# Patient Record
Sex: FEMALE | Race: WHITE | NOT HISPANIC OR LATINO | Employment: OTHER | URBAN - METROPOLITAN AREA
[De-identification: names, ages, dates, MRNs, and addresses within clinical notes are randomized per-mention and may not be internally consistent; named-entity substitution may affect disease eponyms.]

---

## 2017-04-19 ENCOUNTER — TELEPHONE (OUTPATIENT)
Dept: FAMILY MEDICINE | Facility: CLINIC | Age: 72
End: 2017-04-19

## 2017-04-19 NOTE — TELEPHONE ENCOUNTER
I called and according to MD does not need MMR if born before 1956.   I called patient and let her know this.      Vaccinating adults with MMR vaccine  For all [place] health care providers:   Unless there is a medical contraindication, give at least one dose of MMR to adults who were born in 1956 or later who do not have documentation of one or more doses of MMR. (Adults born before 1956 are generally considered immune to measles.)   Give a second dose of MMR at least 28 days later to:   Adults in an outbreak setting (In this outbreak, specific adults have already been contacted by the health department and directed to go to their provider.)   Students in post-secondary educational institutions   Persons who work in a health care facility   Persons who plan to travel internationally   Anyone who would like the additional protection of a second dose of MMR

## 2017-04-19 NOTE — TELEPHONE ENCOUNTER
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  What are your recommendations?  Should she be re-immunized?  Please call patient.  OK to leave message on voicemail.

## 2017-05-03 DIAGNOSIS — I10 ESSENTIAL HYPERTENSION WITH GOAL BLOOD PRESSURE LESS THAN 140/90: ICD-10-CM

## 2017-05-04 RX ORDER — ATENOLOL 25 MG/1
TABLET ORAL
Qty: 90 TABLET | Refills: 0 | Status: SHIPPED | OUTPATIENT
Start: 2017-05-04 | End: 2017-07-04

## 2017-05-04 NOTE — TELEPHONE ENCOUNTER
Last office visit : 6/6/16.    BP Readings from Last 3 Encounters:   06/06/16 110/78   03/01/16 141/89   02/23/16 104/62        Prescription approved per [facility] Refill Protocol.  Manju Mcfadden RN

## 2017-06-25 DIAGNOSIS — Z13.6 CARDIOVASCULAR SCREENING; LDL GOAL LESS THAN 160: ICD-10-CM

## 2017-06-26 RX ORDER — LOVASTATIN 20 MG
TABLET ORAL
Qty: 30 TABLET | Refills: 0 | Status: SHIPPED | OUTPATIENT
Start: 2017-06-26 | End: 2017-07-04

## 2017-06-26 NOTE — TELEPHONE ENCOUNTER
Lovastatin     Last Written Prescription Date: 9/9/16  Last Fill Quantity: 90, # refills: 2  Last Office Visit with G, P or [facility] prescribing provider: 6/6/16       Lab Results   Component Value Date    CHOL 192 06/06/2016     Lab Results   Component Value Date    HDL 68 06/06/2016     Lab Results   Component Value Date     06/06/2016     Lab Results   Component Value Date    TRIG 57 06/06/2016     Lab Results   Component Value Date    CHOLHDLRATIO 2.6 09/03/2015

## 2017-07-05 ENCOUNTER — OFFICE VISIT (OUTPATIENT)
Dept: FAMILY MEDICINE | Facility: CLINIC | Age: 72
End: 2017-07-05
Payer: COMMERCIAL

## 2017-07-05 VITALS
SYSTOLIC BLOOD PRESSURE: 112 MMHG | BODY MASS INDEX: 26.16 KG/M2 | TEMPERATURE: 96.5 F | HEIGHT: 65 IN | DIASTOLIC BLOOD PRESSURE: 70 MMHG | WEIGHT: 157 LBS | HEART RATE: 71 BPM | OXYGEN SATURATION: 98 %

## 2017-07-05 DIAGNOSIS — A60.1 HERPES SIMPLEX INFECTION OF PERIANAL SKIN: ICD-10-CM

## 2017-07-05 DIAGNOSIS — E78.5 HYPERLIPIDEMIA, UNSPECIFIED HYPERLIPIDEMIA TYPE: ICD-10-CM

## 2017-07-05 DIAGNOSIS — Z23 NEED FOR 23-POLYVALENT PNEUMOCOCCAL POLYSACCHARIDE VACCINE: ICD-10-CM

## 2017-07-05 DIAGNOSIS — Z85.828 HISTORY OF BASAL CELL CARCINOMA: ICD-10-CM

## 2017-07-05 DIAGNOSIS — L29.3 PERINEAL ITCHING, FEMALE: ICD-10-CM

## 2017-07-05 DIAGNOSIS — L98.9 SKIN LESION: ICD-10-CM

## 2017-07-05 DIAGNOSIS — Z12.31 ENCOUNTER FOR SCREENING MAMMOGRAM FOR BREAST CANCER: ICD-10-CM

## 2017-07-05 DIAGNOSIS — Z13.0 SCREENING FOR DEFICIENCY ANEMIA: ICD-10-CM

## 2017-07-05 DIAGNOSIS — Z00.00 ROUTINE GENERAL MEDICAL EXAMINATION AT A HEALTH CARE FACILITY: Primary | ICD-10-CM

## 2017-07-05 DIAGNOSIS — I10 HYPERTENSION GOAL BP (BLOOD PRESSURE) < 140/90: ICD-10-CM

## 2017-07-05 DIAGNOSIS — L84 FOOT CALLUS: ICD-10-CM

## 2017-07-05 LAB
ALBUMIN SERPL-MCNC: 3.7 G/DL (ref 3.4–5)
ALP SERPL-CCNC: 51 U/L (ref 40–150)
ALT SERPL W P-5'-P-CCNC: 24 U/L (ref 0–50)
ANION GAP SERPL CALCULATED.3IONS-SCNC: 10 MMOL/L (ref 3–14)
AST SERPL W P-5'-P-CCNC: 21 U/L (ref 0–45)
BASOPHILS # BLD AUTO: 0 10E9/L (ref 0–0.2)
BASOPHILS NFR BLD AUTO: 0.8 %
BILIRUB SERPL-MCNC: 0.3 MG/DL (ref 0.2–1.3)
BUN SERPL-MCNC: 20 MG/DL (ref 7–30)
CALCIUM SERPL-MCNC: 9.2 MG/DL (ref 8.5–10.1)
CHLORIDE SERPL-SCNC: 111 MMOL/L (ref 94–109)
CHOLEST SERPL-MCNC: 188 MG/DL
CO2 SERPL-SCNC: 23 MMOL/L (ref 20–32)
CREAT SERPL-MCNC: 0.8 MG/DL (ref 0.52–1.04)
CREAT UR-MCNC: 366 MG/DL
DIFFERENTIAL METHOD BLD: ABNORMAL
EOSINOPHIL # BLD AUTO: 0.1 10E9/L (ref 0–0.7)
EOSINOPHIL NFR BLD AUTO: 2.8 %
ERYTHROCYTE [DISTWIDTH] IN BLOOD BY AUTOMATED COUNT: 13.4 % (ref 10–15)
GFR SERPL CREATININE-BSD FRML MDRD: 70 ML/MIN/1.7M2
GLUCOSE SERPL-MCNC: 97 MG/DL (ref 70–99)
HCT VFR BLD AUTO: 41.1 % (ref 35–47)
HDLC SERPL-MCNC: 66 MG/DL
HGB BLD-MCNC: 13.7 G/DL (ref 11.7–15.7)
LDLC SERPL CALC-MCNC: 109 MG/DL
LYMPHOCYTES # BLD AUTO: 1 10E9/L (ref 0.8–5.3)
LYMPHOCYTES NFR BLD AUTO: 26.1 %
MCH RBC QN AUTO: 30.4 PG (ref 26.5–33)
MCHC RBC AUTO-ENTMCNC: 33.3 G/DL (ref 31.5–36.5)
MCV RBC AUTO: 91 FL (ref 78–100)
MICROALBUMIN UR-MCNC: 65 MG/L
MICROALBUMIN/CREAT UR: 17.79 MG/G CR (ref 0–25)
MONOCYTES # BLD AUTO: 0.5 10E9/L (ref 0–1.3)
MONOCYTES NFR BLD AUTO: 11.9 %
NEUTROPHILS # BLD AUTO: 2.3 10E9/L (ref 1.6–8.3)
NEUTROPHILS NFR BLD AUTO: 58.4 %
NONHDLC SERPL-MCNC: 122 MG/DL
PLATELET # BLD AUTO: 236 10E9/L (ref 150–450)
POTASSIUM SERPL-SCNC: 4 MMOL/L (ref 3.4–5.3)
PROT SERPL-MCNC: 7 G/DL (ref 6.8–8.8)
RBC # BLD AUTO: 4.5 10E12/L (ref 3.8–5.2)
SODIUM SERPL-SCNC: 144 MMOL/L (ref 133–144)
TRIGL SERPL-MCNC: 63 MG/DL
TSH SERPL DL<=0.005 MIU/L-ACNC: 2.4 MU/L (ref 0.4–4)
WBC # BLD AUTO: 3.9 10E9/L (ref 4–11)

## 2017-07-05 PROCEDURE — 90732 PPSV23 VACC 2 YRS+ SUBQ/IM: CPT | Performed by: FAMILY MEDICINE

## 2017-07-05 PROCEDURE — 80050 GENERAL HEALTH PANEL: CPT | Performed by: FAMILY MEDICINE

## 2017-07-05 PROCEDURE — 80061 LIPID PANEL: CPT | Performed by: FAMILY MEDICINE

## 2017-07-05 PROCEDURE — G0009 ADMIN PNEUMOCOCCAL VACCINE: HCPCS | Performed by: FAMILY MEDICINE

## 2017-07-05 PROCEDURE — 36415 COLL VENOUS BLD VENIPUNCTURE: CPT | Performed by: FAMILY MEDICINE

## 2017-07-05 PROCEDURE — 99213 OFFICE O/P EST LOW 20 MIN: CPT | Mod: 25 | Performed by: FAMILY MEDICINE

## 2017-07-05 PROCEDURE — 82043 UR ALBUMIN QUANTITATIVE: CPT | Performed by: FAMILY MEDICINE

## 2017-07-05 PROCEDURE — 99397 PER PM REEVAL EST PAT 65+ YR: CPT | Mod: 25 | Performed by: FAMILY MEDICINE

## 2017-07-05 RX ORDER — ATENOLOL 25 MG/1
TABLET ORAL
Qty: 90 TABLET | Refills: 1 | Status: SHIPPED | OUTPATIENT
Start: 2017-07-05 | End: 2018-08-03

## 2017-07-05 RX ORDER — LOVASTATIN 20 MG
20 TABLET ORAL AT BEDTIME
Qty: 90 TABLET | Refills: 3 | Status: SHIPPED | OUTPATIENT
Start: 2017-07-05 | End: 2018-08-03

## 2017-07-05 RX ORDER — ACYCLOVIR 400 MG/1
400 TABLET ORAL 3 TIMES DAILY
Qty: 30 TABLET | Refills: 0 | Status: SHIPPED | OUTPATIENT
Start: 2017-07-05 | End: 2018-06-07

## 2017-07-05 NOTE — PROGRESS NOTES
SUBJECTIVE:   Lidia Jenkins is a 71 year old female who presents for Preventive Visit.      Are you in the first 12 months of your Medicare coverage?  No    Physical   Annual:     Getting at least 3 servings of Calcium per day::  Yes    Bi-annual eye exam::  Yes    Dental care twice a year::  Yes    Sleep apnea or symptoms of sleep apnea::  None    Diet::  Regular (no restrictions)    Frequency of exercise::  4-5 days/week    Duration of exercise::  30-45 minutes    Taking medications regularly::  Yes    Medication side effects::  None    Additional concerns today::  YES    Answers for HPI/ROS submitted by the patient on 7/4/2017   PHQ-2 Score: 0    COGNITIVE SCREEN  1) Repeat 3 items (Banana, Sunrise, Chair)    2) Clock draw: NORMAL  3) 3 item recall: Recalls 3 objects  Results: NORMAL clock, 1-2 items recalled: COGNITIVE IMPAIRMENT LESS LIKELY    Mini-CogTM Copyright S Tj. Licensed by the author for use in [place] [employer]; reprinted with permission (maximiliano@[facility]). All rights reserved.        2yrs had an itchy area around anus , wiping felt good, may have broke skin, seen 6 months later and told to take clotrimaxole, then spread beyond anus, then vaginal area , Went doctor again, didn't know what it was, used desitin, didn't help   Sometimes at night uses bidet and helps     In addition to that now feels has herpes genital. No itching internally , only on inside. Wit pain and bumps right gluteal area near anus started this past week     Red area 2 yrs right knee     Dizzy when weeding in garden and stands up   If sitting n chair and reading and gets up feels blood pounding back of neck. Goes away quickly , never before occurs now after when just resting. Not all the time. Not in several weeks. No associated symptoms syncope, chest pain, blacking out. No palpitations, vision changes, URI symptoms, tingling numbness or falls     Wonders if satin will cause her right leg to suddenly catch and feel might  give way in right groin area. No falls, no bruising, has had xray's and MRI before and seen chiropractor and gets massage but nothing found    Wonders if vit d ok, was 55 in 2015  Would like lesion checked left forearm, wants to know if red bump noted past 4 days is a mosquito bite versus lyme's    HTN stable on atenolol    HLD on statin     Reviewed and updated as needed this visit by clinical staff  Tobacco  Allergies  Meds  Med Hx  Surg Hx  Fam Hx  Soc Hx        Reviewed and updated as needed this visit by Provider        Social History   Substance Use Topics     Smoking status: Never Smoker     Smokeless tobacco: Not on file     Alcohol use 2.4 oz/week       The patient does not drink >3 drinks per day nor >7 drinks per week.        PROBLEMS TO ADD ON... Pt would like a prescription for acyclovir     Today's PHQ-2 Score:   PHQ-2 ( 1999 Pfizer) 7/4/2017   Q1: Little interest or pleasure in doing things Not at all   Q2: Feeling down, depressed or hopeless Not at all   PHQ-2 Score 0       Do you feel safe in your environment - Yes    Do you have a Health Care Directive?: Yes: Patient states has Advance Directive and will bring in a copy to clinic.    Current providers sharing in care for this patient include:   Patient Care Team:  Jessica Cheema PA-C as PCP - General (Physician Assistant)  Ana Pantoja MD as MD (Family Medicine - Sports Medicine)      Hearing impairment: No    Ability to successfully perform activities of daily living: Yes, no assistance needed     Fall risk:  Fallen 2 or more times in the past year?: No  Any fall with injury in the past year?: No    Home safety:  none identified      The following health maintenance items are reviewed in Epic and correct as of today:  Health Maintenance   Topic Date Due     PNEUMOCOCCAL (2 of 2 - PPSV23) 06/27/2017     FALL RISK ASSESSMENT  06/06/2017     INFLUENZA VACCINE (SYSTEM ASSIGNED)  09/01/2017     MAMMO SCREEN Q2 YR (SYSTEM  ASSIGNED)  10/21/2017     LIPID SCREEN Q5 YR FEMALE (SYSTEM ASSIGNED)  06/06/2021     ADVANCE DIRECTIVE PLANNING Q5 YRS  06/27/2021     COLON CANCER SCREEN (SYSTEM ASSIGNED)  06/15/2022     TETANUS IMMUNIZATION (SYSTEM ASSIGNED)  01/01/2023     DEXA SCAN SCREENING (SYSTEM ASSIGNED)  Completed     HEPATITIS C SCREENING  Completed     Labs reviewed in EPIC  BP Readings from Last 3 Encounters:   07/05/17 112/70   06/06/16 110/78   03/01/16 141/89    Wt Readings from Last 3 Encounters:   07/05/17 157 lb (71.2 kg)   06/06/16 167 lb (75.8 kg)   03/01/16 172 lb 8 oz (78.2 kg)                  Patient Active Problem List   Diagnosis     Hypertension goal BP (blood pressure) < 140/90     History of basal cell carcinoma     Advance care planning     Past Surgical History:   Procedure Laterality Date     ABDOMEN SURGERY  1987     COLONOSCOPY  2012       Social History   Substance Use Topics     Smoking status: Never Smoker     Smokeless tobacco: Not on file     Alcohol use 2.4 oz/week     Family History   Problem Relation Age of Onset     Breast Cancer Mother      Hypertension Mother      Hyperlipidemia Mother      Depression Mother      Hypertension Brother      Hyperlipidemia Brother      Hypertension Sister      Hyperlipidemia Sister      Coronary Artery Disease Father      MI     Hypertension Father      Hyperlipidemia Father      Substance Abuse Father      Coronary Artery Disease Brother      Hypertension Brother      Substance Abuse Brother      Hypertension Sister      Hyperlipidemia Sister      Depression Sister      Substance Abuse Sister      Other Cancer No family hx of          Current Outpatient Prescriptions   Medication Sig Dispense Refill     atenolol (TENORMIN) 25 MG tablet TAKE 1 TABLET(25 MG) BY MOUTH DAILY 90 tablet 1     lovastatin (MEVACOR) 20 MG tablet Take 1 tablet (20 mg) by mouth At Bedtime 90 tablet 3     aspirin 81 MG EC tablet Take 1 tablet (81 mg) by mouth daily 90 tablet 3     cholecalciferol  "(VITAMIN D) 1000 UNIT tablet Take 2 tablets (2,000 Units) by mouth daily 100 tablet 3     No Known Allergies  Recent Labs   Lab Test  06/06/16   0951  09/03/15   1001 01/02/15 12/29/14 05/12/14   LDL  113*  97   --   117  93   HDL  68  69   --   60  75   TRIG  57  75   --   118  56   ALT  27  31  28   --    --    CR  0.76  0.80  0.76   --   0.72   GFRESTIMATED  75  71   --    --   87   GFRESTBLACK  >90   GFR Calc    86   --    --   100   POTASSIUM  3.9  4.2  4.3   --   4.4   TSH   --    --    --   1.71  2.710        Pneumonia Vaccine:Adults age 65+ who received Pneumovax (PPSV23) at 65 years or older: Should be given PCV13 > 1 year after their most recent PPSV23    ROS:  Constitutional, HEENT, cardiovascular, pulmonary, GI, , musculoskeletal, neuro, skin, endocrine and psych systems are negative, except as otherwise noted.    OBJECTIVE:   /70 (BP Location: Left arm, Patient Position: Chair, Cuff Size: Adult Regular)  Pulse 71  Temp 96.5  F (35.8  C) (Tympanic)  Ht 5' 5\" (1.651 m)  Wt 157 lb (71.2 kg)  SpO2 98%  BMI 26.13 kg/m2 Estimated body mass index is 26.13 kg/(m^2) as calculated from the following:    Height as of this encounter: 5' 5\" (1.651 m).    Weight as of this encounter: 157 lb (71.2 kg).  EXAM:   GENERAL: healthy, alert and no distress  EYES: Eyes grossly normal to inspection, PERRL and conjunctivae and sclerae normal  HENT: ear canals and TM's normal, nose and mouth without ulcers or lesions  NECK: no adenopathy, no asymmetry, masses, or scars and thyroid normal to palpation  RESP: lungs clear to auscultation - no rales, rhonchi or wheezes  BREAST: normal without masses, tenderness or nipple discharge and no palpable axillary masses or adenopathy  CV: regular rate and rhythm, normal S1 S2, no S3 or S4, no murmur, click or rub, no peripheral edema and peripheral pulses strong  ABDOMEN: soft, non tender, no hepatosplenomegaly, no masses and bowel sounds normal   (female): " normal female external genitalia, normal urethral meatus , vaginal mucosal atrophy and right gluteal collection of sores consistent with genital herpes, rectum normal, some skin tags, skin tear perineal body area,   MS: no gross musculoskeletal defects noted, no edema, full range of motion  hip , gait normal   SKIN: 1/cm rough red non blanching spot top of right knee and 1 cm raised red blanching bump left forearm near elbow consistent with local reaction to bug bite, no suspicious lesions or rashes left 5th toe side 1/4 cm deep hard lump? Wart vs callus   NEURO: Normal strength and tone, mentation intact and speech normal  PSYCH: mentation appears normal, affect normal/bright  LYMPH: no cervical, supraclavicular, axillary, or inguinal adenopathy    ASSESSMENT / PLAN:   1. Routine general medical examination at a health care facility  hx of HTN on atenolol & asa, HLD on lovastatin, hx of BCC, psoriasis, hx of abdominal surgery, prior colonoscopy in 2012 , seen by PCP OTNJA Cheema 6/2016 for preventive physical & HCTZ changed to atenolol, seen by derm, health care directive done 2016 MNPMP negative. Here for physical. Pneuomvax 23 today. Mammogram due in the fall. Labs today to evaluate symptoms. Refilled med's. Statin unlikely to cause isolated right hip issue. Can send to PT or sports med if gets worse. Declined for now. Acyclovir three times a day for 10 days for right gluteal genital herpes. follow up with derm regarding right knee lesion. Redness on left forearm only a bug bit , benadryl cream OTC. No rash seen perineum , suspect dry skin / lack of estrogen, may discuss with derm too. Offered clobetasol or trial of premarin declined. Does bike, could be humidity. Keep dry, use wet wipes , bidet and may try hydrocortisone OTC. Vit d normal in 2015. continue care with Deni. Avoid bending and get up slowly, to prevent dizziness, suspect combo of age and atenolol. No red flag sign seen, if gets worse or more  frequent call us may need to see rehab/ imaging/ neuro at that time.   - atenolol (TENORMIN) 25 MG tablet; TAKE 1 TABLET(25 MG) BY MOUTH DAILY  Dispense: 90 tablet; Refill: 1  - lovastatin (MEVACOR) 20 MG tablet; Take 1 tablet (20 mg) by mouth At Bedtime  Dispense: 90 tablet; Refill: 3  - CBC with platelets differential  - Comprehensive metabolic panel  - Lipid panel reflex to direct LDL  - TSH with free T4 reflex  - Albumin Random Urine Quantitative  - MA Screening Digital Bilateral; Future  - VACCINE ADMINISTRATION, INITIAL  - Pneumococcal vaccine 23 valent PPSV23  (Pneumovax) [[id number]]    2. Need for 23-polyvalent pneumococcal polysaccharide vaccine  - VACCINE ADMINISTRATION, INITIAL  - Pneumococcal vaccine 23 valent PPSV23  (Pneumovax) [[id number]]    3. Encounter for screening mammogram for breast cancer  - MA Screening Digital Bilateral; Future    4. Hypertension goal BP (blood pressure) < 140/90  Stable on med  - atenolol (TENORMIN) 25 MG tablet; TAKE 1 TABLET(25 MG) BY MOUTH DAILY  Dispense: 90 tablet; Refill: 1  - Comprehensive metabolic panel  - TSH with free T4 reflex  - Albumin Random Urine Quantitative    5. Hyperlipidemia, unspecified hyperlipidemia type  - lovastatin (MEVACOR) 20 MG tablet; Take 1 tablet (20 mg) by mouth At Bedtime  Dispense: 90 tablet; Refill: 3  - Lipid panel reflex to direct LDL  - TSH with free T4 reflex    6. History of basal cell carcinoma  Follow up with derm     7. Screening for deficiency anemia  - CBC with platelets differential    8. Herpes simplex infection of perianal skin  Right gluteal near anus  - acyclovir (ZOVIRAX) 400 MG tablet; Take 1 tablet (400 mg) by mouth 3 times daily  Dispense: 30 tablet; Refill: 0    9. Perineal itching, female  No rash seen perineum , suspect dry skin / lack of estrogen, may discuss with derm too. Offered clobetasol or trial of premarin declined. Does bike, could be humidity. Keep dry, use wet wipes , bidet and may try hydrocortisone OTC.  -  "OFFICE/OUTPT VISIT,EST,LEVL III  - DERMATOLOGY REFERRAL    10. Skin lesion  Right knee rough spot , could be precancerous, see derm for excision  - OFFICE/OUTPT VISIT,EST,LEVL III  - DERMATOLOGY REFERRAL    11. Foot callus  Left 5th lateral toe, suspect callus versus wart, feels deep, no cryo refer to podiatry   - OFFICE/OUTPT VISIT,EST,LEVL III  - PODIATRY/FOOT & ANKLE SURGERY REFERRAL        End of Life Planning:  Patient currently has an advanced directive: Yes.  Practitioner is supportive of decision.    COUNSELING:  Reviewed preventive health counseling, as reflected in patient instructions       Regular exercise       Healthy diet/nutrition       Vision screening       Dental care       Immunizations    Vaccinated for: Pneumococcal        Estimated body mass index is 26.13 kg/(m^2) as calculated from the following:    Height as of this encounter: 5' 5\" (1.651 m).    Weight as of this encounter: 157 lb (71.2 kg).  Weight management plan: Discussed healthy diet and exercise guidelines and patient will follow up in 12 months in clinic to re-evaluate.   reports that she has never smoked. She does not have any smokeless tobacco history on file.      Appropriate preventive services were discussed with this patient, including applicable screening as appropriate for cardiovascular disease, diabetes, osteopenia/osteoporosis, and glaucoma.  As appropriate for age/gender, discussed screening for colorectal cancer, prostate cancer, breast cancer, and cervical cancer. Checklist reviewing preventive services available has been given to the patient.    Reviewed patients plan of care and provided an AVS. The Basic Care Plan (routine screening as documented in Health Maintenance) for Lidia meets the Care Plan requirement. This Care Plan has been established and reviewed with the Patient.    Counseling Resources:  ATP IV Guidelines  Pooled Cohorts Equation Calculator  Breast Cancer Risk Calculator  FRAX Risk Assessment  ICSI " Preventive Guidelines  Dietary Guidelines for Americans, 2010  USDA's My Plate  ASA Prophylaxis  Lung CA Screening    Miguelina Harp MD  [facility]

## 2017-07-05 NOTE — NURSING NOTE
Screening Questionnaire for Adult Immunization    Are you sick today?   No   Do you have allergies to medications, food, a vaccine component or latex?   No   Have you ever had a serious reaction after receiving a vaccination?   No   Do you have a long-term health problem with heart disease, lung disease, asthma, kidney disease, metabolic disease (e.g. diabetes), anemia, or other blood disorder?   No   Do you have cancer, leukemia, HIV/AIDS, or any other immune system problem?   No   In the past 3 months, have you taken medications that affect  your immune system, such as prednisone, other steroids, or anticancer drugs; drugs for the treatment of rheumatoid arthritis, Crohn s disease, or psoriasis; or have you had radiation treatments?   No   Have you had a seizure, or a brain or other nervous system problem?   No   During the past year, have you received a transfusion of blood or blood     products, or been given immune (gamma) globulin or antiviral drug?   No   For women: Are you pregnant or is there a chance you could become        pregnant during the next month?   No   Have you received any vaccinations in the past 4 weeks?   No     Immunization questionnaire answers were all negative.      MNVFC doesn't apply on this patient    Per orders of Dr. Harp, injection of PCV 23 given by Toribio Dillon. Patient instructed to remain in clinic for 15 minutes afterwards, and to report any adverse reaction to me immediately.       Screening performed by Toribio Dillon on 7/5/2017 at 8:58 AM.

## 2017-07-05 NOTE — MR AVS SNAPSHOT
After Visit Summary   7/5/2017    Lidia Jenkins    MRN: [id number]           Patient Information     Date Of Birth          1945        Visit Information        Provider Department      7/5/2017 8:00 AM Miguelina Harp MD [facility]        Today's Diagnoses     Routine general medical examination at a health care facility    -  1    Need for 23-polyvalent pneumococcal polysaccharide vaccine        Encounter for screening mammogram for breast cancer        Hypertension goal BP (blood pressure) < 140/90        Hyperlipidemia, unspecified hyperlipidemia type        History of basal cell carcinoma        Screening for deficiency anemia        Herpes simplex infection of perianal skin        Perineal itching, female        Skin lesion        Foot callus          Care Instructions    pneuomvax 23 today   mammogram due in the fall  Labs today to evaluate symptoms  Refilled meds  Statin unlikely to cause isolated right hip issue  Can send to PT or sports med if gets worse  Acyclovir three times a day for 10 days for right gluteal genital herpes  follow up with derm regarding right knee lesion  Redness on left forearm only a bug bit , benadryl cream otc   No rash seen perineaum , suspect dry skin / lack of estrogen, may discuss with derm too   Vit d normal in 2015   continue care with Plosser  Avoid bending and get up slowly, if gets worse or more frequent call us           Follow-ups after your visit        Additional Services     DERMATOLOGY REFERRAL       Your provider has referred you to: [facility]: [facility] [phone]   [email or web address]  [facility]: [facility] [place] [phone]   [email or web address]    Please be aware that coverage of these services is subject to the terms and limitations of your health insurance plan.  Call member services at your health plan with  any benefit or coverage questions.      Please bring the following with you to your appointment:    (1) Any X-Rays, CTs or MRIs which have been performed.  Contact the facility where they were done to arrange for  prior to your scheduled appointment.  Any new CT, MRI or other procedures ordered by your specialist must be performed at a [place] facility or coordinated by your clinic's referral office.  (2) List of current medications  (3) This referral request   (4) Any documents/labs given to you for this referral            PODIATRY/FOOT & ANKLE SURGERY REFERRAL       Your provider has referred you to: [facility]: [facility] [phone]   [email or web address]    Please be aware that coverage of these services is subject to the terms and limitations of your health insurance plan.  Call member services at your health plan with any benefit or coverage questions.      Please bring the following to your appointment:  >>   Any x-rays, CTs or MRIs which have been performed.  Contact the facility where they were done to arrange for  prior to your scheduled appointment.    >>   List of current medications   >>   This referral request   >>   Any documents/labs given to you for this referral                  Future tests that were ordered for you today     Open Future Orders        Priority Expected Expires Ordered    MA Screening Digital Bilateral Routine  7/5/2018 7/5/2017            Who to contact     If you have questions or need follow up information about today's clinic visit or your schedule please contact [facility] directly at [phone].  Normal or non-critical lab and imaging results will be communicated to you by MyChart, letter or phone within 4 business days after the clinic has received the results. If you do not hear from us within 7 days, please contact the clinic through MyChart or phone. If you have a critical or abnormal lab  "result, we will notify you by phone as soon as possible.  Submit refill requests through [facility] or call your pharmacy and they will forward the refill request to us. Please allow 3 business days for your refill to be completed.          Additional Information About Your Visit        [facility] Information     [employer] gives you secure access to your electronic health record. If you see a primary care provider, you can also send messages to your care team and make appointments. If you have questions, please call your primary care clinic.  If you do not have a primary care provider, please call [phone] and they will assist you.        Care EveryWhere ID     This is your Care EveryWhere ID. This could be used by other organizations to access your [facility] medical records  [id number]        Your Vitals Were     Pulse Temperature Height Pulse Oximetry BMI (Body Mass Index)       71 96.5  F (35.8  C) (Tympanic) 5' 5\" (1.651 m) 98% 26.13 kg/m2        Blood Pressure from Last 3 Encounters:   07/05/17 112/70   06/06/16 110/78   03/01/16 141/89    Weight from Last 3 Encounters:   07/05/17 157 lb (71.2 kg)   06/06/16 167 lb (75.8 kg)   03/01/16 172 lb 8 oz (78.2 kg)              We Performed the Following     Albumin Random Urine Quantitative     CBC with platelets differential     Comprehensive metabolic panel     DERMATOLOGY REFERRAL     Lipid panel reflex to direct LDL     OFFICE/OUTPT VISIT,EST,LEVL III     Pneumococcal vaccine 23 valent PPSV23  (Pneumovax) [[id number]]     PODIATRY/FOOT & ANKLE SURGERY REFERRAL     TSH with free T4 reflex     VACCINE ADMINISTRATION, INITIAL          Today's Medication Changes          These changes are accurate as of: 7/5/17  8:36 AM.  If you have any questions, ask your nurse or doctor.               Start taking these medicines.        Dose/Directions    acyclovir 400 MG tablet   Commonly known as:  ZOVIRAX   Used for:  Herpes simplex infection of perianal skin   Started by:  Loyd " MD Miguelina        Dose:  400 mg   Take 1 tablet (400 mg) by mouth 3 times daily   Quantity:  30 tablet   Refills:  0         These medicines have changed or have updated prescriptions.        Dose/Directions    atenolol 25 MG tablet   Commonly known as:  TENORMIN   This may have changed:  See the new instructions.   Used for:  Hypertension goal BP (blood pressure) < 140/90, Routine general medical examination at a health care facility   Changed by:  Miguelina Harp MD        TAKE 1 TABLET(25 MG) BY MOUTH DAILY   Quantity:  90 tablet   Refills:  1       lovastatin 20 MG tablet   Commonly known as:  MEVACOR   This may have changed:  See the new instructions.   Used for:  Hyperlipidemia, unspecified hyperlipidemia type, Routine general medical examination at a health care facility   Changed by:  Miguelina Harp MD        Dose:  20 mg   Take 1 tablet (20 mg) by mouth At Bedtime   Quantity:  90 tablet   Refills:  3            Where to get your medicines      These medications were sent to [facility] [address] [place] [address] AT [address] [address]     Phone:  [phone]     acyclovir 400 MG tablet    atenolol 25 MG tablet    lovastatin 20 MG tablet                Primary Care Provider Office Phone # Fax #    Jessica Cheema PA-C [phone]       [facility]       [address]            [facility] [address]        Equal Access to Services     LUKE LORENZ AH: Hadii aad ku hadasho Soomaali, waaxda luqadaha, qaybta kaalmada adeegyada, fanta marquez. So [facility] [phone].    ATENCIÓN: Si habla español, tiene a rivas disposición servicios gratuitos de asistencia lingüística. Julio al [phone].    We comply with applicable federal civil rights laws and [place] laws. We do not discriminate on the basis of race, color, national origin, age, disability sex, sexual orientation or gender  identity.            Thank you!     Thank you for choosing [facility]  for your care. Our goal is always to provide you with excellent care. Hearing back from our patients is one way we can continue to improve our services. Please take a few minutes to complete the written survey that you may receive in the mail after your visit with us. Thank you!             Your Updated Medication List - Protect others around you: Learn how to safely use, store and throw away your medicines at www.disposemymeds.org.          This list is accurate as of: 7/5/17  8:36 AM.  Always use your most recent med list.                   Brand Name Dispense Instructions for use Diagnosis    acyclovir 400 MG tablet    ZOVIRAX    30 tablet    Take 1 tablet (400 mg) by mouth 3 times daily    Herpes simplex infection of perianal skin       aspirin 81 MG EC tablet     90 tablet    Take 1 tablet (81 mg) by mouth daily        atenolol 25 MG tablet    TENORMIN    90 tablet    TAKE 1 TABLET(25 MG) BY MOUTH DAILY    Hypertension goal BP (blood pressure) < 140/90, Routine general medical examination at a health care facility       cholecalciferol 1000 UNIT tablet    vitamin D    100 tablet    Take 2 tablets (2,000 Units) by mouth daily    Encounter for vitamin deficiency screening       lovastatin 20 MG tablet    MEVACOR    90 tablet    Take 1 tablet (20 mg) by mouth At Bedtime    Hyperlipidemia, unspecified hyperlipidemia type, Routine general medical examination at a health care facility

## 2017-07-05 NOTE — NURSING NOTE
"Chief Complaint   Patient presents with     Physical       Initial /70 (BP Location: Left arm, Patient Position: Chair, Cuff Size: Adult Regular)  Pulse 71  Temp 96.5  F (35.8  C) (Tympanic)  Ht 5' 5\" (1.651 m)  Wt 157 lb (71.2 kg)  SpO2 98%  BMI 26.13 kg/m2 Estimated body mass index is 26.13 kg/(m^2) as calculated from the following:    Height as of this encounter: 5' 5\" (1.651 m).    Weight as of this encounter: 157 lb (71.2 kg).  Medication Reconciliation: complete     Toribio Dillon MA    "

## 2017-07-05 NOTE — PROGRESS NOTES
Hello Ms. Jenkins,  Your results came back and are within acceptable limits. -Liver and gallbladder tests are normal. (ALT,AST, Alk phos, bilirubin), kidney function is normal (Cr, GFR), Sodium is normal, Potassium is normal, Calcium is normal, Glucose is normal (diabetes screening test).   -Cholesterol levels (LDL,HDL, Triglycerides) are normal.  ADVISE: rechecking in 1 year.  -TSH (thyroid stimulating hormone) level is normal which indicates normal thyroid function.. If you have any further concerns please do not hesitate to contact us by message, phone or making an appointment.  Have a good day   Dr Loyd PICKARD

## 2017-07-05 NOTE — PATIENT INSTRUCTIONS
pneuomvax 23 today   mammogram due in the fall  Labs today to evaluate symptoms  Refilled meds  Statin unlikely to cause isolated right hip issue  Can send to PT or sports med if gets worse  Acyclovir three times a day for 10 days for right gluteal genital herpes  follow up with derm regarding right knee lesion  Redness on left forearm only a bug bit , benadryl cream otc   No rash seen perineaum , suspect dry skin / lack of estrogen, may discuss with derm too   Vit d normal in 2015   continue care with Plosser  Avoid bending and get up slowly, if gets worse or more frequent call us

## 2017-07-05 NOTE — PROGRESS NOTES
Coco JamesCarmen Jenkins,  Your results came back and are within acceptable limits. -Microalbumin (urine protein) test is normal.  ADVISE: recheck annually. If you have any further concerns please do not hesitate to contact us by message, phone or making an appointment.  Have a good day   Dr Loyd PICKARD

## 2017-08-07 DIAGNOSIS — Z00.00 ROUTINE GENERAL MEDICAL EXAMINATION AT A HEALTH CARE FACILITY: ICD-10-CM

## 2017-08-07 DIAGNOSIS — I10 HYPERTENSION GOAL BP (BLOOD PRESSURE) < 140/90: ICD-10-CM

## 2017-08-07 RX ORDER — ATENOLOL 25 MG/1
TABLET ORAL
Qty: 90 TABLET | Refills: 1 | Status: CANCELLED | OUTPATIENT
Start: 2017-08-07

## 2017-08-07 NOTE — TELEPHONE ENCOUNTER
all strengths atenolol on back order until late Aug.  Do you want to change patient to a different beta blocker in the meantime? please advise  Medication Detail      Disp Refills Start End EMMANUEL   atenolol (TENORMIN) 25 MG tablet 90 tablet 1 7/5/2017  No   Sig: TAKE 1 TABLET(25 MG) BY MOUTH DAILY        Last Office Visit with G, P or [facility] prescribing provider:  7/5/17   Future Office Visit:    Next 5 appointments (look out 90 days)     Sep 22, 2017 10:00 AM CDT   Office Visit with Mary Linder MD   [facility] ([facility] )    [address] [place] [id number] [phone]                    BP Readings from Last 3 Encounters:   07/05/17 112/70   06/06/16 110/78   03/01/16 141/89

## 2017-08-10 NOTE — TELEPHONE ENCOUNTER
"Go ahead with refill but advise patient if pharmacy does not have any she can shop around other pharmacies or follow up in office to discuss options  Thanks  Jessica \"Taz\" JUAN ANTONIO Cheema   "

## 2017-08-11 ENCOUNTER — OFFICE VISIT (OUTPATIENT)
Dept: FAMILY MEDICINE | Facility: CLINIC | Age: 72
End: 2017-08-11
Payer: COMMERCIAL

## 2017-08-11 VITALS
BODY MASS INDEX: 26.29 KG/M2 | HEART RATE: 64 BPM | TEMPERATURE: 97.9 F | RESPIRATION RATE: 16 BRPM | DIASTOLIC BLOOD PRESSURE: 78 MMHG | OXYGEN SATURATION: 99 % | SYSTOLIC BLOOD PRESSURE: 120 MMHG | WEIGHT: 158 LBS

## 2017-08-11 DIAGNOSIS — R07.0 THROAT PAIN: ICD-10-CM

## 2017-08-11 DIAGNOSIS — J02.9 ACUTE PHARYNGITIS, UNSPECIFIED ETIOLOGY: ICD-10-CM

## 2017-08-11 DIAGNOSIS — R09.82 PND (POST-NASAL DRIP): Primary | ICD-10-CM

## 2017-08-11 LAB
DEPRECATED S PYO AG THROAT QL EIA: NORMAL
MICRO REPORT STATUS: NORMAL
SPECIMEN SOURCE: NORMAL

## 2017-08-11 PROCEDURE — 87880 STREP A ASSAY W/OPTIC: CPT | Performed by: PHYSICIAN ASSISTANT

## 2017-08-11 PROCEDURE — 99213 OFFICE O/P EST LOW 20 MIN: CPT | Performed by: PHYSICIAN ASSISTANT

## 2017-08-11 PROCEDURE — 87081 CULTURE SCREEN ONLY: CPT | Performed by: PHYSICIAN ASSISTANT

## 2017-08-11 RX ORDER — FLUTICASONE PROPIONATE 50 MCG
2 SPRAY, SUSPENSION (ML) NASAL DAILY
Qty: 16 G | Refills: 3 | Status: SHIPPED | OUTPATIENT
Start: 2017-08-11 | End: 2018-03-29

## 2017-08-11 NOTE — PROGRESS NOTES
SUBJECTIVE:                                                    Lidia Jenkins is a 71 year old female who presents to clinic today for the following health issues:      THROAT SYMPTOMS      Duration: x 2 weeks    Description  Cough, ear pain- left, sore throat - symptoms has been consistent for 4-5 days now    Severity: mild to moderate sore throat, moderate to severe cough but cough is better    Accompanying signs and symptoms: headache    History (predisposing factors):  none    Precipitating or alleviating factors: None    Therapies tried and outcome:  Cough medicine - helpful            Problem list and histories reviewed & adjusted, as indicated.  Additional history: as documented    Patient Active Problem List   Diagnosis     Hypertension goal BP (blood pressure) < 140/90     History of basal cell carcinoma     Advance care planning     Past Surgical History:   Procedure Laterality Date     ABDOMEN SURGERY  1987     COLONOSCOPY  2012       Social History   Substance Use Topics     Smoking status: Never Smoker     Smokeless tobacco: Not on file     Alcohol use 2.4 oz/week     Family History   Problem Relation Age of Onset     Breast Cancer Mother      Hypertension Mother      Hyperlipidemia Mother      Depression Mother      Hypertension Brother      Hyperlipidemia Brother      Hypertension Sister      Hyperlipidemia Sister      Coronary Artery Disease Father      MI     Hypertension Father      Hyperlipidemia Father      Substance Abuse Father      Coronary Artery Disease Brother      Hypertension Brother      Substance Abuse Brother      Hypertension Sister      Hyperlipidemia Sister      Depression Sister      Substance Abuse Sister      Other Cancer No family hx of          Current Outpatient Prescriptions   Medication Sig Dispense Refill     fluticasone (FLONASE) 50 MCG/ACT spray Spray 2 sprays into both nostrils daily 16 g 3     atenolol (TENORMIN) 25 MG tablet TAKE 1 TABLET(25 MG) BY MOUTH DAILY 90  tablet 1     lovastatin (MEVACOR) 20 MG tablet Take 1 tablet (20 mg) by mouth At Bedtime 90 tablet 3     acyclovir (ZOVIRAX) 400 MG tablet Take 1 tablet (400 mg) by mouth 3 times daily 30 tablet 0     aspirin 81 MG EC tablet Take 1 tablet (81 mg) by mouth daily 90 tablet 3     cholecalciferol (VITAMIN D) 1000 UNIT tablet Take 2 tablets (2,000 Units) by mouth daily 100 tablet 3     No Known Allergies      Reviewed and updated as needed this visit by clinical staff     Reviewed and updated as needed this visit by Provider         ROS:  Constitutional, HEENT, cardiovascular, pulmonary, gi and gu systems are negative, except as otherwise noted.      OBJECTIVE:   /78  Pulse 64  Temp 97.9  F (36.6  C) (Tympanic)  Resp 16  Wt 158 lb (71.7 kg)  SpO2 99%  BMI 26.29 kg/m2  Body mass index is 26.29 kg/(m^2).  GENERAL: healthy, alert and no distress  EYES: Eyes grossly normal to inspection, PERRL and conjunctivae and sclerae normal  HENT: ear canals and TM's normal, nose and mouth without ulcers or lesions  NECK: no adenopathy, no asymmetry, masses, or scars and thyroid normal to palpation  RESP: lungs clear to auscultation - no rales, rhonchi or wheezes  CV: regular rate and rhythm, normal S1 S2, no S3 or S4, no murmur, click or rub, no peripheral edema and peripheral pulses strong  PSYCH: mentation appears normal, affect normal/bright    Diagnostic Test Results:  Results for orders placed or performed in visit on 08/11/17   Strep, Rapid Screen   Result Value Ref Range    Specimen Description Throat     Rapid Strep A Screen       NEGATIVE: No Group A streptococcal antigen detected by immunoassay, await   culture report.      Micro Report Status FINAL 08/11/2017         ASSESSMENT/PLAN:       ICD-10-CM    1. PND (post-nasal drip) R09.82    2. Acute pharyngitis, unspecified etiology J02.9    3. Throat pain R07.0 Strep, Rapid Screen     Beta strep group A culture     fluticasone (FLONASE) 50 MCG/ACT spray        Patient Instructions   Encouraged warm salt water gargles, cepacol spray, soothers/lozenges, sinus rinses (neilmed), flonase (2 sprays per nostril daily x 2 weeks), vitamin c, fluids and rest.  May alternate tylenol and NSAIDS (ibuprofen, advil, aleve type products) every 4-6 hours for the next few days as needed.    No need for oral antibiotic at this time.  Return to clinic with any worsening or changes in symptoms.       Jessica Cheema PA-C  [facility]

## 2017-08-11 NOTE — MR AVS SNAPSHOT
After Visit Summary   8/11/2017    Lidia Jenkins    MRN: [id number]           Patient Information     Date Of Birth          1945        Visit Information        Provider Department      8/11/2017 3:20 PM Jessica Cheema PA-C [facility]        Today's Diagnoses     Throat pain    -  1      Care Instructions    Encouraged warm salt water gargles, cepacol spray, soothers/lozenges, sinus rinses (neilmed), flonase (2 sprays per nostril daily x 2 weeks), vitamin c, fluids and rest.  May alternate tylenol and NSAIDS (ibuprofen, advil, aleve type products) every 4-6 hours for the next few days as needed.    No need for oral antibiotic at this time.  Return to clinic with any worsening or changes in symptoms.           Follow-ups after your visit        Your next 10 appointments already scheduled     Aug 31, 2017  8:00 AM CDT   New Visit with Lit Blanco DPM   [facility] ([facility])    [address] [facility] [address] [phone]            Sep 14, 2017 10:30 AM CDT   MA SCREENING DIGITAL BILATERAL with [id number]   [facility] ([facility])    [address] [place] [address], [address]  [facility] [address] [phone]           Do not use any powder, lotion or deodorant under your arms or on your breast. If you do, we will ask you to remove it before your exam.  Wear comfortable, two-piece clothing.  If you have any allergies, tell your care team.  Bring any previous mammograms from other facilities or have them mailed to the breast center.            Sep 22, 2017 10:00 AM CDT   Office Visit with Mary Linder MD   [facility] ([facility] )    [address] [place] [address]  [address] [place] [address] [phone]           Bring a current list of meds and any records pertaining to this visit. For Physicals, please bring immunization records  and any forms needing to be filled out. Please arrive 10 minutes early to complete paperwork.              Who to contact     If you have questions or need follow up information about today's clinic visit or your schedule please contact [facility] directly at [phone].  Normal or non-critical lab and imaging results will be communicated to you by MyChart, letter or phone within 4 business days after the clinic has received the results. If you do not hear from us within 7 days, please contact the clinic through [employer] or phone. If you have a critical or abnormal lab result, we will notify you by phone as soon as possible.  Submit refill requests through [employer] or call your pharmacy and they will forward the refill request to us. Please allow 3 business days for your refill to be completed.          Additional Information About Your Visit        [employer] Information     [employer] gives you secure access to your electronic health record. If you see a primary care provider, you can also send messages to your care team and make appointments. If you have questions, please call your primary care clinic.  If you do not have a primary care provider, please call [phone] and they will assist you.        Care EveryWhere ID     This is your Care EveryWhere ID. This could be used by other organizations to access your [place] medical records  [id number]        Your Vitals Were     Pulse Temperature Respirations Pulse Oximetry BMI (Body Mass Index)       64 97.9  F (36.6  C) (Tympanic) 16 99% 26.29 kg/m2        Blood Pressure from Last 3 Encounters:   08/11/17 120/78   07/05/17 112/70   06/06/16 110/78    Weight from Last 3 Encounters:   08/11/17 158 lb (71.7 kg)   07/05/17 157 lb (71.2 kg)   06/06/16 167 lb (75.8 kg)              We Performed the Following     Beta strep group A culture     Strep, Rapid Screen          Today's Medication Changes          These changes are accurate as of: 8/11/17  3:59 PM.   If you have any questions, ask your nurse or doctor.               Start taking these medicines.        Dose/Directions    fluticasone 50 MCG/ACT spray   Commonly known as:  FLONASE   Used for:  Throat pain        Dose:  2 spray   Spray 2 sprays into both nostrils daily   Quantity:  16 g   Refills:  3            Where to get your medicines      These medications were sent to [facility] [place], [place] - [address]  [address], [facility] [address]     Phone:  [phone]     fluticasone 50 MCG/ACT spray                Primary Care Provider Office Phone # Fax #    Jessica Cheema PA-C [phone]       [address]  [facility] [address]        Equal Access to Services     LUKE LORENZ : Hadii rehana ceballoso Sosamina, waaxda luqadaha, qaybta kaalmada adeegyada, fanta marquez. So [facility] [phone].    ATENCIÓN: Si habla español, tiene a rivas disposición servicios gratuitos de asistencia lingüística. [facility] [phone].    We comply with applicable federal civil rights laws and [place] laws. We do not discriminate on the basis of race, color, national origin, age, disability sex, sexual orientation or gender identity.            Thank you!     Thank you for choosing [facility]  for your care. Our goal is always to provide you with excellent care. Hearing back from our patients is one way we can continue to improve our services. Please take a few minutes to complete the written survey that you may receive in the mail after your visit with us. Thank you!             Your Updated Medication List - Protect others around you: Learn how to safely use, store and throw away your medicines at www.disposemymeds.org.          This list is accurate as of: 8/11/17  3:59 PM.  Always use your most recent med list.                   Brand Name Dispense Instructions for use Diagnosis    acyclovir 400 MG tablet    ZOVIRAX    30  tablet    Take 1 tablet (400 mg) by mouth 3 times daily    Herpes simplex infection of perianal skin       aspirin 81 MG EC tablet     90 tablet    Take 1 tablet (81 mg) by mouth daily        atenolol 25 MG tablet    TENORMIN    90 tablet    TAKE 1 TABLET(25 MG) BY MOUTH DAILY    Hypertension goal BP (blood pressure) < 140/90, Routine general medical examination at a health care facility       cholecalciferol 1000 UNIT tablet    vitamin D    100 tablet    Take 2 tablets (2,000 Units) by mouth daily    Encounter for vitamin deficiency screening       fluticasone 50 MCG/ACT spray    FLONASE    16 g    Spray 2 sprays into both nostrils daily    Throat pain       lovastatin 20 MG tablet    MEVACOR    90 tablet    Take 1 tablet (20 mg) by mouth At Bedtime    Hyperlipidemia, unspecified hyperlipidemia type, Routine general medical examination at a health care facility

## 2017-08-11 NOTE — PATIENT INSTRUCTIONS
Encouraged warm salt water gargles, cepacol spray, soothers/lozenges, sinus rinses (neilmed), flonase (2 sprays per nostril daily x 2 weeks), vitamin c, fluids and rest.  May alternate tylenol and NSAIDS (ibuprofen, advil, aleve type products) every 4-6 hours for the next few days as needed.    No need for oral antibiotic at this time.  Return to clinic with any worsening or changes in symptoms.

## 2017-08-11 NOTE — NURSING NOTE
"Chief Complaint   Patient presents with     Throat Problem       Initial /78  Pulse 64  Temp 97.9  F (36.6  C) (Tympanic)  Resp 16  Wt 158 lb (71.7 kg)  SpO2 99%  BMI 26.29 kg/m2 Estimated body mass index is 26.29 kg/(m^2) as calculated from the following:    Height as of 7/5/17: 5' 5\" (1.651 m).    Weight as of this encounter: 158 lb (71.7 kg).  Medication Reconciliation: complete     Serina Dillon MA      "

## 2017-08-12 LAB
BACTERIA SPEC CULT: NORMAL
MICRO REPORT STATUS: NORMAL
SPECIMEN SOURCE: NORMAL

## 2017-08-31 ENCOUNTER — OFFICE VISIT (OUTPATIENT)
Dept: PODIATRY | Facility: CLINIC | Age: 72
End: 2017-08-31
Payer: COMMERCIAL

## 2017-08-31 VITALS — HEART RATE: 72 BPM | WEIGHT: 158 LBS | HEIGHT: 65 IN | BODY MASS INDEX: 26.33 KG/M2

## 2017-08-31 DIAGNOSIS — L84 CALLUS OF FOOT: Primary | ICD-10-CM

## 2017-08-31 DIAGNOSIS — M79.674 TOE PAIN, RIGHT: ICD-10-CM

## 2017-08-31 DIAGNOSIS — M79.672 LEFT FOOT PAIN: ICD-10-CM

## 2017-08-31 PROCEDURE — 99203 OFFICE O/P NEW LOW 30 MIN: CPT | Performed by: PODIATRIST

## 2017-08-31 NOTE — MR AVS SNAPSHOT
After Visit Summary   8/31/2017    Lidia Jenkins    MRN: [id number]           Patient Information     Date Of Birth          1945        Visit Information        Provider Department      8/31/2017 8:00 AM Lit Blanco DPM [facility]        Today's Diagnoses     Callus of foot, left foot    -  1    Left foot pain        Toe pain, right           Follow-ups after your visit        Your next 10 appointments already scheduled     Sep 22, 2017 10:00 AM CDT   Office Visit with Mary Linder MD   [facility] ([facility] )    [address] [place] [address]  [address]  [facility] [id number] [phone]           Bring a current list of meds and any records pertaining to this visit. For Physicals, please bring immunization records and any forms needing to be filled out. Please arrive 10 minutes early to complete paperwork.              Who to contact     If you have questions or need follow up information about today's clinic visit or your schedule please contact [facility] directly at [phone].  Normal or non-critical lab and imaging results will be communicated to you by [employer], letter or phone within 4 business days after the clinic has received the results. If you do not hear from us within 7 days, please contact the clinic through [employer] or phone. If you have a critical or abnormal lab result, we will notify you by phone as soon as possible.  Submit refill requests through [employer] or call your pharmacy and they will forward the refill request to us. Please allow 3 business days for your refill to be completed.          Additional Information About Your Visit        [employer] Information     [employer] gives you secure access to your electronic health record. If you see a primary care provider, you can also send messages to your care team and make appointments. If you have questions, please call your  "primary care clinic.  If you do not have a primary care provider, please call [phone] and they will assist you.        Care EveryWhere ID     This is your Care EveryWhere ID. This could be used by other organizations to access your [place] medical records  [id number]        Your Vitals Were     Height BMI (Body Mass Index)                5' 5\" (1.651 m) 26.29 kg/m2           Blood Pressure from Last 3 Encounters:   08/11/17 120/78   07/05/17 112/70   06/06/16 110/78    Weight from Last 3 Encounters:   08/31/17 158 lb (71.7 kg)   08/11/17 158 lb (71.7 kg)   07/05/17 157 lb (71.2 kg)              Today, you had the following     No orders found for display       Primary Care Provider Office Phone # Fax #    Jessica Cheema PA-C [phone]       [address]  [facility] [address]        Equal Access to Services     LUKE LORENZ : Hadii aad ku hadasho Soomaali, waaxda luqadaha, qaybta kaalmada adeegyada, waxay idiin hayaan adeeg khgunjan gregory . So [facility] [phone].    ATENCIÓN: Si habla español, tiene a rivas disposición servicios gratuitos de asistencia lingüística. [facility] [phone].    We comply with applicable federal civil rights laws and [place] laws. We do not discriminate on the basis of race, color, national origin, age, disability sex, sexual orientation or gender identity.            Thank you!     Thank you for choosing [facility]  for your care. Our goal is always to provide you with excellent care. Hearing back from our patients is one way we can continue to improve our services. Please take a few minutes to complete the written survey that you may receive in the mail after your visit with us. Thank you!             Your Updated Medication List - Protect others around you: Learn how to safely use, store and throw away your medicines at www.disposemymeds.org.          This list is accurate as of: 8/31/17  8:47 AM.  Always use your most recent " med list.                   Brand Name Dispense Instructions for use Diagnosis    acyclovir 400 MG tablet    ZOVIRAX    30 tablet    Take 1 tablet (400 mg) by mouth 3 times daily    Herpes simplex infection of perianal skin       aspirin 81 MG EC tablet     90 tablet    Take 1 tablet (81 mg) by mouth daily        atenolol 25 MG tablet    TENORMIN    90 tablet    TAKE 1 TABLET(25 MG) BY MOUTH DAILY    Hypertension goal BP (blood pressure) < 140/90, Routine general medical examination at a health care facility       cholecalciferol 1000 UNIT tablet    vitamin D    100 tablet    Take 2 tablets (2,000 Units) by mouth daily    Encounter for vitamin deficiency screening       fluticasone 50 MCG/ACT spray    FLONASE    16 g    Spray 2 sprays into both nostrils daily    Throat pain       lovastatin 20 MG tablet    MEVACOR    90 tablet    Take 1 tablet (20 mg) by mouth At Bedtime    Hyperlipidemia, unspecified hyperlipidemia type, Routine general medical examination at a health care facility

## 2017-08-31 NOTE — LETTER
2017         RE: Lidia Jenkins  [address] [place], [place] [id number]        Dear Colleague,    Thank you for referring your patient, Lidia Jenkins, to the [facility]. Please see a copy of my visit note below.      ASSESSMENT/PLAN:    Encounter Diagnoses   Name Primary?     Callus of foot, left foot Yes     Left foot pain      Toe pain, right      I was not able to definitively tell her if the small lesions are warts.  I doubt it. They are in a higher pressure area and consistent with porokeratosis.     I suggested that she file the area down periodically . Stiff soled shoes can help offload the forefoot during gait.  An over the counter plantar wart preparation can be tried.      She is to return if the lesions change, or increase in size.     Right hallux pain is intermittent and likely related to inflammation of the lateral skin fold .  A partial nail avulsion is an option, should the pain become more frequent, constant or infection develops.      Body mass index is 26.29 kg/(m^2).    Weight management plan: Patient was referred to their PCP to discuss a diet and exercise plan.      Lit Blanco DPM, FACFAS, MS    [facility] Department of Podiatry/Foot & Ankle Surgery      ____________________________________________________________________    HPI:         Chief Complaint: ? Wart on bottom of left foot  Onset of problem: 3 eyars  Pain/ discomfort is described as:  Slight pain when walking barefoot  Ratin/10   Frequency:  intermittent    The pain is made worse with barefoot walking  Previous treatment: no    Secondary concerns:  She asked if she has an ingrown toenail on the right hallux.  The lateral edge hurts on an intermittent basis.    *  Past Medical History:   Diagnosis Date     Arthritis      Basal cell carcinoma      Hyperlipidaemia LDL goal < 100      Hypertension goal BP (blood pressure) < 140/90      Psoriasis    *  *  Past Surgical History:   Procedure Laterality  Date     ABDOMEN SURGERY  1987     COLONOSCOPY  2012   *  *  Current Outpatient Prescriptions   Medication Sig Dispense Refill     fluticasone (FLONASE) 50 MCG/ACT spray Spray 2 sprays into both nostrils daily 16 g 3     atenolol (TENORMIN) 25 MG tablet TAKE 1 TABLET(25 MG) BY MOUTH DAILY 90 tablet 1     lovastatin (MEVACOR) 20 MG tablet Take 1 tablet (20 mg) by mouth At Bedtime 90 tablet 3     acyclovir (ZOVIRAX) 400 MG tablet Take 1 tablet (400 mg) by mouth 3 times daily 30 tablet 0     aspirin 81 MG EC tablet Take 1 tablet (81 mg) by mouth daily 90 tablet 3     cholecalciferol (VITAMIN D) 1000 UNIT tablet Take 2 tablets (2,000 Units) by mouth daily 100 tablet 3       ROS:     A 10-point review of systems was performed and is positive for that noted in the HPI and as seen below.  All other areas are negative.     Numbness in feet?  no   Calf pain with walking? no  Recent foot/ankle injury? no  Weight change over past 12 months? 20# loss  Self perception as overweight? yes  Recent flu-like symptoms? no  Joint pain other than feet ? Right thumb, occasional bilateral knee pain    Social History: Employment:  no;  Exercise/Physical activity:  Walking 4x/ week, biking, gardening, weights;  Tobacco use:  no  Social History     Social History     Marital status: Single     Spouse name: N/A     Number of children: N/A     Years of education: N/A     Occupational History     Not on file.     Social History Main Topics     Smoking status: Never Smoker     Smokeless tobacco: Not on file     Alcohol use 2.4 oz/week     Drug use: No     Sexual activity: No     Other Topics Concern     Parent/Sibling W/ Cabg, Mi Or Angioplasty Before 65f 55m? Yes     Social History Narrative       Family history:  Family History   Problem Relation Age of Onset     Breast Cancer Mother      Hypertension Mother      Hyperlipidemia Mother      Depression Mother      Hypertension Brother      Hyperlipidemia Brother      Hypertension Sister       "Hyperlipidemia Sister      Coronary Artery Disease Father      MI     Hypertension Father      Hyperlipidemia Father      Substance Abuse Father      Coronary Artery Disease Brother      Hypertension Brother      Substance Abuse Brother      Hypertension Sister      Hyperlipidemia Sister      Depression Sister      Substance Abuse Sister      Other Cancer No family hx of        Rheumatoid arthritis:  no  Foot Problems: no  Diabetes: no      EXAM:    Vitals: Ht 5' 5\" (1.651 m)  Wt 158 lb (71.7 kg)  BMI 26.29 kg/m2  BMI: Body mass index is 26.29 kg/(m^2).  Height: 5' 5\"    Constitutional/ general:  Pt is in no apparent distress, appears well-nourished.  Cooperative with history and physical exam.     Vascular:  Pedal pulses are palpable bilaterally for both the DP and PT arteries.  CFT < 3 sec.  No edema.  Pedal hair growth noted.     Neuro:  Alert and oriented x 3. Coordinated gait.  Light touch sensation is intact to the L4, L5, S1 distributions. No obvious deficits.  No evidence of neurological-based weakness, spasticity, or contracture in the lower extremities.     Derm: Normal texture and turgor.  No erythema, ecchymosis, or cyanosis.  No open lesions.  Several palpable, small hyperkeratotic lesions sub left 5th met head. Difficult to see, due to small size.  Post pairing, still uncertain if verrucoid or not.  No erythema, edema, tenderness along the lateral skin fold of the right hallux.     Musculoskeletal:    Lower extremity muscle strength is normal.  Patient is ambulatory without an assistive device or brace .  Decrease in medial longitudinal arch with weight bearing.  Hallux abducto valgus deformity, left foot.  Reducible in the transverse plane with preserved sagittal plane ROM.  No crepitus.  Right 2nd toe deviates medially and contacts the hallux.       Lit Blanco, ISAURO, FACFAS, MS    [place] Department of Podiatry/Foot & Ankle Surgery                Again, thank you for allowing me to participate in " the care of your patient.        Sincerely,        Lit Blanco, DEWEYM

## 2017-08-31 NOTE — NURSING NOTE
"Chief Complaint   Patient presents with     Plantar Wart     L foot x 3 years       Initial Ht 5' 5\" (1.651 m)  Wt 158 lb (71.7 kg)  BMI 26.29 kg/m2 Estimated body mass index is 26.29 kg/(m^2) as calculated from the following:    Height as of this encounter: 5' 5\" (1.651 m).    Weight as of this encounter: 158 lb (71.7 kg).  Medication Reconciliation: complete  "

## 2017-08-31 NOTE — PROGRESS NOTES
ASSESSMENT/PLAN:    Encounter Diagnoses   Name Primary?     Callus of foot, left foot Yes     Left foot pain      Toe pain, right      I was not able to definitively tell her if the small lesions are warts.  I doubt it. They are in a higher pressure area and consistent with porokeratosis.     I suggested that she file the area down periodically . Stiff soled shoes can help offload the forefoot during gait.  An over the counter plantar wart preparation can be tried.      She is to return if the lesions change, or increase in size.     Right hallux pain is intermittent and likely related to inflammation of the lateral skin fold .  A partial nail avulsion is an option, should the pain become more frequent, constant or infection develops.      Body mass index is 26.29 kg/(m^2).    Weight management plan: Patient was referred to their PCP to discuss a diet and exercise plan.      Lit Blanco DPM, FACFAS, MS    [place] [facility]      ____________________________________________________________________    HPI:         Chief Complaint: ? Wart on bottom of left foot  Onset of problem: 3 eyars  Pain/ discomfort is described as:  Slight pain when walking barefoot  Ratin/10   Frequency:  intermittent    The pain is made worse with barefoot walking  Previous treatment: no    Secondary concerns:  She asked if she has an ingrown toenail on the right hallux.  The lateral edge hurts on an intermittent basis.    *  Past Medical History:   Diagnosis Date     Arthritis      Basal cell carcinoma      Hyperlipidaemia LDL goal < 100      Hypertension goal BP (blood pressure) < 140/90      Psoriasis    *  *  Past Surgical History:   Procedure Laterality Date     ABDOMEN SURGERY       COLONOSCOPY     *  *  Current Outpatient Prescriptions   Medication Sig Dispense Refill     fluticasone (FLONASE) 50 MCG/ACT spray Spray 2 sprays into both nostrils daily 16 g 3     atenolol (TENORMIN) 25 MG  tablet TAKE 1 TABLET(25 MG) BY MOUTH DAILY 90 tablet 1     lovastatin (MEVACOR) 20 MG tablet Take 1 tablet (20 mg) by mouth At Bedtime 90 tablet 3     acyclovir (ZOVIRAX) 400 MG tablet Take 1 tablet (400 mg) by mouth 3 times daily 30 tablet 0     aspirin 81 MG EC tablet Take 1 tablet (81 mg) by mouth daily 90 tablet 3     cholecalciferol (VITAMIN D) 1000 UNIT tablet Take 2 tablets (2,000 Units) by mouth daily 100 tablet 3       ROS:     A 10-point review of systems was performed and is positive for that noted in the HPI and as seen below.  All other areas are negative.     Numbness in feet?  no   Calf pain with walking? no  Recent foot/ankle injury? no  Weight change over past 12 months? 20# loss  Self perception as overweight? yes  Recent flu-like symptoms? no  Joint pain other than feet ? Right thumb, occasional bilateral knee pain    Social History: Employment:  no;  Exercise/Physical activity:  Walking 4x/ week, biking, gardening, weights;  Tobacco use:  no  Social History     Social History     Marital status: Single     Spouse name: N/A     Number of children: N/A     Years of education: N/A     Occupational History     Not on file.     Social History Main Topics     Smoking status: Never Smoker     Smokeless tobacco: Not on file     Alcohol use 2.4 oz/week     Drug use: No     Sexual activity: No     Other Topics Concern     Parent/Sibling W/ Cabg, Mi Or Angioplasty Before 65f 55m? Yes     Social History Narrative       Family history:  Family History   Problem Relation Age of Onset     Breast Cancer Mother      Hypertension Mother      Hyperlipidemia Mother      Depression Mother      Hypertension Brother      Hyperlipidemia Brother      Hypertension Sister      Hyperlipidemia Sister      Coronary Artery Disease Father      MI     Hypertension Father      Hyperlipidemia Father      Substance Abuse Father      Coronary Artery Disease Brother      Hypertension Brother      Substance Abuse Brother       "Hypertension Sister      Hyperlipidemia Sister      Depression Sister      Substance Abuse Sister      Other Cancer No family hx of        Rheumatoid arthritis:  no  Foot Problems: no  Diabetes: no      EXAM:    Vitals: Ht 5' 5\" (1.651 m)  Wt 158 lb (71.7 kg)  BMI 26.29 kg/m2  BMI: Body mass index is 26.29 kg/(m^2).  Height: 5' 5\"    Constitutional/ general:  Pt is in no apparent distress, appears well-nourished.  Cooperative with history and physical exam.     Vascular:  Pedal pulses are palpable bilaterally for both the DP and PT arteries.  CFT < 3 sec.  No edema.  Pedal hair growth noted.     Neuro:  Alert and oriented x 3. Coordinated gait.  Light touch sensation is intact to the L4, L5, S1 distributions. No obvious deficits.  No evidence of neurological-based weakness, spasticity, or contracture in the lower extremities.     Derm: Normal texture and turgor.  No erythema, ecchymosis, or cyanosis.  No open lesions.  Several palpable, small hyperkeratotic lesions sub left 5th met head. Difficult to see, due to small size.  Post pairing, still uncertain if verrucoid or not.  No erythema, edema, tenderness along the lateral skin fold of the right hallux.     Musculoskeletal:    Lower extremity muscle strength is normal.  Patient is ambulatory without an assistive device or brace .  Decrease in medial longitudinal arch with weight bearing.  Hallux abducto valgus deformity, left foot.  Reducible in the transverse plane with preserved sagittal plane ROM.  No crepitus.  Right 2nd toe deviates medially and contacts the hallux.       Lit Blanco DPM, EBONY, MS    [place] [facility]le [facility]              "

## 2017-09-22 ENCOUNTER — OFFICE VISIT (OUTPATIENT)
Dept: FAMILY MEDICINE | Facility: CLINIC | Age: 72
End: 2017-09-22
Payer: COMMERCIAL

## 2017-09-22 DIAGNOSIS — C44.712: ICD-10-CM

## 2017-09-22 DIAGNOSIS — D18.01 CAPILLARY HEMANGIOMA OF SKIN: ICD-10-CM

## 2017-09-22 DIAGNOSIS — D23.5 BENIGN NEOPLASM OF SKIN OF TRUNK, EXCEPT SCROTUM: ICD-10-CM

## 2017-09-22 DIAGNOSIS — L68.9 HYPERTRICHOSIS: ICD-10-CM

## 2017-09-22 DIAGNOSIS — L82.0 INFLAMED SEBORRHEIC KERATOSIS: ICD-10-CM

## 2017-09-22 DIAGNOSIS — D22.9 MULTIPLE BENIGN MELANOCYTIC NEVI: ICD-10-CM

## 2017-09-22 DIAGNOSIS — L21.9 SEBORRHEIC DERMATITIS: ICD-10-CM

## 2017-09-22 DIAGNOSIS — N95.2 ATROPHIC VAGINITIS: ICD-10-CM

## 2017-09-22 DIAGNOSIS — L29.3 PRURITUS OF GENITALIA: ICD-10-CM

## 2017-09-22 DIAGNOSIS — Z85.828 HISTORY OF BASAL CELL CARCINOMA: ICD-10-CM

## 2017-09-22 DIAGNOSIS — L81.4 SOLAR LENTIGO: ICD-10-CM

## 2017-09-22 DIAGNOSIS — D48.5 NEOPLASM OF UNCERTAIN BEHAVIOR OF SKIN: Primary | ICD-10-CM

## 2017-09-22 PROCEDURE — 88305 TISSUE EXAM BY PATHOLOGIST: CPT | Performed by: FAMILY MEDICINE

## 2017-09-22 PROCEDURE — 11301 SHAVE SKIN LESION 0.6-1.0 CM: CPT | Performed by: FAMILY MEDICINE

## 2017-09-22 PROCEDURE — 99214 OFFICE O/P EST MOD 30 MIN: CPT | Mod: 25 | Performed by: FAMILY MEDICINE

## 2017-09-22 PROCEDURE — 11301 SHAVE SKIN LESION 0.6-1.0 CM: CPT | Mod: 59 | Performed by: FAMILY MEDICINE

## 2017-09-22 PROCEDURE — 99000 SPECIMEN HANDLING OFFICE-LAB: CPT | Performed by: FAMILY MEDICINE

## 2017-09-22 RX ORDER — KETOCONAZOLE 20 MG/G
CREAM TOPICAL 2 TIMES DAILY
Qty: 30 G | Refills: 1 | Status: SHIPPED | OUTPATIENT
Start: 2017-09-22 | End: 2018-03-29

## 2017-09-22 RX ORDER — CLOBETASOL PROPIONATE 0.5 MG/G
OINTMENT TOPICAL
Qty: 15 G | Refills: 0 | Status: SHIPPED | OUTPATIENT
Start: 2017-09-22 | End: 2017-10-27

## 2017-09-22 NOTE — PATIENT INSTRUCTIONS
"FUTURE APPOINTMENTS    Follow up per pathology report.    Follow up in 3 weeks for re-evaluation of itchiness on perineum.    Follow up annually for a full-body skin cancer screening.    Consider following up for laser treatment with a specialty clinic.    Apply ketoconazole 2% cream twice daily to affected areas on the face for at least 2 weeks. Continue to use as needed.    TOPICAL STEROID INSTRUCTIONS  Clobetasol propionate 0.05% ointment.    Apply an amount equal to half of a fingertip (0.25 g) to the affected area(s) on the perineum, two times per day for 1 week.    After 1 week, decrease to once daily application for 2 weeks.    \"Fingertip Amount\"        WOUND CARE INSTRUCTIONS  1. After 24 hours, change dressing daily.  2. Wash hands before every dressing change.  3. Wash the wound area with a mild soap, then rinse  4. Gently pat dry with a sterile gauze or Q-tip.  5. Apply Vaseline or Aquaphor only over entire wound. Do NOT use Neosporin - as many people react to neomycin.  6. Finally, cover with a bandage or sterile non-stick gauze with micropore paper tape.  7. Repeat once daily until wound has healed.    Do not let the wound dry out.  The wound will heal faster and with better cosmetic results if routinely kept moist with step #5. It is a false belief that a wound heals better when it is exposed to air and allowed to dry out.      Soap, water and shampoo will not hurt this area.    Do not go swimming or take baths, but showering is encouraged.    Limit use of the area where the procedure was done for a few days to allow for optimal healing.    If you experience bleeding:  Repeat steps #2-5 and hold firm pressure on the area for 10 minutes without checking to see if the bleeding has stopped. \"Checking\" pulls off the protective wound clot and restarts the bleeding all over again. Re-apply pressure for 10 minutes if necessary to stop bleeding.  Use additional sterile gauze and tape to maintain pressure once " bleeding has stopped.    Signs of Infection:  Infection can occur in any area where skin has been disrupted.  If you notice persistent redness, swelling, colored drainage, increasing pain, fever or other signs of infection, please call us at: [phone] and ask to have me or my colleague paged. We will call you back to discuss.    Pathology Results:  You will be notified, generally via letter or MyChart, in approximately 10 days. If there is anything we need to discuss or further treatment needed, I will call you to discuss it.    Skin tissue can be some of the most challenging tissue for pathologists to examine, therefore we may use a specialist outside the [facility] system to read certain submitted tissue samples. When submitted to these outside specialists, [facility] will notify you that your tissue sample result has returned. However, this only means that the tissue sample has been sent to the specialist. These results are not available in [employer], so I will contact you when I receive the final report.    PATIENT INFORMATION : WOUNDS  During the healing process you will notice a number of changes. All wounds develop a small halo of redness surrounding the wound.  This means healing is occurring. Severe itching with extensive redness usually indicates sensitivity to the ointment or bandage tape used to dress the wound.  You should call our office if this develops.      Swelling  and/or discoloration around your surgical site is common, particularly when performed around the eye.    All wounds normally drain.  The larger the wound the more drainage there will be.  After 7-10 days, you will notice the wound beginning to shrink and new skin will begin to grow.  The wound is healed when you can see skin has formed over the entire area.  A healed wound has a healthy, shiny look to the surface and is red to dark pink in color to normalize.  Wounds may take approximately 4-6 weeks to heal.  Larger wounds may take 6-8  "weeks. After the wound is healed you may discontinue dressing changes.    You may experience a sensation of tightness as your wound heals. This is normal and will gradually subside.    Your healed wound may be sensitive to temperature changes. This sensitivity improves with time, but if you re having a lot of discomfort, try to avoid temperature extremes.    Patients frequently experience itching after their wound appears to have healed because of the continue healing under the skin.  Plain Vaseline will help relieve the itching.    SUN PROTECTION INSTRUCTIONS  Sun damage can lead to skin cancer and premature aging of the skin.      The best way to protect from sun damage to your skin is to avoid the sun during peak hours (10 am - 2 pm) even on overcast days.      Use UPF sun-protective clothing, which while more expensive initially provides longer lasting coverage without having to worry about remembering to re-apply.  1. Wear a wide-brimmed hat and sunglasses.   2. Wear sun-protective clothing.  [employer] and other [employer] make sun protective clothing that are stylish, comfortable and cool. [employer] and other [employer] make UV arm sleeves suitable for golfing, gardening and other activities.      Sunscreen instructions:  1. Use sunscreens with Zinc Oxide, Titanium Dioxide or Avobenzone to protect from UVA rays.  2. Use SPF 30-50+ to protect from UVB rays.  3. Re-apply every 2 hours even if water resistant.  4. Apply on your face every day even when cloudy and even in the winter. UVA \"aging rays\" penetrate window glass and are just as strong in the winter as in the summer.    Product Recommendations:    Good examples include: Peter Garcia, EltaMD, Solbar    Good daily moisturizers with SPF: Vanicream, CeraVe.    For sensitive skin, consider : [employer] Essential Defense Mineral Shield Broad Spectrum SPF 35      Never use tanning beds. Tanning beds are associated with much higher risks of skin " "cancer.    All tanning damages the skin. Aim for ivory skin year round and you will have less trouble with your skin in years to come. There is no merit in getting \"a base tan\" before a warm weather vacation, as any tanning indicates your body's response to sun damage.    Stop smoking. Smokers have higher rates of skin cancer and also have premature skin wrinkling.    FYI  You should use about 3 tablespoons of sunscreen to protect your whole body. Thus a typical eight ounce bottle of sunscreen should last 4 applications. Remember, that the SPF rating is compromised if you don t apply enough. Most people only apply 1/2 - 1/3 of the amount they need. Also don t forget areas such as your ears, feet, upper back and harder to reach places. Keep in mind that these amounts should be increased for larger body sizes.    Sunscreens with titanium dioxide and/or zinc oxide in the active ingredients are physical blockers as opposed to chemical blockers. Chemical-free sunscreens should not irritate the skin.    Spray-on sunscreens may be used for touch-up application only, not as a base layer. Also, use with caution around small children due to inhalation risk.    Avoid retinyl palmitate products.    Avoid combination products that include both sunscreen and insect repellant, as sunscreen should be applied every 2 hours, but insect repellant should not be applied as frequently.    SPF means sun protection factor, which is just the degree to which the sunscreen can protect against UVB rays. There is no rating system for UVA rays. SPF is calculated as the time skin will burn when sunscreen is applied vs. skin without sunscreen.    Water resistant sunscreens should be re-applied every 1-2 hours.    For more information:  http://www.skincancer.org/prevention/sun-protection/sunscreen/sunscreens-safe-and-effective    SKIN CANCER SELF-EXAM INSTRUCTIONS  Check every month in the mirror or with a household member. Be aware of any changes, " especially bleeding or tenderness. Also, make sure to check your nails for color changes after removal of nail polish.    For melanoma, check for:  A - Asymmetry. One half unlike the other half.  B - Border. Irregular, scalloped, ragged, notched, blurred or poorly defined borders.  C - Color. Color variations from one area to another, with shades of tan, brown and/or black present. Sometimes white, red or blue.  D - Diameter. Greater than 6 mm (about the size of a pencil eraser). Any new growth of a mole should be concerning and be evaluated.  E - Evolving. A mole or skin lesion that looks different from the rest or is changing in size, shape or color.    For basal cell carcinoma and squamous cell carcinoma, check for:    Sores, shiny bumps, nodules, scaly lesions, or wart-like growths that are itchy, tender, crusting, scabbing, eroding, oozing or bleeding.    Open sores/wounds or reddish/irritated areas that do not heal within 2-3 weeks.    Scar-like areas that are white, yellow or waxy in color.    SUPPLEMENTS  You may continue to take biotin supplements.

## 2017-09-22 NOTE — MR AVS SNAPSHOT
"              After Visit Summary   9/22/2017    Lidia Jenkins    MRN: [id number]           Patient Information     Date Of Birth          1945        Visit Information        Provider Department      9/22/2017 10:00 AM Mary Linder MD [facility] - Primary Care Skin        Today's Diagnoses     Neoplasm of uncertain behavior of skin    -  1    History of basal cell carcinoma        Hypertrichosis        Benign neoplasm of skin of trunk, except scrotum        Solar lentigo        Multiple benign melanocytic nevi        Seborrheic dermatitis        Capillary hemangioma of skin        Lichen sclerosus et atrophicus          Care Instructions    FUTURE APPOINTMENTS    Follow up per pathology report.    Follow up in 3 weeks for re-evaluation of itchiness on perineum.    Follow up annually for a full-body skin cancer screening.    Consider following up for laser treatment with a specialty clinic.    Apply ketoconazole 2% cream twice daily to affected areas on the face for at least 2 weeks. Continue to use as needed.    TOPICAL STEROID INSTRUCTIONS  Clobetasol propionate 0.05% ointment.    Apply an amount equal to half of a fingertip (0.25 g) to the affected area(s) on the perineum, two times per day for 1 week.    After 1 week, decrease to once daily application for 2 weeks.    \"Fingertip Amount\"        WOUND CARE INSTRUCTIONS  1. After 24 hours, change dressing daily.  2. Wash hands before every dressing change.  3. Wash the wound area with a mild soap, then rinse  4. Gently pat dry with a sterile gauze or Q-tip.  5. Apply Vaseline or Aquaphor only over entire wound. Do NOT use Neosporin - as many people react to neomycin.  6. Finally, cover with a bandage or sterile non-stick gauze with micropore paper tape.  7. Repeat once daily until wound has healed.    Do not let the wound dry out.  The wound will heal faster and with better cosmetic results if routinely kept moist with step #5. It is a " "false belief that a wound heals better when it is exposed to air and allowed to dry out.      Soap, water and shampoo will not hurt this area.    Do not go swimming or take baths, but showering is encouraged.    Limit use of the area where the procedure was done for a few days to allow for optimal healing.    If you experience bleeding:  Repeat steps #2-5 and hold firm pressure on the area for 10 minutes without checking to see if the bleeding has stopped. \"Checking\" pulls off the protective wound clot and restarts the bleeding all over again. Re-apply pressure for 10 minutes if necessary to stop bleeding.  Use additional sterile gauze and tape to maintain pressure once bleeding has stopped.    Signs of Infection:  Infection can occur in any area where skin has been disrupted.  If you notice persistent redness, swelling, colored drainage, increasing pain, fever or other signs of infection, please call us at: [phone] and ask to have me or my colleague paged. We will call you back to discuss.    Pathology Results:  You will be notified, generally via letter or [facility], in approximately 10 days. If there is anything we need to discuss or further treatment needed, I will call you to discuss it.    Skin tissue can be some of the most challenging tissue for pathologists to examine, therefore we may use a specialist outside the [facility] system to read certain submitted tissue samples. When submitted to these outside specialists, [facility] will notify you that your tissue sample result has returned. However, this only means that the tissue sample has been sent to the specialist. These results are not available in [facility], so I will contact you when I receive the final report.    PATIENT INFORMATION : WOUNDS  During the healing process you will notice a number of changes. All wounds develop a small halo of redness surrounding the wound.  This means healing is occurring. Severe itching with extensive redness usually " indicates sensitivity to the ointment or bandage tape used to dress the wound.  You should call our office if this develops.      Swelling  and/or discoloration around your surgical site is common, particularly when performed around the eye.    All wounds normally drain.  The larger the wound the more drainage there will be.  After 7-10 days, you will notice the wound beginning to shrink and new skin will begin to grow.  The wound is healed when you can see skin has formed over the entire area.  A healed wound has a healthy, shiny look to the surface and is red to dark pink in color to normalize.  Wounds may take approximately 4-6 weeks to heal.  Larger wounds may take 6-8 weeks. After the wound is healed you may discontinue dressing changes.    You may experience a sensation of tightness as your wound heals. This is normal and will gradually subside.    Your healed wound may be sensitive to temperature changes. This sensitivity improves with time, but if you re having a lot of discomfort, try to avoid temperature extremes.    Patients frequently experience itching after their wound appears to have healed because of the continue healing under the skin.  Plain Vaseline will help relieve the itching.    SUN PROTECTION INSTRUCTIONS  Sun damage can lead to skin cancer and premature aging of the skin.      The best way to protect from sun damage to your skin is to avoid the sun during peak hours (10 am - 2 pm) even on overcast days.      Use UPF sun-protective clothing, which while more expensive initially provides longer lasting coverage without having to worry about remembering to re-apply.  1. Wear a wide-brimmed hat and sunglasses.   2. Wear sun-protective clothing.  [employer] other [employer] make sun protective clothing that are stylish, comfortable and cool. [employer] and other [employer] make UV arm sleeves suitable for golfing, gardening and other activities.      Sunscreen instructions:  1. Use  "sunscreens with Zinc Oxide, Titanium Dioxide or Avobenzone to protect from UVA rays.  2. Use SPF 30-50+ to protect from UVB rays.  3. Re-apply every 2 hours even if water resistant.  4. Apply on your face every day even when cloudy and even in the winter. UVA \"aging rays\" penetrate window glass and are just as strong in the winter as in the summer.    Product Recommendations:    Good examples include: Blue Lizard, EltaMD, Solbar    Good daily moisturizers with SPF: Vanicream, CeraVe.    For sensitive skin, consider : SkinMedica Essential Defense Mineral Shield Broad Spectrum SPF 35      Never use tanning beds. Tanning beds are associated with much higher risks of skin cancer.    All tanning damages the skin. Aim for ivory skin year round and you will have less trouble with your skin in years to come. There is no merit in getting \"a base tan\" before a warm weather vacation, as any tanning indicates your body's response to sun damage.    Stop smoking. Smokers have higher rates of skin cancer and also have premature skin wrinkling.    FYI  You should use about 3 tablespoons of sunscreen to protect your whole body. Thus a typical eight ounce bottle of sunscreen should last 4 applications. Remember, that the SPF rating is compromised if you don t apply enough. Most people only apply 1/2 - 1/3 of the amount they need. Also don t forget areas such as your ears, feet, upper back and harder to reach places. Keep in mind that these amounts should be increased for larger body sizes.    Sunscreens with titanium dioxide and/or zinc oxide in the active ingredients are physical blockers as opposed to chemical blockers. Chemical-free sunscreens should not irritate the skin.    Spray-on sunscreens may be used for touch-up application only, not as a base layer. Also, use with caution around small children due to inhalation risk.    Avoid retinyl palmitate products.    Avoid combination products that include both sunscreen and insect " repellant, as sunscreen should be applied every 2 hours, but insect repellant should not be applied as frequently.    SPF means sun protection factor, which is just the degree to which the sunscreen can protect against UVB rays. There is no rating system for UVA rays. SPF is calculated as the time skin will burn when sunscreen is applied vs. skin without sunscreen.    Water resistant sunscreens should be re-applied every 1-2 hours.    For more information:  http://www.skincancer.org/prevention/sun-protection/sunscreen/sunscreens-safe-and-effective    SKIN CANCER SELF-EXAM INSTRUCTIONS  Check every month in the mirror or with a household member. Be aware of any changes, especially bleeding or tenderness. Also, make sure to check your nails for color changes after removal of nail polish.    For melanoma, check for:  A - Asymmetry. One half unlike the other half.  B - Border. Irregular, scalloped, ragged, notched, blurred or poorly defined borders.  C - Color. Color variations from one area to another, with shades of tan, brown and/or black present. Sometimes white, red or blue.  D - Diameter. Greater than 6 mm (about the size of a pencil eraser). Any new growth of a mole should be concerning and be evaluated.  E - Evolving. A mole or skin lesion that looks different from the rest or is changing in size, shape or color.    For basal cell carcinoma and squamous cell carcinoma, check for:    Sores, shiny bumps, nodules, scaly lesions, or wart-like growths that are itchy, tender, crusting, scabbing, eroding, oozing or bleeding.    Open sores/wounds or reddish/irritated areas that do not heal within 2-3 weeks.    Scar-like areas that are white, yellow or waxy in color.    SUPPLEMENTS  You may continue to take biotin supplements.          Follow-ups after your visit        Who to contact     If you have questions or need follow up information about today's clinic visit or your schedule please contact [facility] -  PRIMARY CARE SKIN directly at [phone].  Normal or non-critical lab and imaging results will be communicated to you by MyChart, letter or phone within 4 business days after the clinic has received the results. If you do not hear from us within 7 days, please contact the clinic through [facility] or phone. If you have a critical or abnormal lab result, we will notify you by phone as soon as possible.  Submit refill requests through [employer] or call your pharmacy and they will forward the refill request to us. Please allow 3 business days for your refill to be completed.          Additional Information About Your Visit        [facility] Information     [employer] gives you secure access to your electronic health record. If you see a primary care provider, you can also send messages to your care team and make appointments. If you have questions, please call your primary care clinic.  If you do not have a primary care provider, please call [phone] and they will assist you.        Care EveryWhere ID     This is your Care EveryWhere ID. This could be used by other organizations to access your [facility] medical records  [id number]         Blood Pressure from Last 3 Encounters:   08/11/17 120/78   07/05/17 112/70   06/06/16 110/78    Weight from Last 3 Encounters:   08/31/17 158 lb (71.7 kg)   08/11/17 158 lb (71.7 kg)   07/05/17 157 lb (71.2 kg)              We Performed the Following     CL SURG PATH [facility] DERM     SHAV SKIN LESION TRUNK/ARM/LEG 0.6-1.0 CM          Today's Medication Changes          These changes are accurate as of: 9/22/17 10:26 AM.  If you have any questions, ask your nurse or doctor.               Start taking these medicines.        Dose/Directions    ketoconazole 2 % cream   Commonly known as:  NIZORAL   Used for:  Seborrheic dermatitis   Started by:  Mary Linder MD        Apply topically 2 times daily   Quantity:  30 g   Refills:  1            Where to get your medicines       These medications were sent to [id number] - [place] [id number] [facility] [address] [place] [address] [facility] [address]     Phone:  [phone]     ketoconazole 2 % cream                Primary Care Provider Office Phone # Fax #    Jessicaamberly Cheema PA-C [phone]       [address]  [facility] [address]        Equal Access to Services     LUKE LORENZ : Hadii aad ku hadasho Soomaali, waaxda luqadaha, qaybta kaalmada adeegyada, waxay idiin hayaan adeeg kharash la'bernice . [facility] [phone].    ATENCIÓN: Si habla español, tiene a rivas disposición servicios gratuitos de asistencia lingüística. [facility] [phone].    We comply with applicable federal civil rights laws and [place] laws. We do not discriminate on the basis of race, color, national origin, age, disability sex, sexual orientation or gender identity.            Thank you!     Thank you for choosing [facility] - PRIMARY CARE SKIN  for your care. Our goal is always to provide you with excellent care. Hearing back from our patients is one way we can continue to improve our services. Please take a few minutes to complete the written survey that you may receive in the mail after your visit with us. Thank you!             Your Updated Medication List - Protect others around you: Learn how to safely use, store and throw away your medicines at www.disposemymeds.org.          This list is accurate as of: 9/22/17 10:26 AM.  Always use your most recent med list.                   Brand Name Dispense Instructions for use Diagnosis    acyclovir 400 MG tablet    ZOVIRAX    30 tablet    Take 1 tablet (400 mg) by mouth 3 times daily    Herpes simplex infection of perianal skin       aspirin 81 MG EC tablet     90 tablet    Take 1 tablet (81 mg) by mouth daily        atenolol 25 MG tablet    TENORMIN    90 tablet    TAKE 1 TABLET(25 MG) BY MOUTH DAILY    Hypertension goal BP (blood pressure)  < 140/90, Routine general medical examination at a health care facility       cholecalciferol 1000 UNIT tablet    vitamin D    100 tablet    Take 2 tablets (2,000 Units) by mouth daily    Encounter for vitamin deficiency screening       fluticasone 50 MCG/ACT spray    FLONASE    16 g    Spray 2 sprays into both nostrils daily    Throat pain       ketoconazole 2 % cream    NIZORAL    30 g    Apply topically 2 times daily    Seborrheic dermatitis       lovastatin 20 MG tablet    MEVACOR    90 tablet    Take 1 tablet (20 mg) by mouth At Bedtime    Hyperlipidemia, unspecified hyperlipidemia type, Routine general medical examination at a health care facility

## 2017-09-22 NOTE — PROGRESS NOTES
[facility] - PRIMARY CARE SKIN    CC : skin cancer screening (full-body)  SUBJECTIVE:                                                    Lidia Jenkins is a 72 year old female who presents to clinic today for a full-body skin exam because of her history of basal cell carcinoma.    Bothersome lesions noticed by the patient or other skin concerns :  Issue One : Lesion on right knee.  Onset : 2 years.  Issue Two : Crusty lesion on right posterior leg has bled once in a while.  Issue Three: Scaliness, redness and itchiness noted between eyebrows. No dandruff on scalp  Issue Four: Intense itchiness on perineum without visible changes. No estrogen cream, but she has been instructed to apply desitin and sitz baths. This started around the anus 2 years ago but has begun to involve the perineum now.      Issue Five: Hair thinning asking about biotin supplementation  Issue Six: Requesting laser treatment for chin hair.      Personal history of skin cancer : YES - basal cell carcinoma on back.  Family history of skin cancer : NO.  Psoriasis : YES    Sun Exposure History  Previous history of significant sun exposure:  Blistering sunburns : NO  Tanning beds : NO.  Sunscreen Use : YES, frequency : daily on face and used on body when outdoors.  UV-protective clothing use : NO  Wide-brimmed hats : YES  UV-protective sunglasses : YES  Avoids mid-day sun : YES    Occupation : retired  (both indoor & outdoor).    Refer to electronic medical record (EMR) for past medical history and medications.    INTEGUMENTARY/SKIN: POSITIVE for non-healing lesions, pruritis and hirsutism  ROS : 14 point review of systems was negative except the symptoms listed above in the HPI.    This document serves as a record of the services and decisions personally performed and made by Yary Linder MD. It was created on her behalf by Patrick Yeung, a trained medical scribe.  The creation of this document is based on the scribe's  personal observations and the provider's statements to the medical scribe.  Patrick Yeung, September 22, 2017 9:50 AM      OBJECTIVE:                                                    GENERAL: healthy, alert and no distress  SKIN: Ng Skin Type - I.  This patient was examined from the top of the head to the bottom of the feet  including scalp, face, neck, back, chest, breasts, buttocks, both arms, both legs, both hands, both feet, all 10 fingers and all 10 toes. The dermatoscope was used to help evaluate pigmented lesions.  Skin Pertinent Findings:  Face, Arms, Legs : Multiple, brown, macule(s) most consistent with benign solar lentigo.     Arms : Scattered, 2 mm - 3 mm in size, brown macules most consistent with benign nevi (melanocytic nevi).    Chest, Abdomen, Back : Scattered, slightly raised, red lesion(s) consistent with capillary hemangioma. brown, macule(s) most consistent with benign solar lentigo. brown macules most consistent with benign nevi (melanocytic nevi).     Back : 2 mm - 3 mm in size, brown macules most consistent with benign nevi (melanocytic nevi) on back.    Legs : 2 mm - 4 mm in size, brown macules most consistent with benign nevi (melanocytic nevi) on back.                          Labia minora : Atrophy and slight whitish plaque on right labia minora.  Significant Findings:  Right medial patella: 5 mm x 6 mm in size, erythematous, scaly, indurated lesion. ? Basal cell carcinoma ? Squamous cell carcinoma ? Actinic keratosis ? Other       Right posterior mid-lower leg : 6 mm in size, raised, erythematous lesion. ? Traumatized keratosis ? Squamous cell carcinoma ? Other      Face : Erythema and light scaling in glabella extending into eyebrows.    Diagnostic Test Results:  none     MDM : There is obvious atrophic changes of the labia and perineum but cannot entirely rule out lichen sclerosis et atrophicus      ASSESSMENT:                                                      Encounter  "Diagnoses   Name Primary?     Neoplasm of uncertain behavior of skin Yes     History of basal cell carcinoma      Hypertrichosis      Benign neoplasm of skin of trunk, except scrotum      Solar lentigo      Multiple benign melanocytic nevi      Seborrheic dermatitis      Capillary hemangioma of skin          PLAN:                                                    Patient Instructions   FUTURE APPOINTMENTS  Follow up per pathology report.  Follow up annually for a full-body skin cancer screening.    Apply ketoconazole 2% cream twice daily to affected areas on the face for at least 2 weeks. Continue to use as needed.    WOUND CARE INSTRUCTIONS  1. After 24 hours, change dressing daily.  2. Wash hands before every dressing change.  3. Wash the wound area with a mild soap, then rinse  4. Gently pat dry with a sterile gauze or Q-tip.  5. Apply Vaseline or Aquaphor only over entire wound. Do NOT use Neosporin - as many people react to neomycin.  6. Finally, cover with a bandage or sterile non-stick gauze with micropore paper tape.  7. Repeat once daily until wound has healed.    Do not let the wound dry out.  The wound will heal faster and with better cosmetic results if routinely kept moist with step #5. It is a false belief that a wound heals better when it is exposed to air and allowed to dry out.      Soap, water and shampoo will not hurt this area.    Do not go swimming or take baths, but showering is encouraged.    Limit use of the area where the procedure was done for a few days to allow for optimal healing.    If you experience bleeding:  Repeat steps #2-5 and hold firm pressure on the area for 10 minutes without checking to see if the bleeding has stopped. \"Checking\" pulls off the protective wound clot and restarts the bleeding all over again. Re-apply pressure for 10 minutes if necessary to stop bleeding.  Use additional sterile gauze and tape to maintain pressure once bleeding has stopped.    Signs of " Infection:  Infection can occur in any area where skin has been disrupted.  If you notice persistent redness, swelling, colored drainage, increasing pain, fever or other signs of infection, please call us at: [phone] and ask to have me or my colleague paged. We will call you back to discuss.    Pathology Results:  You will be notified, generally via letter or MyChart, in approximately 10 days. If there is anything we need to discuss or further treatment needed, I will call you to discuss it.    Skin tissue can be some of the most challenging tissue for pathologists to examine, therefore we may use a specialist outside the [facility] system to read certain submitted tissue samples. When submitted to these outside specialists, [facility] will notify you that your tissue sample result has returned. However, this only means that the tissue sample has been sent to the specialist. These results are not available in [facility], so I will contact you when I receive the final report.    PATIENT INFORMATION : WOUNDS  During the healing process you will notice a number of changes. All wounds develop a small halo of redness surrounding the wound.  This means healing is occurring. Severe itching with extensive redness usually indicates sensitivity to the ointment or bandage tape used to dress the wound.  You should call our office if this develops.      Swelling  and/or discoloration around your surgical site is common, particularly when performed around the eye.    All wounds normally drain.  The larger the wound the more drainage there will be.  After 7-10 days, you will notice the wound beginning to shrink and new skin will begin to grow.  The wound is healed when you can see skin has formed over the entire area.  A healed wound has a healthy, shiny look to the surface and is red to dark pink in color to normalize.  Wounds may take approximately 4-6 weeks to heal.  Larger wounds may take 6-8 weeks. After the wound is healed  "you may discontinue dressing changes.    You may experience a sensation of tightness as your wound heals. This is normal and will gradually subside.    Your healed wound may be sensitive to temperature changes. This sensitivity improves with time, but if you re having a lot of discomfort, try to avoid temperature extremes.    Patients frequently experience itching after their wound appears to have healed because of the continue healing under the skin.  Plain Vaseline will help relieve the itching.    SUN PROTECTION INSTRUCTIONS  Sun damage can lead to skin cancer and premature aging of the skin.      The best way to protect from sun damage to your skin is to avoid the sun during peak hours (10 am - 2 pm) even on overcast days.      Use UPF sun-protective clothing, which while more expensive initially provides longer lasting coverage without having to worry about remembering to re-apply.  1. Wear a wide-brimmed hat and sunglasses.   2. Wear sun-protective clothing.  [employer] and other [employer] make sun protective clothing that are stylish, comfortable and cool. [employer]uri and other [employer] make UV arm sleeves suitable for golfing, gardening and other activities.      Sunscreen instructions:  1. Use sunscreens with Zinc Oxide, Titanium Dioxide or Avobenzone to protect from UVA rays.  2. Use SPF 30-50+ to protect from UVB rays.  3. Re-apply every 2 hours even if water resistant.  4. Apply on your face every day even when cloudy and even in the winter. UVA \"aging rays\" penetrate window glass and are just as strong in the winter as in the summer.    Product Recommendations:    Good examples include: Peter Garcia, EltaMD, Solbar    Good daily moisturizers with SPF: Vanicream, CeraVe.    For sensitive skin, consider : SkinMedica Essential Defense Mineral Shield Broad Spectrum SPF 35      Never use tanning beds. Tanning beds are associated with much higher risks of skin cancer.    All tanning damages the " "skin. Aim for ivory skin year round and you will have less trouble with your skin in years to come. There is no merit in getting \"a base tan\" before a warm weather vacation, as any tanning indicates your body's response to sun damage.    Stop smoking. Smokers have higher rates of skin cancer and also have premature skin wrinkling.    FYI  You should use about 3 tablespoons of sunscreen to protect your whole body. Thus a typical eight ounce bottle of sunscreen should last 4 applications. Remember, that the SPF rating is compromised if you don t apply enough. Most people only apply 1/2 - 1/3 of the amount they need. Also don t forget areas such as your ears, feet, upper back and harder to reach places. Keep in mind that these amounts should be increased for larger body sizes.    Sunscreens with titanium dioxide and/or zinc oxide in the active ingredients are physical blockers as opposed to chemical blockers. Chemical-free sunscreens should not irritate the skin.    Spray-on sunscreens may be used for touch-up application only, not as a base layer. Also, use with caution around small children due to inhalation risk.    Avoid retinyl palmitate products.    Avoid combination products that include both sunscreen and insect repellant, as sunscreen should be applied every 2 hours, but insect repellant should not be applied as frequently.    SPF means sun protection factor, which is just the degree to which the sunscreen can protect against UVB rays. There is no rating system for UVA rays. SPF is calculated as the time skin will burn when sunscreen is applied vs. skin without sunscreen.    Water resistant sunscreens should be re-applied every 1-2 hours.    For more information:  http://www.skincancer.org/prevention/sun-protection/sunscreen/sunscreens-safe-and-effective    SKIN CANCER SELF-EXAM INSTRUCTIONS  Check every month in the mirror or with a household member. Be aware of any changes, especially bleeding or tenderness. " Also, make sure to check your nails for color changes after removal of nail polish.    For melanoma, check for:  A - Asymmetry. One half unlike the other half.  B - Border. Irregular, scalloped, ragged, notched, blurred or poorly defined borders.  C - Color. Color variations from one area to another, with shades of tan, brown and/or black present. Sometimes white, red or blue.  D - Diameter. Greater than 6 mm (about the size of a pencil eraser). Any new growth of a mole should be concerning and be evaluated.  E - Evolving. A mole or skin lesion that looks different from the rest or is changing in size, shape or color.    For basal cell carcinoma and squamous cell carcinoma, check for:    Sores, shiny bumps, nodules, scaly lesions, or wart-like growths that are itchy, tender, crusting, scabbing, eroding, oozing or bleeding.    Open sores/wounds or reddish/irritated areas that do not heal within 2-3 weeks.    Scar-like areas that are white, yellow or waxy in color.    SUPPLEMENTS  You may continue to take biotin supplements.    The patient was counseled about sunscreens and sun avoidance. The patient was counseled to check the skin regularly and report any lesion that is new, changing, itching, scabbing, bleeding or otherwise bothersome. The patient was discharged ambulatory and in stable condition.      PROCEDURES:                                                    Name : Shave Excision  Indication : Excision of tissue for pathology evaluation.  Location(s) : Right medial patella: 5 mm x 6 mm in size, erythematous, scaly, indurated lesion. ? Basal cell carcinoma ? Squamous cell carcinoma ? Actinic keratosis ? Other.  Completed by : Yary Linder MD  Photo Taken : yes.  Anesthesia : Patient was anesthetized by infiltrating the area surrounding the lesion with 1% lidocaine.   epinephrine [id number]:781995 : Yes.  Buffered with bicarbonate : No.  Note : Discussed the risk of pain, infection, scarring, hypo- or hyperpigmentation  and recurrence or need for re-treatment. The benefits of treatment and alternative treatments were also discussed.    During this procedure, the universal protocol was utilized. The patient's identity was confirmed by no less than two patient identifiers, correct procedure was verified, correct site was verified and marked as applicable and a final pause was completed.    Sterile technique was used throughout the procedure. The skin was cleaned and prepped with surgical cleanser. Once adequate anesthesia was obtained, the lesion was removed with a deep scallop shave procedure. The specimen was sent to pathology.    Direct pressure and monsels's and monopolar cautery was applied for hemostasis. No bleeding was present upon the completion of the procedure. The wound was coated with antibacterial ointment. A dry sterile dressing was applied. Patient tolerated the procedure well and left in satisfactory condition.    Primary provider and referring provider will be informed regarding the tissue report when it returns.    Name : Shave Excision  Indication : Excision of tissue for pathology evaluation.  Location(s) : Right posterior mid-lower leg : 6 mm in size, raised, erythematous lesion. ? Traumatized keratosis ? Squamous cell carcinoma ? Other.  Completed by : Yary Linder MD  Photo Taken : yes.  Anesthesia : Patient was anesthetized by infiltrating the area surrounding the lesion with 1% lidocaine.   epinephrine [id number]:[id number] : Yes.  Buffered with bicarbonate : No.  Note : Discussed the risk of pain, infection, scarring, hypo- or hyperpigmentation and recurrence or need for re-treatment. The benefits of treatment and alternative treatments were also discussed.    During this procedure, the universal protocol was utilized. The patient's identity was confirmed by no less than two patient identifiers, correct procedure was verified, correct site was verified and marked as applicable and a final pause was  completed.    Sterile technique was used throughout the procedure. The skin was cleaned and prepped with surgical cleanser. Once adequate anesthesia was obtained, the lesion was removed with a deep scallop shave procedure. The specimen was sent to pathology.    Direct pressure and monsels's and monopolar cautery was applied for hemostasis. No bleeding was present upon the completion of the procedure. The wound was coated with antibacterial ointment. A dry sterile dressing was applied. Patient tolerated the procedure well and left in satisfactory condition.    Primary provider and referring provider will be informed regarding the tissue report when it returns.      The information in this document, created by the medical scribe for me, accurately reflects the services I personally performed and the decisions made by me. I have reviewed and approved this document for accuracy prior to leaving the patient care area.  Yary Linder MD September 22, 2017 9:50 AM  [facility] - PRIMARY CARE SKIN

## 2017-09-27 NOTE — PROGRESS NOTES
ADDENDUM:                                                    September 27, 2017 6:56 AM  Pathology report results:

## 2017-09-29 ENCOUNTER — TELEPHONE (OUTPATIENT)
Dept: FAMILY MEDICINE | Facility: CLINIC | Age: 72
End: 2017-09-29

## 2017-09-29 NOTE — TELEPHONE ENCOUNTER
"Encounter : phonecall  Discussed lab results :       Pathology report : superficial basal cell carcinoma       Recommendations :  Reexcision            She has an appointment for recheck on OCT 20 2017  .    An can you expand that appointment time to allow for a 40\" excision ?     "

## 2017-10-11 ENCOUNTER — ALLIED HEALTH/NURSE VISIT (OUTPATIENT)
Dept: NURSING | Facility: CLINIC | Age: 72
End: 2017-10-11
Payer: COMMERCIAL

## 2017-10-11 DIAGNOSIS — Z23 NEED FOR PROPHYLACTIC VACCINATION AND INOCULATION AGAINST INFLUENZA: Primary | ICD-10-CM

## 2017-10-11 PROCEDURE — 99207 ZZC NO CHARGE NURSE ONLY: CPT

## 2017-10-11 PROCEDURE — G0008 ADMIN INFLUENZA VIRUS VAC: HCPCS

## 2017-10-11 PROCEDURE — 90662 IIV NO PRSV INCREASED AG IM: CPT

## 2017-10-11 NOTE — PROGRESS NOTES
Injectable Influenza Immunization Documentation    1.  Is the person to be vaccinated sick today?   No    2. Does the person to be vaccinated have an allergy to a component   of the vaccine?   No    3. Has the person to be vaccinated ever had a serious reaction   to influenza vaccine in the past?   No    4. Has the person to be vaccinated ever had Guillain-Barré syndrome?   No    Form completed by Twyla العراقي MA

## 2017-10-11 NOTE — MR AVS SNAPSHOT
After Visit Summary   10/11/2017    Lidia Jenkins    MRN: [id number]           Patient Information     Date Of Birth          1945        Visit Information        Provider Department      10/11/2017 2:00 PM  [facility] NURSE [facility]        Today's Diagnoses     Need for prophylactic vaccination and inoculation against influenza    -  1       Follow-ups after your visit        Your next 10 appointments already scheduled     Oct 27, 2017 10:00 AM CDT   Office Visit with Mary Linder MD   [facility] - Primary Care Skin ([facility] Skin )    [address] [place] [address]  [address] [place] [id number] [phone]           Bring a current list of meds and any records pertaining to this visit. For Physicals, please bring immunization records and any forms needing to be filled out. Please arrive 10 minutes early to complete paperwork.              Who to contact     If you have questions or need follow up information about today's clinic visit or your schedule please contact [facility] directly at [phone].  Normal or non-critical lab and imaging results will be communicated to you by [email or web address], letter or phone within 4 business days after the clinic has received the results. If you do not hear from us within 7 days, please contact the clinic through [email or web address] or phone. If you have a critical or abnormal lab result, we will notify you by phone as soon as possible.  Submit refill requests through [facility] or call your pharmacy and they will forward the refill request to us. Please allow 3 business days for your refill to be completed.          Additional Information About Your Visit        MyChart Information     [facility] gives you secure access to your electronic health record. If you see a primary care provider, you can also send messages to your care team and make appointments. If you have questions, please call your primary  care clinic.  If you do not have a primary care provider, please call [phone] and they will assist you.        Care EveryWhere ID     This is your Care EveryWhere ID. This could be used by other organizations to access your [facility] medical records  [id number]         Blood Pressure from Last 3 Encounters:   08/11/17 120/78   07/05/17 112/70   06/06/16 110/78    Weight from Last 3 Encounters:   08/31/17 158 lb (71.7 kg)   08/11/17 158 lb (71.7 kg)   07/05/17 157 lb (71.2 kg)              We Performed the Following     FLU VACCINE, INCREASED ANTIGEN, PRESV FREE, AGE 65+ [80935]     Vaccine Administration, Initial [62989]        Primary Care Provider Office Phone # Fax #    Jessica Cheema PA-C [phone]       [address]  [facility] [address]        Equal Access to Services     LUKE LORENZ : Hadii aad ku hadasho Soomaali, waaxda luqadaha, qaybta kaalmada adeegyada, waxay soilain haybernice gregory . So [facility] [phone].    ATENCIÓN: Si habla español, tiene a rivas disposición servicios gratuitos de asistencia lingüística. Llame al [phone].    We comply with applicable federal civil rights laws and [place] laws. We do not discriminate on the basis of race, color, national origin, age, disability, sex, sexual orientation, or gender identity.            Thank you!     Thank you for choosing [facility]  for your care. Our goal is always to provide you with excellent care. Hearing back from our patients is one way we can continue to improve our services. Please take a few minutes to complete the written survey that you may receive in the mail after your visit with us. Thank you!             Your Updated Medication List - Protect others around you: Learn how to safely use, store and throw away your medicines at www.disposemymeds.org.          This list is accurate as of: 10/11/17  2:10 PM.  Always use your most recent med list.                    Brand Name Dispense Instructions for use Diagnosis    acyclovir 400 MG tablet    ZOVIRAX    30 tablet    Take 1 tablet (400 mg) by mouth 3 times daily    Herpes simplex infection of perianal skin       aspirin 81 MG EC tablet     90 tablet    Take 1 tablet (81 mg) by mouth daily        atenolol 25 MG tablet    TENORMIN    90 tablet    TAKE 1 TABLET(25 MG) BY MOUTH DAILY    Hypertension goal BP (blood pressure) < 140/90, Routine general medical examination at a health care facility       cholecalciferol 1000 UNIT tablet    vitamin D    100 tablet    Take 2 tablets (2,000 Units) by mouth daily    Encounter for vitamin deficiency screening       clobetasol 0.05 % ointment    TEMOVATE    15 g    Apply sparingly to affected area bid for 1 week, qd for 2 weeks  Do not apply to face.    Pruritus of genitalia       fluticasone 50 MCG/ACT spray    FLONASE    16 g    Spray 2 sprays into both nostrils daily    Throat pain       ketoconazole 2 % cream    NIZORAL    30 g    Apply topically 2 times daily    Seborrheic dermatitis       lovastatin 20 MG tablet    MEVACOR    90 tablet    Take 1 tablet (20 mg) by mouth At Bedtime    Hyperlipidemia, unspecified hyperlipidemia type, Routine general medical examination at a health care facility

## 2017-10-27 ENCOUNTER — OFFICE VISIT (OUTPATIENT)
Dept: FAMILY MEDICINE | Facility: CLINIC | Age: 72
End: 2017-10-27
Payer: COMMERCIAL

## 2017-10-27 DIAGNOSIS — N95.2 ATROPHIC VAGINITIS: ICD-10-CM

## 2017-10-27 DIAGNOSIS — Z85.828 HISTORY OF BASAL CELL CARCINOMA: ICD-10-CM

## 2017-10-27 DIAGNOSIS — C44.712: Primary | ICD-10-CM

## 2017-10-27 DIAGNOSIS — L29.3 PRURITUS OF GENITALIA: ICD-10-CM

## 2017-10-27 PROCEDURE — 99213 OFFICE O/P EST LOW 20 MIN: CPT | Mod: 25 | Performed by: FAMILY MEDICINE

## 2017-10-27 PROCEDURE — 12032 INTMD RPR S/A/T/EXT 2.6-7.5: CPT | Performed by: FAMILY MEDICINE

## 2017-10-27 PROCEDURE — 88305 TISSUE EXAM BY PATHOLOGIST: CPT | Performed by: FAMILY MEDICINE

## 2017-10-27 PROCEDURE — 11602 EXC TR-EXT MAL+MARG 1.1-2 CM: CPT | Performed by: FAMILY MEDICINE

## 2017-10-27 PROCEDURE — 99000 SPECIMEN HANDLING OFFICE-LAB: CPT | Performed by: FAMILY MEDICINE

## 2017-10-27 RX ORDER — CLOBETASOL PROPIONATE 0.5 MG/G
OINTMENT TOPICAL
Qty: 30 G | Refills: 0 | Status: SHIPPED | OUTPATIENT
Start: 2017-10-27 | End: 2018-03-29

## 2017-10-27 NOTE — LETTER
10/27/2017         RE: Lidia Jenkins  [address] [place] [address]        Dear Colleague,    Thank you for referring your patient, Lidia Jenkins, to the [facility] - PRIMARY [facility] SKIN. Please see a copy of my visit note below.    [facility] - PRIMARY CARE SKIN    CC : Wide excision   SUBJECTIVE:                                                    Lidia Jenkins is a 72 year old female who presents to clinic today for follow-up wide excision of basal cell carcinoma on the right patella.    Tissue Pathology Report from 9/22/2017      Issue Two : Ketoconazole 2% cream on the face has controlled rash on the face  Issue Three : Clobetasol propionate 0.05% ointment has also improved the skin irritation on the labia and perineum.  ? If bidet has exacerbated the itchiness.    Personal history of skin cancer : YES - basal cell carcinoma on back.  Family history of skin cancer : NO.  Psoriasis : YES     Sun Exposure History  Previous history of significant sun exposure:  Blistering sunburns : NO  Tanning beds : NO.  Sunscreen Use : YES, frequency : daily on face and used on body when outdoors.  UV-protective clothing use : NO  Wide-brimmed hats : YES  UV-protective sunglasses : YES  Avoids mid-day sun : YES     Occupation : retired  (both indoor & outdoor).    Refer to electronic medical record (EMR) for past medical history and medications.    ROS : 14 point review of systems was negative except the symptoms listed above in the HPI.    This document serves as a record of the services and decisions personally performed and made by Yary Linder MD. It was created on her behalf by Patrick Yeung, a trained medical scribe.  The creation of this document is based on the scribe's personal observations and the provider's statements to the medical scribe.  Patrick Yeung, October 27, 2017 9:45 AM      OBJECTIVE:                                                    GENERAL: healthy,  alert and no distress  SKIN: Ng Skin Type - I.  Face, Legs and Genitalia were examined. The dermatoscope was used to help evaluate pigmented lesions.  Skin Pertinent Findings:  Right medial patella : 5 mm x 7 mm in size eschar over previous shave site. Previous pathology read superficial basal cell carcinoma.    Vaginal introitus : Less erythema, atrophic, no hypopigmentation      ASSESSMENT:                                                      Encounter Diagnoses   Name Primary?     Basal cell carcinoma of right knee Yes     Pruritus of genitalia          PLAN:                                                    Patient Instructions   FUTURE APPOINTMENTS    Follow up in 2 weeks for Suture Removal, with the nurse at any [facility] clinic. If you go elsewhere, make sure to call ahead of time to get on their schedule.    Follow up in 3 months for re-evaluation of excision site.    Follow up in Sept 2018 for annual full-body skin cancer screening.    If any problems, contact Mary Linder MD at cell: [phone]    Avoid lengthy walks or strenuous motions of the right knee until sutures are moved.    WOUND CARE INSTRUCTIONS  1. After 24 hours, change dressing daily.  2. Wash hands before every dressing change.  3. Wash the wound area with a mild soap, then rinse  4. Gently pat dry with a sterile gauze or Q-tip.  5. Apply Vaseline or Aquaphor only over entire wound. Do NOT use Neosporin - as many people react to neomycin.  6. Finally, cover with a bandage or sterile non-stick gauze with micropore paper tape.  7. Repeat once daily until wound has healed.    Do not let the wound dry out.  The wound will heal faster and with better cosmetic results if routinely kept moist with step #5. It is a false belief that a wound heals better when it is exposed to air and allowed to dry out.      Soap, water and shampoo will not hurt this area.    Do not go swimming or take baths, but showering is encouraged.    Limit  "use of the area where the procedure was done for a few days to allow for optimal healing.    If you experience bleeding:  Repeat steps #2-5 and hold firm pressure on the area for 10 minutes without checking to see if the bleeding has stopped. \"Checking\" pulls off the protective wound clot and restarts the bleeding all over again. Re-apply pressure for 10 minutes if necessary to stop bleeding.  Use additional sterile gauze and tape to maintain pressure once bleeding has stopped.    Signs of Infection:  Infection can occur in any area where skin has been disrupted.  If you notice persistent redness, swelling, colored drainage, increasing pain, fever or other signs of infection, please call us at: [phone] and ask to have me or my colleague paged. We will call you back to discuss.    Pathology Results:  You will be notified, generally via letter or MyChart, in approximately 10 days. If there is anything we need to discuss or further treatment needed, I will call you to discuss it.    Skin tissue can be some of the most challenging tissue for pathologists to examine, therefore we may use a specialist outside the [employer] system to read certain submitted tissue samples. When submitted to these outside specialists, [employer] will notify you that your tissue sample result has returned. However, this only means that the tissue sample has been sent to the specialist. These results are not available in [employer], so I will contact you when I receive the final report.    PATIENT INFORMATION : WOUNDS  During the healing process you will notice a number of changes. All wounds develop a small halo of redness surrounding the wound.  This means healing is occurring. Severe itching with extensive redness usually indicates sensitivity to the ointment or bandage tape used to dress the wound.  You should call our office if this develops.      Swelling  and/or discoloration around your surgical site is common, particularly when " performed around the eye.    All wounds normally drain.  The larger the wound the more drainage there will be.  After 7-10 days, you will notice the wound beginning to shrink and new skin will begin to grow.  The wound is healed when you can see skin has formed over the entire area.  A healed wound has a healthy, shiny look to the surface and is red to dark pink in color to normalize.  Wounds may take approximately 4-6 weeks to heal.  Larger wounds may take 6-8 weeks. After the wound is healed you may discontinue dressing changes.    You may experience a sensation of tightness as your wound heals. This is normal and will gradually subside.    Your healed wound may be sensitive to temperature changes. This sensitivity improves with time, but if you re having a lot of discomfort, try to avoid temperature extremes.    Patients frequently experience itching after their wound appears to have healed because of the continue healing under the skin.  Plain Vaseline will help relieve the itching.    PAIN CONTROL INSTRUCTIONS    First, try either acetaminophen (Tylenol), or NSAID ibuprofen (Advil, Susanne, etc), or NSAID naproxen (Aleve, Naprosyn)    Acetaminophen dosage : one 325-500 mg tablet taken every 4-6 hours with a maximum of 4000 mg/day = 4 g/day    Ibuprofen dosage : one 600 mg tablet taken every 4-6 hours with a maximum of 4-6 tablets/day    Naproxen dosage : one 500 mg tablet taken twice daily with a maximum of 2 tablets/day     Second, try combining acetaminophen and an NSAID - often the combination will work better then either one alone.      TOPICAL MEDICATION INSTRUCTIONS  Ketoconazole 2% cream. Continue applying as needed for control of seborrheic dermatitis rash symptoms on the face.  Continue applying facial moisturizer regularly.    TOPICAL STEROID INSTRUCTIONS   Clobetasol propionate 0.05% ointment.    Apply an amount equal to half of a fingertip (0.25 g) to the affected area(s) on the labia and perineum  "only as needed (approximately twice weekly).    \"Fingertip Amount\"      This is a super-potent steroid, and it should only be used on the affected area on the labia and perineum.    Topical steroid use is for short-term treatment only. If after initial treatment, you are using this for prolonged periods of time, return to clinic for re-evaluation of treatment.    TOPICAL MEDICATION  1. Apply Premarin cream three times weekly for 1 week  2. After 1 week, decrease to once weekly application for 3 more weeks.  3. Discontinue after the 4 weeks.    Please label all tubes with area of application.        PROCEDURES:                                                    Name : Wide Excision  Indication : Wide excision of tissue for pathology evaluation of malignancy.  Location(s) : Right medial patella : 5 mm x 7 mm in size eschar over previous shave site. Previous pathology read superficial basal cell carcinoma.  Completed by : Yary Linder MD  Photo Taken : no.  Anesthesia : Patient was anesthetized by infiltrating the area surrounding the lesion with 1% lidocaine.   epinephrine [id number] : Yes.  Buffered with bicarbonate : Yes.  Note : Prior to procedure we discussed expectations for healing, risk of infection, and scar formation. Discussed other treatment options available. Discussed the risk of pain, infection, scarring, hypo- or hyperpigmentation and recurrence or need for re-treatment. The benefits of treatment and alternative treatments were also discussed.    During this procedure, the universal protocol was utilized. The patient's identity was confirmed by no less than two patient identifiers, correct procedure was verified, correct site was verified and marked as applicable and a final pause was completed.    Sterile technique was used throughout the procedure. The skin was cleaned and prepped with Chloroprep. A 4 mm margin was marked out on each side of the lesion. Sterile drapes were laid out. Once adequate " anesthesia was obtained, an elliptical incision was made with a #15 blade through the epidermis and dermis. The tissue specimen was placed in formalin and sent to pathology.    Direct pressure and monopolar cautery was applied for hemostasis. Edges were undermined with a Metzenbaum. Defect was approximated with 3-0 Vicryl and edges were approximated with 3-0 Prolene horizontal mattress and simple interrupted sutures. Minimal bleeding occurred. Dressing was applied and patient left in satisfactory condition.    Widest diameter : 14 mm.  Length : 5 cm.    Total number of stitches in closure of epidermis : 6    Primary provider and referring provider will be informed regarding the wound culture and tissue sample report when it returns.    Suture removal : 10-14 days.      The information in this document, created by the medical scribe for me, accurately reflects the services I personally performed and the decisions made by me. I have reviewed and approved this document for accuracy prior to leaving the patient care area.  Yary Linder MD October 27, 2017 9:45 AM  [facility] - PRIMARY CARE SKIN    Again, thank you for allowing me to participate in the care of your patient.        Sincerely,        Mary Linder MD

## 2017-10-27 NOTE — PATIENT INSTRUCTIONS
"FUTURE APPOINTMENTS    Follow up in 2 weeks for Suture Removal, with the nurse at any [facility] clinic. If you go elsewhere, make sure to call ahead of time to get on their schedule.    Follow up in 3 months for re-evaluation of excision site.    Follow up in Sept 2018 for annual full-body skin cancer screening.    If any problems, contact Mary Linder MD at cell: [phone]    Avoid lengthy walks or strenuous motions of the right knee until sutures are moved.    WOUND CARE INSTRUCTIONS  1. After 24 hours, change dressing daily.  2. Wash hands before every dressing change.  3. Wash the wound area with a mild soap, then rinse  4. Gently pat dry with a sterile gauze or Q-tip.  5. Apply Vaseline or Aquaphor only over entire wound. Do NOT use Neosporin - as many people react to neomycin.  6. Finally, cover with a bandage or sterile non-stick gauze with micropore paper tape.  7. Repeat once daily until wound has healed.    Do not let the wound dry out.  The wound will heal faster and with better cosmetic results if routinely kept moist with step #5. It is a false belief that a wound heals better when it is exposed to air and allowed to dry out.      Soap, water and shampoo will not hurt this area.    Do not go swimming or take baths, but showering is encouraged.    Limit use of the area where the procedure was done for a few days to allow for optimal healing.    If you experience bleeding:  Repeat steps #2-5 and hold firm pressure on the area for 10 minutes without checking to see if the bleeding has stopped. \"Checking\" pulls off the protective wound clot and restarts the bleeding all over again. Re-apply pressure for 10 minutes if necessary to stop bleeding.  Use additional sterile gauze and tape to maintain pressure once bleeding has stopped.    Signs of Infection:  Infection can occur in any area where skin has been disrupted.  If you notice persistent redness, swelling, colored drainage, increasing pain, " fever or other signs of infection, please call us at: [phone] and ask to have me or my colleague paged. We will call you back to discuss.    Pathology Results:  You will be notified, generally via letter or MyChart, in approximately 10 days. If there is anything we need to discuss or further treatment needed, I will call you to discuss it.    Skin tissue can be some of the most challenging tissue for pathologists to examine, therefore we may use a specialist outside the [employer] system to read certain submitted tissue samples. When submitted to these outside specialists, [employer] will notify you that your tissue sample result has returned. However, this only means that the tissue sample has been sent to the specialist. These results are not available in [employer], so I will contact you when I receive the final report.    PATIENT INFORMATION : WOUNDS  During the healing process you will notice a number of changes. All wounds develop a small halo of redness surrounding the wound.  This means healing is occurring. Severe itching with extensive redness usually indicates sensitivity to the ointment or bandage tape used to dress the wound.  You should call our office if this develops.      Swelling  and/or discoloration around your surgical site is common, particularly when performed around the eye.    All wounds normally drain.  The larger the wound the more drainage there will be.  After 7-10 days, you will notice the wound beginning to shrink and new skin will begin to grow.  The wound is healed when you can see skin has formed over the entire area.  A healed wound has a healthy, shiny look to the surface and is red to dark pink in color to normalize.  Wounds may take approximately 4-6 weeks to heal.  Larger wounds may take 6-8 weeks. After the wound is healed you may discontinue dressing changes.    You may experience a sensation of tightness as your wound heals. This is normal and will gradually subside.    Your  "healed wound may be sensitive to temperature changes. This sensitivity improves with time, but if you re having a lot of discomfort, try to avoid temperature extremes.    Patients frequently experience itching after their wound appears to have healed because of the continue healing under the skin.  Plain Vaseline will help relieve the itching.    PAIN CONTROL INSTRUCTIONS    First, try either acetaminophen (Tylenol), or NSAID ibuprofen (Advil, Susanne, etc), or NSAID naproxen (Aleve, Naprosyn)    Acetaminophen dosage : one 325-500 mg tablet taken every 4-6 hours with a maximum of 4000 mg/day = 4 g/day    Ibuprofen dosage : one 600 mg tablet taken every 4-6 hours with a maximum of 4-6 tablets/day    Naproxen dosage : one 500 mg tablet taken twice daily with a maximum of 2 tablets/day     Second, try combining acetaminophen and an NSAID - often the combination will work better then either one alone.      TOPICAL MEDICATION INSTRUCTIONS  Ketoconazole 2% cream. Continue applying as needed for control of seborrheic dermatitis rash symptoms on the face.  Continue applying facial moisturizer regularly.    TOPICAL STEROID INSTRUCTIONS   Clobetasol propionate 0.05% ointment.    Apply an amount equal to half of a fingertip (0.25 g) to the affected area(s) on the labia and perineum only as needed (approximately twice weekly).    \"Fingertip Amount\"      This is a super-potent steroid, and it should only be used on the affected area on the labia and perineum.    Topical steroid use is for short-term treatment only. If after initial treatment, you are using this for prolonged periods of time, return to clinic for re-evaluation of treatment.    TOPICAL MEDICATION  1. Apply Premarin cream three times weekly for 1 week  2. After 1 week, decrease to once weekly application for 3 more weeks.  3. Discontinue after the 4 weeks.    Please label all tubes with area of application.  "

## 2017-10-27 NOTE — PROGRESS NOTES
[facility] - PRIMARY CARE SKIN    CC : Wide excision   SUBJECTIVE:                                                    Lidia Jenkins is a 72 year old female who presents to clinic today for follow-up wide excision of basal cell carcinoma on the right patella.    Tissue Pathology Report from 9/22/2017      Issue Two : Ketoconazole 2% cream on the face has controlled rash on the face  Issue Three : Clobetasol propionate 0.05% ointment has also improved the skin irritation on the labia and perineum.  ? If bidet has exacerbated the itchiness.    Personal history of skin cancer : YES - basal cell carcinoma on back.  Family history of skin cancer : NO.  Psoriasis : YES     Sun Exposure History  Previous history of significant sun exposure:  Blistering sunburns : NO  Tanning beds : NO.  Sunscreen Use : YES, frequency : daily on face and used on body when outdoors.  UV-protective clothing use : NO  Wide-brimmed hats : YES  UV-protective sunglasses : YES  Avoids mid-day sun : YES     Occupation : retired  (both indoor & outdoor).    Refer to electronic medical record (EMR) for past medical history and medications.    ROS : 14 point review of systems was negative except the symptoms listed above in the HPI.    This document serves as a record of the services and decisions personally performed and made by Yary Linder MD. It was created on her behalf by Patrick Yeung, a trained medical scribe.  The creation of this document is based on the scribe's personal observations and the provider's statements to the medical scribe.  Patrick Yeung, October 27, 2017 9:45 AM      OBJECTIVE:                                                    GENERAL: healthy, alert and no distress  SKIN: Ng Skin Type - I.  Face, Legs and Genitalia were examined. The dermatoscope was used to help evaluate pigmented lesions.  Skin Pertinent Findings:  Right medial patella : 5 mm x 7 mm in size eschar over previous shave site.  "Previous pathology read superficial basal cell carcinoma.    Vaginal introitus : Less erythema, atrophic, no hypopigmentation      ASSESSMENT:                                                      Encounter Diagnoses   Name Primary?     Basal cell carcinoma of right knee Yes     Pruritus of genitalia          PLAN:                                                    Patient Instructions   FUTURE APPOINTMENTS    Follow up in 2 weeks for Suture Removal, with the nurse at any [place] clinic. If you go elsewhere, make sure to call ahead of time to get on their schedule.    Follow up in 3 months for re-evaluation of excision site.    Follow up in Sept 2018 for annual full-body skin cancer screening.    If any problems, contact Mary Linder MD at cell: [phone]    Avoid lengthy walks or strenuous motions of the right knee until sutures are moved.    WOUND CARE INSTRUCTIONS  1. After 24 hours, change dressing daily.  2. Wash hands before every dressing change.  3. Wash the wound area with a mild soap, then rinse  4. Gently pat dry with a sterile gauze or Q-tip.  5. Apply Vaseline or Aquaphor only over entire wound. Do NOT use Neosporin - as many people react to neomycin.  6. Finally, cover with a bandage or sterile non-stick gauze with micropore paper tape.  7. Repeat once daily until wound has healed.    Do not let the wound dry out.  The wound will heal faster and with better cosmetic results if routinely kept moist with step #5. It is a false belief that a wound heals better when it is exposed to air and allowed to dry out.      Soap, water and shampoo will not hurt this area.    Do not go swimming or take baths, but showering is encouraged.    Limit use of the area where the procedure was done for a few days to allow for optimal healing.    If you experience bleeding:  Repeat steps #2-5 and hold firm pressure on the area for 10 minutes without checking to see if the bleeding has stopped. \"Checking\" pulls off " the protective wound clot and restarts the bleeding all over again. Re-apply pressure for 10 minutes if necessary to stop bleeding.  Use additional sterile gauze and tape to maintain pressure once bleeding has stopped.    Signs of Infection:  Infection can occur in any area where skin has been disrupted.  If you notice persistent redness, swelling, colored drainage, increasing pain, fever or other signs of infection, please call us at: [phone] and ask to have me or my colleague paged. We will call you back to discuss.    Pathology Results:  You will be notified, generally via letter or MyChart, in approximately 10 days. If there is anything we need to discuss or further treatment needed, I will call you to discuss it.    Skin tissue can be some of the most challenging tissue for pathologists to examine, therefore we may use a specialist outside the [employer] system to read certain submitted tissue samples. When submitted to these outside specialists, [employer] will notify you that your tissue sample result has returned. However, this only means that the tissue sample has been sent to the specialist. These results are not available in [employer], so I will contact you when I receive the final report.    PATIENT INFORMATION : WOUNDS  During the healing process you will notice a number of changes. All wounds develop a small halo of redness surrounding the wound.  This means healing is occurring. Severe itching with extensive redness usually indicates sensitivity to the ointment or bandage tape used to dress the wound.  You should call our office if this develops.      Swelling  and/or discoloration around your surgical site is common, particularly when performed around the eye.    All wounds normally drain.  The larger the wound the more drainage there will be.  After 7-10 days, you will notice the wound beginning to shrink and new skin will begin to grow.  The wound is healed when you can see skin has formed over the  "entire area.  A healed wound has a healthy, shiny look to the surface and is red to dark pink in color to normalize.  Wounds may take approximately 4-6 weeks to heal.  Larger wounds may take 6-8 weeks. After the wound is healed you may discontinue dressing changes.    You may experience a sensation of tightness as your wound heals. This is normal and will gradually subside.    Your healed wound may be sensitive to temperature changes. This sensitivity improves with time, but if you re having a lot of discomfort, try to avoid temperature extremes.    Patients frequently experience itching after their wound appears to have healed because of the continue healing under the skin.  Plain Vaseline will help relieve the itching.    PAIN CONTROL INSTRUCTIONS    First, try either acetaminophen (Tylenol), or NSAID ibuprofen (Advil, Susanne, etc), or NSAID naproxen (Aleve, Naprosyn)    Acetaminophen dosage : one 325-500 mg tablet taken every 4-6 hours with a maximum of 4000 mg/day = 4 g/day    Ibuprofen dosage : one 600 mg tablet taken every 4-6 hours with a maximum of 4-6 tablets/day    Naproxen dosage : one 500 mg tablet taken twice daily with a maximum of 2 tablets/day     Second, try combining acetaminophen and an NSAID - often the combination will work better then either one alone.      TOPICAL MEDICATION INSTRUCTIONS  Ketoconazole 2% cream. Continue applying as needed for control of seborrheic dermatitis rash symptoms on the face.  Continue applying facial moisturizer regularly.    TOPICAL STEROID INSTRUCTIONS   Clobetasol propionate 0.05% ointment.    Apply an amount equal to half of a fingertip (0.25 g) to the affected area(s) on the labia and perineum only as needed (approximately twice weekly).    \"Fingertip Amount\"      This is a super-potent steroid, and it should only be used on the affected area on the labia and perineum.    Topical steroid use is for short-term treatment only. If after initial treatment, you are " using this for prolonged periods of time, return to clinic for re-evaluation of treatment.    TOPICAL MEDICATION  1. Apply Premarin cream three times weekly for 1 week  2. After 1 week, decrease to once weekly application for 3 more weeks.  3. Discontinue after the 4 weeks.    Please label all tubes with area of application.        PROCEDURES:                                                    Name : Wide Excision  Indication : Wide excision of tissue for pathology evaluation of malignancy.  Location(s) : Right medial patella : 5 mm x 7 mm in size eschar over previous shave site. Previous pathology read superficial basal cell carcinoma.  Completed by : Yary Linder MD  Photo Taken : no.  Anesthesia : Patient was anesthetized by infiltrating the area surrounding the lesion with 1% lidocaine.   epinephrine [id number] : Yes.  Buffered with bicarbonate : Yes.  Note : Prior to procedure we discussed expectations for healing, risk of infection, and scar formation. Discussed other treatment options available. Discussed the risk of pain, infection, scarring, hypo- or hyperpigmentation and recurrence or need for re-treatment. The benefits of treatment and alternative treatments were also discussed.    During this procedure, the universal protocol was utilized. The patient's identity was confirmed by no less than two patient identifiers, correct procedure was verified, correct site was verified and marked as applicable and a final pause was completed.    Sterile technique was used throughout the procedure. The skin was cleaned and prepped with Chloroprep. A 4 mm margin was marked out on each side of the lesion. Sterile drapes were laid out. Once adequate anesthesia was obtained, an elliptical incision was made with a #15 blade through the epidermis and dermis. The tissue specimen was placed in formalin and sent to pathology.    Direct pressure and monopolar cautery was applied for hemostasis. Edges were undermined with a  John. Defect was approximated with 3-0 Vicryl and edges were approximated with 3-0 Prolene horizontal mattress and simple interrupted sutures. Minimal bleeding occurred. Dressing was applied and patient left in satisfactory condition.    Widest diameter : 14 mm.  Length : 5 cm.    Total number of stitches in closure of epidermis : 6    Primary provider and referring provider will be informed regarding the wound culture and tissue sample report when it returns.    Suture removal : 10-14 days.      The information in this document, created by the medical scribe for me, accurately reflects the services I personally performed and the decisions made by me. I have reviewed and approved this document for accuracy prior to leaving the patient care area.  Yary Linder MD October 27, 2017 9:45 AM  [facility] - PRIMARY CARE SKIN

## 2017-10-27 NOTE — MR AVS SNAPSHOT
After Visit Summary   10/27/2017    Lidia Jenkins    MRN: [id number]           Patient Information     Date Of Birth          1945        Visit Information        Provider Department      10/27/2017 10:00 AM Mary Linder MD [facility] - Primary Care Skin        Today's Diagnoses     Basal cell carcinoma of right knee    -  1    Pruritus of genitalia          Care Instructions    FUTURE APPOINTMENTS    Follow up in 2 weeks for Suture Removal, with the nurse at any [facility]. If you go elsewhere, make sure to call ahead of time to get on their schedule.    Follow up in 3 months for re-evaluation of excision site.    Follow up in Sept 2018 for annual full-body skin cancer screening.    If any problems, contact Mary Linder MD at cell: [phone]    Avoid lengthy walks or strenuous motions of the right knee until sutures are moved.    WOUND CARE INSTRUCTIONS  1. After 24 hours, change dressing daily.  2. Wash hands before every dressing change.  3. Wash the wound area with a mild soap, then rinse  4. Gently pat dry with a sterile gauze or Q-tip.  5. Apply Vaseline or Aquaphor only over entire wound. Do NOT use Neosporin - as many people react to neomycin.  6. Finally, cover with a bandage or sterile non-stick gauze with micropore paper tape.  7. Repeat once daily until wound has healed.    Do not let the wound dry out.  The wound will heal faster and with better cosmetic results if routinely kept moist with step #5. It is a false belief that a wound heals better when it is exposed to air and allowed to dry out.      Soap, water and shampoo will not hurt this area.    Do not go swimming or take baths, but showering is encouraged.    Limit use of the area where the procedure was done for a few days to allow for optimal healing.    If you experience bleeding:  Repeat steps #2-5 and hold firm pressure on the area for 10 minutes without checking to see if the bleeding  "has stopped. \"Checking\" pulls off the protective wound clot and restarts the bleeding all over again. Re-apply pressure for 10 minutes if necessary to stop bleeding.  Use additional sterile gauze and tape to maintain pressure once bleeding has stopped.    Signs of Infection:  Infection can occur in any area where skin has been disrupted.  If you notice persistent redness, swelling, colored drainage, increasing pain, fever or other signs of infection, please call us at: [phone] and ask to have me or my colleague paged. We will call you back to discuss.    Pathology Results:  You will be notified, generally via letter or MyChart, in approximately 10 days. If there is anything we need to discuss or further treatment needed, I will call you to discuss it.    Skin tissue can be some of the most challenging tissue for pathologists to examine, therefore we may use a specialist outside the [employer] system to read certain submitted tissue samples. When submitted to these outside specialists, [employer] will notify you that your tissue sample result has returned. However, this only means that the tissue sample has been sent to the specialist. These results are not available in [employer], so I will contact you when I receive the final report.    PATIENT INFORMATION : WOUNDS  During the healing process you will notice a number of changes. All wounds develop a small halo of redness surrounding the wound.  This means healing is occurring. Severe itching with extensive redness usually indicates sensitivity to the ointment or bandage tape used to dress the wound.  You should call our office if this develops.      Swelling  and/or discoloration around your surgical site is common, particularly when performed around the eye.    All wounds normally drain.  The larger the wound the more drainage there will be.  After 7-10 days, you will notice the wound beginning to shrink and new skin will begin to grow.  The wound is healed when " "you can see skin has formed over the entire area.  A healed wound has a healthy, shiny look to the surface and is red to dark pink in color to normalize.  Wounds may take approximately 4-6 weeks to heal.  Larger wounds may take 6-8 weeks. After the wound is healed you may discontinue dressing changes.    You may experience a sensation of tightness as your wound heals. This is normal and will gradually subside.    Your healed wound may be sensitive to temperature changes. This sensitivity improves with time, but if you re having a lot of discomfort, try to avoid temperature extremes.    Patients frequently experience itching after their wound appears to have healed because of the continue healing under the skin.  Plain Vaseline will help relieve the itching.    PAIN CONTROL INSTRUCTIONS    First, try either acetaminophen (Tylenol), or NSAID ibuprofen (Advil, Susanne, etc), or NSAID naproxen (Aleve, Naprosyn)    Acetaminophen dosage : one 325-500 mg tablet taken every 4-6 hours with a maximum of 4000 mg/day = 4 g/day    Ibuprofen dosage : one 600 mg tablet taken every 4-6 hours with a maximum of 4-6 tablets/day    Naproxen dosage : one 500 mg tablet taken twice daily with a maximum of 2 tablets/day     Second, try combining acetaminophen and an NSAID - often the combination will work better then either one alone.      TOPICAL MEDICATION INSTRUCTIONS  Ketoconazole 2% cream. Continue applying as needed for control of seborrheic dermatitis rash symptoms on the face.  Continue applying facial moisturizer regularly.    TOPICAL STEROID INSTRUCTIONS   Clobetasol propionate 0.05% ointment.    Apply an amount equal to half of a fingertip (0.25 g) to the affected area(s) on the labia and perineum only as needed (approximately twice weekly).    \"Fingertip Amount\"      This is a super-potent steroid, and it should only be used on the affected area on the labia and perineum.    Topical steroid use is for short-term treatment only. " If after initial treatment, you are using this for prolonged periods of time, return to clinic for re-evaluation of treatment.    TOPICAL MEDICATION  1. Apply Premarin cream three times weekly for 1 week  2. After 1 week, decrease to once weekly application for 3 more weeks.  3. Discontinue after the 4 weeks.    Please label all tubes with area of application.          Follow-ups after your visit        Who to contact     If you have questions or need follow up information about today's clinic visit or your schedule please contact [facility] - PRIMARY CARE SKIN directly at [phone].  Normal or non-critical lab and imaging results will be communicated to you by [employer], letter or phone within 4 business days after the clinic has received the results. If you do not hear from us within 7 days, please contact the clinic through [employer] or phone. If you have a critical or abnormal lab result, we will notify you by phone as soon as possible.  Submit refill requests through [employer] or call your pharmacy and they will forward the refill request to us. Please allow 3 business days for your refill to be completed.          Additional Information About Your Visit        MyChart Information     [employer] gives you secure access to your electronic health record. If you see a primary care provider, you can also send messages to your care team and make appointments. If you have questions, please call your primary care clinic.  If you do not have a primary care provider, please call [phone] and they will assist you.        Care EveryWhere ID     This is your Care EveryWhere ID. This could be used by other organizations to access your [facility] medical records  [id number]         Blood Pressure from Last 3 Encounters:   08/11/17 120/78   07/05/17 112/70   06/06/16 110/78    Weight from Last 3 Encounters:   08/31/17 158 lb (71.7 kg)   08/11/17 158 lb (71.7 kg)   07/05/17 157 lb (71.2 kg)              Today, you had the  following     No orders found for display       Primary Care Provider Office Phone # Fax #    Jessicaamberly Cheema PA-C [phone]       [address]  [facility] [address]        Equal Access to Services     LUKE LORENZ : Hadii rehana pendleton tuckero Soeltonali, waaxda luqadaha, qaybta kaalmada ademelitonyada, fanta aranan devin powers colt marquez. So [facility] [phone].    ATENCIÓN: Si habla español, tiene a rivas disposición servicios gratuitos de asistencia lingüística. Llame al [phone].    We comply with applicable federal civil rights laws and [place] laws. We do not discriminate on the basis of race, color, national origin, age, disability, sex, sexual orientation, or gender identity.            Thank you!     Thank you for choosing [facility] PRIMARY CARE SKIN  for your care. Our goal is always to provide you with excellent care. Hearing back from our patients is one way we can continue to improve our services. Please take a few minutes to complete the written survey that you may receive in the mail after your visit with us. Thank you!             Your Updated Medication List - Protect others around you: Learn how to safely use, store and throw away your medicines at www.disposemymeds.org.          This list is accurate as of: 10/27/17 10:13 AM.  Always use your most recent med list.                   Brand Name Dispense Instructions for use Diagnosis    acyclovir 400 MG tablet    ZOVIRAX    30 tablet    Take 1 tablet (400 mg) by mouth 3 times daily    Herpes simplex infection of perianal skin       aspirin 81 MG EC tablet     90 tablet    Take 1 tablet (81 mg) by mouth daily        atenolol 25 MG tablet    TENORMIN    90 tablet    TAKE 1 TABLET(25 MG) BY MOUTH DAILY    Hypertension goal BP (blood pressure) < 140/90, Routine general medical examination at a health care facility       cholecalciferol 1000 UNIT tablet    vitamin D3    100 tablet    Take 2 tablets (2,000 Units) by  mouth daily    Encounter for vitamin deficiency screening       clobetasol 0.05 % ointment    TEMOVATE    15 g    Apply sparingly to affected area bid for 1 week, qd for 2 weeks  Do not apply to face.    Pruritus of genitalia       fluticasone 50 MCG/ACT spray    FLONASE    16 g    Spray 2 sprays into both nostrils daily    Throat pain       ketoconazole 2 % cream    NIZORAL    30 g    Apply topically 2 times daily    Seborrheic dermatitis       lovastatin 20 MG tablet    MEVACOR    90 tablet    Take 1 tablet (20 mg) by mouth At Bedtime    Hyperlipidemia, unspecified hyperlipidemia type, Routine general medical examination at a health care facility

## 2017-10-29 ENCOUNTER — MYC MEDICAL ADVICE (OUTPATIENT)
Dept: FAMILY MEDICINE | Facility: CLINIC | Age: 72
End: 2017-10-29

## 2017-10-30 ENCOUNTER — TELEPHONE (OUTPATIENT)
Dept: FAMILY MEDICINE | Facility: CLINIC | Age: 72
End: 2017-10-30

## 2017-10-30 NOTE — TELEPHONE ENCOUNTER
Explained to apply the premarin vaginal cream just to the outside tissue, she does not need to use the applicator.

## 2017-10-31 ENCOUNTER — TELEPHONE (OUTPATIENT)
Dept: FAMILY MEDICINE | Facility: CLINIC | Age: 72
End: 2017-10-31

## 2017-10-31 NOTE — PROGRESS NOTES
ADDENDUM:                                                    October 31, 2017 3:36 PM  Pathology report results:

## 2017-10-31 NOTE — TELEPHONE ENCOUNTER
Encounter : phonecall  Discussed lab results :       Pathology report : no residual basal cell carcinoma , margins clear      Recommendations :  Recheck: 3 months and yearly skin exams.

## 2018-01-12 ENCOUNTER — TRANSFERRED RECORDS (OUTPATIENT)
Dept: HEALTH INFORMATION MANAGEMENT | Facility: CLINIC | Age: 73
End: 2018-01-12

## 2018-03-29 ENCOUNTER — RADIANT APPOINTMENT (OUTPATIENT)
Dept: GENERAL RADIOLOGY | Facility: CLINIC | Age: 73
End: 2018-03-29
Attending: PODIATRIST
Payer: COMMERCIAL

## 2018-03-29 ENCOUNTER — OFFICE VISIT (OUTPATIENT)
Dept: PODIATRY | Facility: CLINIC | Age: 73
End: 2018-03-29
Payer: COMMERCIAL

## 2018-03-29 VITALS — WEIGHT: 158 LBS | BODY MASS INDEX: 26.33 KG/M2 | HEART RATE: 60 BPM | HEIGHT: 65 IN

## 2018-03-29 DIAGNOSIS — M79.672 LEFT FOOT PAIN: Primary | ICD-10-CM

## 2018-03-29 DIAGNOSIS — M79.671 RIGHT FOOT PAIN: ICD-10-CM

## 2018-03-29 DIAGNOSIS — M20.12 HALLUX ABDUCTO VALGUS, LEFT: ICD-10-CM

## 2018-03-29 DIAGNOSIS — M76.822 POSTERIOR TIBIAL TENDINITIS OF LEFT LOWER EXTREMITY: ICD-10-CM

## 2018-03-29 PROCEDURE — 99213 OFFICE O/P EST LOW 20 MIN: CPT | Performed by: PODIATRIST

## 2018-03-29 PROCEDURE — 73630 X-RAY EXAM OF FOOT: CPT | Mod: LT

## 2018-03-29 NOTE — PATIENT INSTRUCTIONS
Posterior tibial tendonitis;   [email or web address]    TENDONITIS   Tendons are the strong fibrous portions ofmuscles that attach to bones and allow the muscle to move a joint when it contracts. Tendons are very strong because they have a lot of force exerted on them. Sometimes tendons can become painful because they have suffered an acute injury, in which too much force was exerted at one time, or an overuse injury, in which a normal force was exerted too frequently or over a prolonged period of time. As a result, there is damage to the tendon and its surrounding soft tissue structures and they become inflammed. Because tendons do not have a great blood supply, they do not heal rapidly and the inflammation can become chronic.   Conservative treatment for tendinitis involves rest and anti-inflammatory measures. Ice is applied 15 minutes 2-3 times daily. Anti-inflammatory medications called NSAIDs (ibuprofen, example) can be taken provided they are used with caution, as they can lead to internal bleeding and increase the risk ofstroke and heart attack. Sometimes topical nitroglycerin is prescribed to help with pain. Often your doctor will use a special shoe or removable walking cast to immobilize the tendon, allowing it to heal without further damage from use. These devices are very useful in helping tendons heal, but they may slow you down or make you feel like your hip, knee, or back are out ofalignment. This is temporary and should go away once you are out ofthe immobilization. You should not use a walking cast when showering or driving. Another option is Platelet Rich Plasma injections. (Normally done with a Sports and Orthorapedic doctor.   If conservative measures fail, your physician may need to surgically repair the tendon by removing any chronic inflammatory tissue and sewing it back together. Sometimes it is sewn to an adjacent tendon with similar function for support and sometimes it is lengthened. .  Sometimes the bones around the tendon need to be realigned or reshaped to better support the tendon or prevent further damage. Your foot and ankle surgeon will discuss the specifics of your surgery with you, should you need it.    Towel stretch: Sit on a hard surface with your injured leg stretched out in front of you. Loop a towel around your toes and the ball of your foot and pull the towel toward your body keeping your leg straight. Hold this position for 15 to 30 seconds and then relax. Repeat 3 times. Then push the towel away with the ball of your foot. Repeat 3 times.  When you don't feel much of a stretch using the towel, you can start the standing calf stretch and the following exercises.  Standing calf stretch: Stand facing a wall with your hands on the wall at about eye level. Keep your injured leg back with your heel on the floor. Keep the other leg forward with the knee bent. Turn your back foot slightly inward (as if you were pigeon-toed). Slowly lean into the wall until you feel a stretch in the back of your calf. Hold the stretch for 15 to 30 seconds. Return to the starting position. Repeat 3 times. Do this exercise several times each day.   Standing soleus stretch: Stand facing a wall with your hands on the wall at about chest height. Keep your injured leg back with your heel on the floor. Keep the other leg forward with the knee bent. Turn your back foot slightly inward (as if you were pigeon-toed). Bend your back knee slightly and gently lean into the wall until you feel a stretch in the lower calf of your injured leg. Hold the stretch for 15 to 30 seconds. Return to the starting position. Repeat 3 times.   Achilles stretch: Stand with the ball of one foot on a stair. Reach for the step below with your heel until you feel a stretch in the arch of your foot. Hold this position for 15 to 30 seconds and then relax. Repeat 3 times.   Heel raise: Balance yourself while standing behind a chair or  counter. Using the chair or counter as a support to help you, raise your body up onto your toes and hold for 5 seconds. Then slowly lower yourself down without holding onto the support. (It's OK to keep holding onto the support if you need to.) When this exercise becomes less painful, try lowering yourself down on the injured leg only. Repeat 15 times. Do 2 sets of 15. Rest 30 seconds between sets.   Step-up: Stand with the foot of your injured leg on a support 3 to 5 inches high (like a small step or block of wood). Keep your other foot flat on the floor. Shift your weight onto the injured leg on the support. Straighten your injured leg as the other leg comes off the floor. Return to the starting position by bending your injured leg and slowly lowering your uninjured leg back to the floor. Do 2 sets of 15.   Resisted ankle eversion: Sit with both legs stretched out in front of you, with your feet about a shoulder's width apart. Tie a loop in one end of elastic tubing. Put the foot of your injured leg through the loop so that the tubing goes around the arch of that foot and wraps around the outside of the other foot. Hold onto the other end of the tubing with your hand to provide tension. Turn the foot of your injured leg up and out. Make sure you keep your other foot still so that it will allow the tubing to stretch as you move the foot of your injured leg. Return to the starting position. Do 2 sets of 15.   Balance and reach exercises: Stand next to a chair with your injured leg farther from the chair. The chair will provide support if you need it. Stand on the foot of your injured leg and bend your knee slightly. Try to raise the arch of this foot while keeping your big toe on the floor. Keep your foot in this position. With the hand that is farther away from the chair, reach forward in front of you by bending at the waist. Avoid bending your knee any more as you do this. Repeat this 10 times. To make the  exercise more challenging, reach farther in front of you. Do 2 sets of 10.  the same position as above. While keeping your arch height, reach the hand that is farther away from the chair across your body toward the chair. The farther you reach, the more challenging the exercise. Do 2 sets of 10.     Resisted ankle eversion: Sit with both legs stretched out in front of you, with your feet about a shoulder's width apart. Tie a loop in one end of elastic tubing. Put the foot of your injured leg through the loop so that the tubing goes around the arch of that foot and wraps around the outside of the other foot. Hold onto the other end of the tubing with your hand to provide tension. Turn the foot of your injured leg up and out. Make sure you keep your other foot still so that it will allow the tubing to stretch as you move the foot of your injured leg. Return to the starting position. Do 2 sets of 15.   If you have access to a wobble board, do the following exercises:  Wobble board exercises:   Stand on a wobble board with your feet shoulder width apart. Rock the board forwards and backwards 30 times, then side to side 30 times. Hold on to a chair if you need support.   Rotate the wobble board around so that the edge of the board is in contact with the floor at all times. Do this 30 times in a clockwise and then a counterclockwise direction.   Balance on the wobble board for as long as you can without letting the edges touch the floor. Try to do this for 2 minutes without touching the floor.   Rotate the wobble board in clockwise and counterclockwise circles, but do not let the edge of the board touch the floor.   When you have mastered exercises A through D, try repeating them while standing on just your injured leg.   After you are able to do these exercises on one leg, try to do them with your eyes closed. Make sure you have something nearby to support you in case you lose your balance.  FELI DOWD  LOCATIONS  [place]  [address] [facility]  [phone]   [address]  [phone] [place]  [address] [facility]  [phone]   [place]  [address]  [phone]   [place]  [address]  [phone] [place] [address]  [phone]   [place]  [address]  [phone]   [place]  [address] [place] [address]  [phone] [place]  [address]  [phone]

## 2018-03-29 NOTE — LETTER
"    3/29/2018         RE: Lidia Jenkins  [address] [place], [place] [id number]        Dear Colleague,    Thank you for referring your patient, Lidia Jenkins, to the [facility]. Please see a copy of my visit note below.    ASSESSMENT/PLAN:    Encounter Diagnoses   Name Primary?     Left foot pain Yes     Right foot pain      Hallux abducto valgus, left      Posterior tibial tendinitis of left lower extremity      We reviewed the x-rays together.  I discussed the relevant anatomy and the function of the posterior tibial tendon.   I addressed all of her questions.     Quality over the counter arch supports were discussed.  Appropriate shoes were discussed.  Avoidance of barefoot walking  Ice, NSIADs    If pain persists or worsens, I explained that bracing might be needed.  Physical therapy would also be recommended.      Follow up as needed.    Body mass index is 26.29 kg/(m^2).      Lit Blanco DPM, FACFAS, MS    [place] [facility] Podiatry/Foot & Ankle Surgery      ____________________________________________________________________    HPI:       .     Chief Complaint: bilateral foot pain, L > R.  Pain when walking for exercise.  Arch pain.  \"Sometimes it makes me not want to walk.\"  Onset of problem: 6 months  Pain/ discomfort is described as:  Deep ache  Ratin/10   Frequency:  Intermittent - more so when walking for exercise, not with other walking    Previous treatment: over the counter arch support, tried different shoes.  No relief.   *  Past Medical History:   Diagnosis Date     Arthritis      Basal cell carcinoma      Hyperlipidaemia LDL goal < 100      Hypertension goal BP (blood pressure) < 140/90      Psoriasis    *  *  Past Surgical History:   Procedure Laterality Date     ABDOMEN SURGERY       COLONOSCOPY     *  *  Current Outpatient Prescriptions   Medication Sig Dispense Refill     atenolol (TENORMIN) 25 MG tablet TAKE 1 TABLET(25 MG) BY MOUTH DAILY 90 tablet 1 " "    lovastatin (MEVACOR) 20 MG tablet Take 1 tablet (20 mg) by mouth At Bedtime 90 tablet 3     aspirin 81 MG EC tablet Take 1 tablet (81 mg) by mouth daily 90 tablet 3     cholecalciferol (VITAMIN D) 1000 UNIT tablet Take 2 tablets (2,000 Units) by mouth daily 100 tablet 3     acyclovir (ZOVIRAX) 400 MG tablet Take 1 tablet (400 mg) by mouth 3 times daily (Patient not taking: Reported on 3/29/2018) 30 tablet 0       ROS:    A 10-point review of systems was performed.  It is positive for that noted in the HPI and as seen below.  All other systems found to be negative.     Numbness in feet?  no   Calf pain with walking? no  Recent foot/ankle injury? no  Weight change  over past 12 months? 25# loss  Self perception as overweight? yes  Recent flu-like symptoms? no  Joint pain other than feet ? no    EXAM:    Vitals: Pulse 60  Ht 5' 5\" (1.651 m)  Wt 158 lb (71.7 kg)  BMI 26.29 kg/m2  BMI: Body mass index is 26.29 kg/(m^2).  Height: 5' 5\"    Constitutional/ general:  Pt is in no apparent distress, appears well-nourished.  Cooperative with history and physical exam.     Vascular:  Pedal pulses are palpable bilaterally for both the DP and PT arteries.  CFT < 3 sec.  No edema.  Pedal hair growth noted.     Neuro:  Alert and oriented x 3. Coordinated gait.  Light touch sensation is intact to the L4, L5, S1 distributions. No obvious deficits.  No evidence of neurological-based weakness, spasticity, or contracture in the lower extremities.     Derm: Normal texture and turgor.  No erythema, ecchymosis, or cyanosis.  No open lesions.     Musculoskeletal:    Lower extremity muscle strength is normal.  Patient is ambulatory without an assistive device or brace . Pes planus. Hallux abducto valgus deformity, left foot.  Reducible in the transverse plane with preserved sagittal plane ROM.  No crepitus.  Pain on palpation: near the insertion of the left posterior tibial tendon and with inversion of the left foot against resistance. "  No pain reproduced on the right.        Radiographic Exam:  X-Ray Findings:  I personally reviewed the left foot films.  Subtle calcinosis near the navicular tuberosity.       Lit Blanco DPM, FACFILI, MS    [place] [facility] Podiatry/Foot & Ankle Surgery              Again, thank you for allowing me to participate in the care of your patient.        Sincerely,        Lit Blanco DPM

## 2018-03-29 NOTE — PROGRESS NOTES
"ASSESSMENT/PLAN:    Encounter Diagnoses   Name Primary?     Left foot pain Yes     Right foot pain      Hallux abducto valgus, left      Posterior tibial tendinitis of left lower extremity      We reviewed the x-rays together.  I discussed the relevant anatomy and the function of the posterior tibial tendon.   I addressed all of her questions.     Quality over the counter arch supports were discussed.  Appropriate shoes were discussed.  Avoidance of barefoot walking  Ice, NSIADs    If pain persists or worsens, I explained that bracing might be needed.  Physical therapy would also be recommended.      Follow up as needed.    Body mass index is 26.29 kg/(m^2).      Lit Blanco DPM, FACFAS, MS    [place] [facility] Pod[facility]ry/Foot & Ankle Surgery      ____________________________________________________________________    HPI:       .     Chief Complaint: bilateral foot pain, L > R.  Pain when walking for exercise.  Arch pain.  \"Sometimes it makes me not want to walk.\"  Onset of problem: 6 months  Pain/ discomfort is described as:  Deep ache  Ratin/10   Frequency:  Intermittent - more so when walking for exercise, not with other walking    Previous treatment: over the counter arch support, tried different shoes.  No relief.   *  Past Medical History:   Diagnosis Date     Arthritis      Basal cell carcinoma      Hyperlipidaemia LDL goal < 100      Hypertension goal BP (blood pressure) < 140/90      Psoriasis    *  *  Past Surgical History:   Procedure Laterality Date     ABDOMEN SURGERY  1987   *  *  Current Outpatient Prescriptions   Medication Sig Dispense Refill     atenolol (TENORMIN) 25 MG tablet TAKE 1 TABLET(25 MG) BY MOUTH DAILY 90 tablet 1     lovastatin (MEVACOR) 20 MG tablet Take 1 tablet (20 mg) by mouth At Bedtime 90 tablet 3     aspirin 81 MG EC tablet Take 1 tablet (81 mg) by mouth daily 90 tablet 3     cholecalciferol (VITAMIN D) 1000 UNIT tablet Take 2 tablets (2,000 " "Units) by mouth daily 100 tablet 3     acyclovir (ZOVIRAX) 400 MG tablet Take 1 tablet (400 mg) by mouth 3 times daily (Patient not taking: Reported on 3/29/2018) 30 tablet 0       ROS:    A 10-point review of systems was performed.  It is positive for that noted in the HPI and as seen below.  All other systems found to be negative.     Numbness in feet?  no   Calf pain with walking? no  Recent foot/ankle injury? no  Weight change  over past 12 months? 25# loss  Self perception as overweight? yes  Recent flu-like symptoms? no  Joint pain other than feet ? no    EXAM:    Vitals: Pulse 60  Ht 5' 5\" (1.651 m)  Wt 158 lb (71.7 kg)  BMI 26.29 kg/m2  BMI: Body mass index is 26.29 kg/(m^2).  Height: 5' 5\"    Constitutional/ general:  Pt is in no apparent distress, appears well-nourished.  Cooperative with history and physical exam.     Vascular:  Pedal pulses are palpable bilaterally for both the DP and PT arteries.  CFT < 3 sec.  No edema.  Pedal hair growth noted.     Neuro:  Alert and oriented x 3. Coordinated gait.  Light touch sensation is intact to the L4, L5, S1 distributions. No obvious deficits.  No evidence of neurological-based weakness, spasticity, or contracture in the lower extremities.     Derm: Normal texture and turgor.  No erythema, ecchymosis, or cyanosis.  No open lesions.     Musculoskeletal:    Lower extremity muscle strength is normal.  Patient is ambulatory without an assistive device or brace . Pes planus. Hallux abducto valgus deformity, left foot.  Reducible in the transverse plane with preserved sagittal plane ROM.  No crepitus.  Pain on palpation: near the insertion of the left posterior tibial tendon and with inversion of the left foot against resistance.  No pain reproduced on the right.        Radiographic Exam:  X-Ray Findings:  I personally reviewed the left foot films.  Subtle calcinosis near the navicular tuberosity.       Lit Blanco DPM, FACFAS, MS    [place] Department of " Podiatry/Foot & Ankle Surgery

## 2018-03-29 NOTE — MR AVS SNAPSHOT
After Visit Summary   3/29/2018    Lidia Jenkins    MRN: [id number]           Patient Information     Date Of Birth          1945        Visit Information        Provider Department      3/29/2018 8:00 AM Lit Blanco DPM [facility]        Today's Diagnoses     Left foot pain    -  1    Right foot pain        Hallux abducto valgus, left        Posterior tibial tendinitis of left lower extremity          Care Instructions      Posterior tibial tendonitis;   [email or web address]    TENDONITIS   Tendons are the strong fibrous portions ofmuscles that attach to bones and allow the muscle to move a joint when it contracts. Tendons are very strong because they have a lot of force exerted on them. Sometimes tendons can become painful because they have suffered an acute injury, in which too much force was exerted at one time, or an overuse injury, in which a normal force was exerted too frequently or over a prolonged period of time. As a result, there is damage to the tendon and its surrounding soft tissue structures and they become inflammed. Because tendons do not have a great blood supply, they do not heal rapidly and the inflammation can become chronic.   Conservative treatment for tendinitis involves rest and anti-inflammatory measures. Ice is applied 15 minutes 2-3 times daily. Anti-inflammatory medications called NSAIDs (ibuprofen, example) can be taken provided they are used with caution, as they can lead to internal bleeding and increase the risk ofstroke and heart attack. Sometimes topical nitroglycerin is prescribed to help with pain. Often your doctor will use a special shoe or removable walking cast to immobilize the tendon, allowing it to heal without further damage from use. These devices are very useful in helping tendons heal, but they may slow you down or make you feel like your hip, knee, or back are out ofalignment. This is temporary and should go away once you are  out ofthe immobilization. You should not use a walking cast when showering or driving. Another option is Platelet Rich Plasma injections. (Normally done with a Sports and Orthorapedic doctor.   If conservative measures fail, your physician may need to surgically repair the tendon by removing any chronic inflammatory tissue and sewing it back together. Sometimes it is sewn to an adjacent tendon with similar function for support and sometimes it is lengthened. . Sometimes the bones around the tendon need to be realigned or reshaped to better support the tendon or prevent further damage. Your foot and ankle surgeon will discuss the specifics of your surgery with you, should you need it.    Towel stretch: Sit on a hard surface with your injured leg stretched out in front of you. Loop a towel around your toes and the ball of your foot and pull the towel toward your body keeping your leg straight. Hold this position for 15 to 30 seconds and then relax. Repeat 3 times. Then push the towel away with the ball of your foot. Repeat 3 times.  When you don't feel much of a stretch using the towel, you can start the standing calf stretch and the following exercises.  Standing calf stretch: Stand facing a wall with your hands on the wall at about eye level. Keep your injured leg back with your heel on the floor. Keep the other leg forward with the knee bent. Turn your back foot slightly inward (as if you were pigeon-toed). Slowly lean into the wall until you feel a stretch in the back of your calf. Hold the stretch for 15 to 30 seconds. Return to the starting position. Repeat 3 times. Do this exercise several times each day.   Standing soleus stretch: Stand facing a wall with your hands on the wall at about chest height. Keep your injured leg back with your heel on the floor. Keep the other leg forward with the knee bent. Turn your back foot slightly inward (as if you were pigeon-toed). Bend your back knee slightly and gently lean  into the wall until you feel a stretch in the lower calf of your injured leg. Hold the stretch for 15 to 30 seconds. Return to the starting position. Repeat 3 times.   Achilles stretch: Stand with the ball of one foot on a stair. Reach for the step below with your heel until you feel a stretch in the arch of your foot. Hold this position for 15 to 30 seconds and then relax. Repeat 3 times.   Heel raise: Balance yourself while standing behind a chair or counter. Using the chair or counter as a support to help you, raise your body up onto your toes and hold for 5 seconds. Then slowly lower yourself down without holding onto the support. (It's OK to keep holding onto the support if you need to.) When this exercise becomes less painful, try lowering yourself down on the injured leg only. Repeat 15 times. Do 2 sets of 15. Rest 30 seconds between sets.   Step-up: Stand with the foot of your injured leg on a support 3 to 5 inches high (like a small step or block of wood). Keep your other foot flat on the floor. Shift your weight onto the injured leg on the support. Straighten your injured leg as the other leg comes off the floor. Return to the starting position by bending your injured leg and slowly lowering your uninjured leg back to the floor. Do 2 sets of 15.   Resisted ankle eversion: Sit with both legs stretched out in front of you, with your feet about a shoulder's width apart. Tie a loop in one end of elastic tubing. Put the foot of your injured leg through the loop so that the tubing goes around the arch of that foot and wraps around the outside of the other foot. Hold onto the other end of the tubing with your hand to provide tension. Turn the foot of your injured leg up and out. Make sure you keep your other foot still so that it will allow the tubing to stretch as you move the foot of your injured leg. Return to the starting position. Do 2 sets of 15.   Balance and reach exercises: Stand next to a chair with  your injured leg farther from the chair. The chair will provide support if you need it. Stand on the foot of your injured leg and bend your knee slightly. Try to raise the arch of this foot while keeping your big toe on the floor. Keep your foot in this position. With the hand that is farther away from the chair, reach forward in front of you by bending at the waist. Avoid bending your knee any more as you do this. Repeat this 10 times. To make the exercise more challenging, reach farther in front of you. Do 2 sets of 10.  the same position as above. While keeping your arch height, reach the hand that is farther away from the chair across your body toward the chair. The farther you reach, the more challenging the exercise. Do 2 sets of 10.     Resisted ankle eversion: Sit with both legs stretched out in front of you, with your feet about a shoulder's width apart. Tie a loop in one end of elastic tubing. Put the foot of your injured leg through the loop so that the tubing goes around the arch of that foot and wraps around the outside of the other foot. Hold onto the other end of the tubing with your hand to provide tension. Turn the foot of your injured leg up and out. Make sure you keep your other foot still so that it will allow the tubing to stretch as you move the foot of your injured leg. Return to the starting position. Do 2 sets of 15.   If you have access to a wobble board, do the following exercises:  Wobble board exercises:   Stand on a wobble board with your feet shoulder width apart. Rock the board forwards and backwards 30 times, then side to side 30 times. Hold on to a chair if you need support.   Rotate the wobble board around so that the edge of the board is in contact with the floor at all times. Do this 30 times in a clockwise and then a counterclockwise direction.   Balance on the wobble board for as long as you can without letting the edges touch the floor. Try to do this for 2 minutes  without touching the floor.   Rotate the wobble board in clockwise and counterclockwise circles, but do not let the edge of the board touch the floor.   When you have mastered exercises A through D, try repeating them while standing on just your injured leg.   After you are able to do these exercises on one leg, try to do them with your eyes closed. Make sure you have something nearby to support you in case you lose your balance.  [facility] LOCATIONS  [place]  [address]  [phone]   [place] [address]  [phone] [place]  [address]  [phone]   [place]  [address]  [phone]   [place]  [address]  [phone] [place]  [address] [place] [address] [place]  [phone]   [place]  [address]  [phone]   [place]  [address] [place] [address]  [phone] [place]  [address] [phone]                   Follow-ups after your visit        Who to contact     If you have questions or need follow up information about today's clinic visit or your schedule please contact [facility] directly at [phone].  Normal or non-critical lab and imaging results will be communicated to you by Fastmobilehart, letter or phone within 4 business days after the clinic has received the results. If you do not hear from us within 7 days, please contact the clinic through MyChart or phone. If you have a critical or abnormal lab result, we will notify you by phone as soon as possible.  Submit refill requests through [email or web address] or call your pharmacy and they will forward the refill request to us. Please allow 3 business days for your refill to be completed.          Additional Information About Your Visit        [email or web address] Information     [email or web address] gives you secure access to your electronic health record. If you see a primary care provider, you can also send messages to your care team and make appointments. If you have questions, please call your  "primary care clinic.  If you do not have a primary care provider, please call [phone] and they will assist you.        Care EveryWhere ID     This is your Care EveryWhere ID. This could be used by other organizations to access your [place] medical records  [id number]        Your Vitals Were     Pulse Height BMI (Body Mass Index)             60 5' 5\" (1.651 m) 26.29 kg/m2          Blood Pressure from Last 3 Encounters:   08/11/17 120/78   07/05/17 112/70   06/06/16 110/78    Weight from Last 3 Encounters:   03/29/18 158 lb (71.7 kg)   08/31/17 158 lb (71.7 kg)   08/11/17 158 lb (71.7 kg)              We Performed the Following     XR Foot Left G/E 3 Views        Primary Care Provider Office Phone # Fax #    Jessiac Cheema PA-C [phone]       [address]  [facility] [address]        Equal Access to Services     LUKE LORENZ : Hadii aad ku hadasho Soomaali, waaxda luqadaha, qaybta kaalmada adeegyada, waxay idiin hayvamshin devin gregory . So [facility] [phone].    ATENCIÓN: Si habla español, tiene a rivas disposición servicios gratuitos de asistencia lingüística. [facility] [phone].    We comply with applicable federal civil rights laws and [place] laws. We do not discriminate on the basis of race, color, national origin, age, disability, sex, sexual orientation, or gender identity.            Thank you!     Thank you for choosing [facility]  for your care. Our goal is always to provide you with excellent care. Hearing back from our patients is one way we can continue to improve our services. Please take a few minutes to complete the written survey that you may receive in the mail after your visit with us. Thank you!             Your Updated Medication List - Protect others around you: Learn how to safely use, store and throw away your medicines at www.disposemymeds.org.          This list is accurate as of 3/29/18  9:11 AM.  Always use your most " recent med list.                   Brand Name Dispense Instructions for use Diagnosis    acyclovir 400 MG tablet    ZOVIRAX    30 tablet    Take 1 tablet (400 mg) by mouth 3 times daily    Herpes simplex infection of perianal skin       aspirin 81 MG EC tablet     90 tablet    Take 1 tablet (81 mg) by mouth daily        atenolol 25 MG tablet    TENORMIN    90 tablet    TAKE 1 TABLET(25 MG) BY MOUTH DAILY    Hypertension goal BP (blood pressure) < 140/90, Routine general medical examination at a health care facility       cholecalciferol 1000 UNIT tablet    vitamin D3    100 tablet    Take 2 tablets (2,000 Units) by mouth daily    Encounter for vitamin deficiency screening       lovastatin 20 MG tablet    MEVACOR    90 tablet    Take 1 tablet (20 mg) by mouth At Bedtime    Hyperlipidemia, unspecified hyperlipidemia type, Routine general medical examination at a health care facility

## 2018-06-04 ASSESSMENT — ACTIVITIES OF DAILY LIVING (ADL)
I_NEED_ASSISTANCE_FOR_THE_FOLLOWING_DAILY_ACTIVITIES:: NO ASSISTANCE IS NEEDED
CURRENT_FUNCTION: NO ASSISTANCE NEEDED

## 2018-06-07 ENCOUNTER — OFFICE VISIT (OUTPATIENT)
Dept: FAMILY MEDICINE | Facility: CLINIC | Age: 73
End: 2018-06-07
Payer: COMMERCIAL

## 2018-06-07 VITALS
BODY MASS INDEX: 26.63 KG/M2 | OXYGEN SATURATION: 97 % | SYSTOLIC BLOOD PRESSURE: 120 MMHG | RESPIRATION RATE: 15 BRPM | DIASTOLIC BLOOD PRESSURE: 72 MMHG | HEART RATE: 61 BPM | HEIGHT: 64 IN | WEIGHT: 156 LBS | TEMPERATURE: 97.7 F

## 2018-06-07 DIAGNOSIS — I10 HYPERTENSION GOAL BP (BLOOD PRESSURE) < 140/90: ICD-10-CM

## 2018-06-07 DIAGNOSIS — Z13.6 CARDIOVASCULAR SCREENING; LDL GOAL LESS THAN 160: ICD-10-CM

## 2018-06-07 DIAGNOSIS — Z00.00 ROUTINE GENERAL MEDICAL EXAMINATION AT A HEALTH CARE FACILITY: Primary | ICD-10-CM

## 2018-06-07 LAB
ALBUMIN SERPL-MCNC: 3.9 G/DL (ref 3.4–5)
ALP SERPL-CCNC: 52 U/L (ref 40–150)
ALT SERPL W P-5'-P-CCNC: 31 U/L (ref 0–50)
ANION GAP SERPL CALCULATED.3IONS-SCNC: 7 MMOL/L (ref 3–14)
AST SERPL W P-5'-P-CCNC: 27 U/L (ref 0–45)
BASOPHILS # BLD AUTO: 0 10E9/L (ref 0–0.2)
BASOPHILS NFR BLD AUTO: 0.7 %
BILIRUB SERPL-MCNC: 0.5 MG/DL (ref 0.2–1.3)
BUN SERPL-MCNC: 23 MG/DL (ref 7–30)
CALCIUM SERPL-MCNC: 9.1 MG/DL (ref 8.5–10.1)
CHLORIDE SERPL-SCNC: 110 MMOL/L (ref 94–109)
CHOLEST SERPL-MCNC: 185 MG/DL
CO2 SERPL-SCNC: 27 MMOL/L (ref 20–32)
CREAT SERPL-MCNC: 0.79 MG/DL (ref 0.52–1.04)
DIFFERENTIAL METHOD BLD: NORMAL
EOSINOPHIL # BLD AUTO: 0.2 10E9/L (ref 0–0.7)
EOSINOPHIL NFR BLD AUTO: 3.5 %
ERYTHROCYTE [DISTWIDTH] IN BLOOD BY AUTOMATED COUNT: 13.3 % (ref 10–15)
GFR SERPL CREATININE-BSD FRML MDRD: 72 ML/MIN/1.7M2
GLUCOSE SERPL-MCNC: 84 MG/DL (ref 70–99)
HCT VFR BLD AUTO: 41.6 % (ref 35–47)
HDLC SERPL-MCNC: 68 MG/DL
HGB BLD-MCNC: 13.7 G/DL (ref 11.7–15.7)
LDLC SERPL CALC-MCNC: 103 MG/DL
LYMPHOCYTES # BLD AUTO: 1.1 10E9/L (ref 0.8–5.3)
LYMPHOCYTES NFR BLD AUTO: 24 %
MCH RBC QN AUTO: 29.9 PG (ref 26.5–33)
MCHC RBC AUTO-ENTMCNC: 32.9 G/DL (ref 31.5–36.5)
MCV RBC AUTO: 91 FL (ref 78–100)
MONOCYTES # BLD AUTO: 0.5 10E9/L (ref 0–1.3)
MONOCYTES NFR BLD AUTO: 10.5 %
NEUTROPHILS # BLD AUTO: 2.8 10E9/L (ref 1.6–8.3)
NEUTROPHILS NFR BLD AUTO: 61.3 %
NONHDLC SERPL-MCNC: 117 MG/DL
PLATELET # BLD AUTO: 244 10E9/L (ref 150–450)
POTASSIUM SERPL-SCNC: 4.7 MMOL/L (ref 3.4–5.3)
PROT SERPL-MCNC: 7.3 G/DL (ref 6.8–8.8)
RBC # BLD AUTO: 4.58 10E12/L (ref 3.8–5.2)
SODIUM SERPL-SCNC: 144 MMOL/L (ref 133–144)
TRIGL SERPL-MCNC: 72 MG/DL
WBC # BLD AUTO: 4.6 10E9/L (ref 4–11)

## 2018-06-07 PROCEDURE — 85025 COMPLETE CBC W/AUTO DIFF WBC: CPT | Performed by: PHYSICIAN ASSISTANT

## 2018-06-07 PROCEDURE — 80061 LIPID PANEL: CPT | Performed by: PHYSICIAN ASSISTANT

## 2018-06-07 PROCEDURE — 36415 COLL VENOUS BLD VENIPUNCTURE: CPT | Performed by: PHYSICIAN ASSISTANT

## 2018-06-07 PROCEDURE — 99397 PER PM REEVAL EST PAT 65+ YR: CPT | Performed by: PHYSICIAN ASSISTANT

## 2018-06-07 PROCEDURE — 80053 COMPREHEN METABOLIC PANEL: CPT | Performed by: PHYSICIAN ASSISTANT

## 2018-06-07 ASSESSMENT — ACTIVITIES OF DAILY LIVING (ADL): CURRENT_FUNCTION: NO ASSISTANCE NEEDED

## 2018-06-07 NOTE — PROGRESS NOTES
SUBJECTIVE:   Lidia Jenkins is a 72 year old female who presents for Preventive Visit.  Are you in the first 12 months of your Medicare coverage?  No    Physical   Annual:     Getting at least 3 servings of Calcium per day::  NO    Bi-annual eye exam::  Yes    Dental care twice a year::  Yes    Sleep apnea or symptoms of sleep apnea::  None    Diet::  Regular (no restrictions)    Frequency of exercise::  4-5 days/week    Duration of exercise::  15-30 minutes    Taking medications regularly::  Yes    Additional concerns today::  YES    Ability to successfully perform activities of daily living: no assistance needed  Home Safety:  Lack of grab bars in the bathroom  Hearing Impairment: no hearing concerns      Fall risk:  Fallen 2 or more times in the past year?: No  Any fall with injury in the past year?: No    COGNITIVE SCREEN  1) Repeat 3 items (Banana, Sunrise, Chair)    2) Clock draw: NORMAL  3) 3 item recall: Recalls 3 objects  Results: 3 items recalled: COGNITIVE IMPAIRMENT LESS LIKELY    Mini-CogTM Copyright MAXIME Spencer. Licensed by the author for use in [facility]; reprinted with permission ([email or web address]). All rights reserved.        Reviewed and updated as needed this visit by clinical staff  Tobacco  Allergies  Meds  Problems  Med Hx  Surg Hx  Fam Hx  Soc Hx          Reviewed and updated as needed this visit by Provider  Allergies  Meds  Problems        Social History   Substance Use Topics     Smoking status: Never Smoker     Smokeless tobacco: Never Used     Alcohol use 2.4 oz/week      Comment: 1-4 glasses of wine a week       Alcohol Use 6/4/2018   If you drink alcohol do you typically have greater than 3 drinks per day OR greater than 7 drinks per week? No   No flowsheet data found.        Today's PHQ-2 Score:   PHQ-2 ( 1999 [employer]) 6/4/2018   Q1: Little interest or pleasure in doing things 0   Q2: Feeling down, depressed or hopeless 0   PHQ-2 Score 0   Q1: Little interest or  pleasure in doing things Not at all   Q2: Feeling down, depressed or hopeless Not at all   PHQ-2 Score 0       Do you feel safe in your environment - Yes    Do you have a Health Care Directive?: Yes: Advance Directive has been received and scanned.    Current providers sharing in care for this patient include:   Patient Care Team:  Jessica Cheema PA-C as PCP - General (Physician Assistant)  Ana Pantoja MD as MD (Family Medicine - Sports Medicine)    The following health maintenance items are reviewed in Epic and correct as of today:  Health Maintenance   Topic Date Due     MAMMO SCREEN Q2 YR (SYSTEM ASSIGNED)  10/21/2017     FALL RISK ASSESSMENT  07/05/2018     ADVANCE DIRECTIVE PLANNING Q5 YRS  06/27/2021     COLON CANCER SCREEN (SYSTEM ASSIGNED)  06/15/2022     LIPID SCREEN Q5 YR FEMALE (SYSTEM ASSIGNED)  07/05/2022     TETANUS IMMUNIZATION (SYSTEM ASSIGNED)  01/01/2023     DEXA SCAN SCREENING (SYSTEM ASSIGNED)  Completed     PNEUMOCOCCAL  Completed     INFLUENZA VACCINE  Completed     HEPATITIS C SCREENING  Completed     Labs reviewed in EPIC  BP Readings from Last 3 Encounters:   06/07/18 120/72   08/11/17 120/78   07/05/17 112/70    Wt Readings from Last 3 Encounters:   06/07/18 156 lb (70.8 kg)   03/29/18 158 lb (71.7 kg)   08/31/17 158 lb (71.7 kg)                  Patient Active Problem List   Diagnosis     Hypertension goal BP (blood pressure) < 140/90     History of basal cell carcinoma     Advance care planning     Past Surgical History:   Procedure Laterality Date     ABDOMEN SURGERY  1987     COLONOSCOPY  2012       Social History   Substance Use Topics     Smoking status: Never Smoker     Smokeless tobacco: Never Used     Alcohol use 2.4 oz/week      Comment: 1-4 glasses of wine a week     Family History   Problem Relation Age of Onset     Breast Cancer Mother      Hypertension Mother      Hyperlipidemia Mother      Depression Mother      Hypertension Brother       "Hyperlipidemia Brother      Hypertension Sister      Hyperlipidemia Sister      Coronary Artery Disease Father      MI     Hypertension Father      Hyperlipidemia Father      Substance Abuse Father      Coronary Artery Disease Brother      Hypertension Brother      Substance Abuse Brother      Hyperlipidemia Brother      Hypertension Sister      Hyperlipidemia Sister      Depression Sister      Substance Abuse Sister      Other Cancer No family hx of          Current Outpatient Prescriptions   Medication Sig Dispense Refill     aspirin 81 MG EC tablet Take 1 tablet (81 mg) by mouth daily 90 tablet 3     atenolol (TENORMIN) 25 MG tablet TAKE 1 TABLET(25 MG) BY MOUTH DAILY 90 tablet 1     cholecalciferol (VITAMIN D) 1000 UNIT tablet Take 2 tablets (2,000 Units) by mouth daily 100 tablet 3     lovastatin (MEVACOR) 20 MG tablet Take 1 tablet (20 mg) by mouth At Bedtime 90 tablet 3     No Known Allergies  Recent Labs   Lab Test  07/05/17   0851  06/06/16   0951  09/03/15   1001  12/29/14   LDL  109*  113*  97   --   117   HDL  66  68  69   --   60   TRIG  63  57  75   --   118   ALT  24  27  31   < >   --    CR  0.80  0.76  0.80   < >   --    GFRESTIMATED  70  75  71   --    --    GFRESTBLACK  85  >90   GFR Calc    86   --    --    POTASSIUM  4.0  3.9  4.2   < >   --    TSH  2.40   --    --    --   1.71    < > = values in this interval not displayed.        Mammogram Screening: Patient over age 50, mutual decision to screen reflected in health maintenance.    Review of Systems  Constitutional, HEENT, cardiovascular, pulmonary, GI, , musculoskeletal, neuro, skin, endocrine and psych systems are negative, except as otherwise noted.    OBJECTIVE:   /72 (BP Location: Left arm, Patient Position: Sitting, Cuff Size: Adult Regular)  Pulse 61  Temp 97.7  F (36.5  C) (Oral)  Resp 15  Ht 5' 4\" (1.626 m)  Wt 156 lb (70.8 kg)  SpO2 97%  BMI 26.78 kg/m2 Estimated body mass index is 26.78 kg/(m^2) as " "calculated from the following:    Height as of this encounter: 5' 4\" (1.626 m).    Weight as of this encounter: 156 lb (70.8 kg).  Physical Exam  GENERAL: healthy, alert and no distress  EYES: Eyes grossly normal to inspection, PERRL and conjunctivae and sclerae normal  HENT: ear canals and TM's normal, nose and mouth without ulcers or lesions  NECK: no adenopathy, no asymmetry, masses, or scars and thyroid normal to palpation  RESP: lungs clear to auscultation - no rales, rhonchi or wheezes  BREAST: normal without masses, tenderness or nipple discharge and no palpable axillary masses or adenopathy  CV: regular rate and rhythm, normal S1 S2, no S3 or S4, no murmur, click or rub, no peripheral edema and peripheral pulses strong  ABDOMEN: soft, nontender, no hepatosplenomegaly, no masses and bowel sounds normal  MS: no gross musculoskeletal defects noted, no edema  SKIN: no suspicious lesions or rashes  NEURO: Normal strength and tone, mentation intact and speech normal  PSYCH: mentation appears normal, affect normal/bright    ASSESSMENT / PLAN:       ICD-10-CM    1. Routine general medical examination at a health care facility Z00.00    2. Hypertension goal BP (blood pressure) < 140/90 I10 Comprehensive metabolic panel     CBC with platelets differential   3. CARDIOVASCULAR SCREENING; LDL GOAL LESS THAN 160 Z13.6 Lipid panel reflex to direct LDL Fasting       End of Life Planning:  Patient currently has an advanced directive: Yes.  Practitioner is supportive of decision.    COUNSELING:  Reviewed preventive health counseling, as reflected in patient instructions       Regular exercise       Healthy diet/nutrition       Vision screening      Estimated body mass index is 26.78 kg/(m^2) as calculated from the following:    Height as of this encounter: 5' 4\" (1.626 m).    Weight as of this encounter: 156 lb (70.8 kg).   reports that she has never smoked. She has never used smokeless tobacco.    Appropriate preventive " services were discussed with this patient, including applicable screening as appropriate for cardiovascular disease, diabetes, osteopenia/osteoporosis, and glaucoma.  As appropriate for age/gender, discussed screening for colorectal cancer, prostate cancer, breast cancer, and cervical cancer. Checklist reviewing preventive services available has been given to the patient.    Reviewed patients plan of care and provided an AVS. The Basic Care Plan (routine screening as documented in Health Maintenance) for Lidia meets the Care Plan requirement. This Care Plan has been established and reviewed with the Patient.    Counseling Resources:  ATP IV Guidelines  Pooled Cohorts Equation Calculator  Breast Cancer Risk Calculator  FRAX Risk Assessment  ICSI Preventive Guidelines  Dietary Guidelines for Americans, 2010  [place]'s MyPlate  ASA Prophylaxis  Lung CA Screening    Jessica Cheema PA-C  [facility]    Answers for HPI/ROS submitted by the patient on 6/4/2018   PHQ-2 Score: 0

## 2018-06-07 NOTE — MR AVS SNAPSHOT
After Visit Summary   6/7/2018    Lidia Jenkins    MRN: [id number]           Patient Information     Date Of Birth          1945        Visit Information        Provider Department      6/7/2018 8:20 AM Jessica Cheema PA-C [facility]        Today's Diagnoses     Routine general medical examination at a health care facility    -  1    Hypertension goal BP (blood pressure) < 140/90        CARDIOVASCULAR SCREENING; LDL GOAL LESS THAN 160          Care Instructions      Preventive Health Recommendations    Female Ages 65 +    Yearly exam:     See your health care provider every year in order to  o Review health changes.   o Discuss preventive care.    o Review your medicines if your doctor has prescribed any.      You no longer need a yearly Pap test unless you've had an abnormal Pap test in the past 10 years. If you have vaginal symptoms, such as bleeding or discharge, be sure to talk with your provider about a Pap test.      Every 1 to 2 years, have a mammogram.  If you are over 69, talk with your health care provider about whether or not you want to continue having screening mammograms.      Every 10 years, have a colonoscopy. Or, have a yearly FIT test (stool test). These exams will check for colon cancer.       Have a cholesterol test every 5 years, or more often if your doctor advises it.       Have a diabetes test (fasting glucose) every three years. If you are at risk for diabetes, you should have this test more often.       At age 65, have a bone density scan (DEXA) to check for osteoporosis (brittle bone disease).    Shots:    Get a flu shot each year.    Get a tetanus shot every 10 years.    Talk to your doctor about your pneumonia vaccines. There are now two you should receive - Pneumovax (PPSV 23) and Prevnar (PCV 13).    Talk to your doctor about the shingles vaccine.    Talk to your doctor about the hepatitis B vaccine.    Nutrition:     Eat at least 5  servings of fruits and vegetables each day.      Eat whole-grain bread, whole-wheat pasta and brown rice instead of white grains and rice.      Talk to your provider about Calcium and Vitamin D.     Lifestyle    Exercise at least 150 minutes a week (30 minutes a day, 5 days a week). This will help you control your weight and prevent disease.      Limit alcohol to one drink per day.      No smoking.       Wear sunscreen to prevent skin cancer.       See your dentist twice a year for an exam and cleaning.      See your eye doctor every 1 to 2 years to screen for conditions such as glaucoma, macular degeneration and cataracts.          Follow-ups after your visit        Follow-up notes from your care team     Return in about 1 year (around 6/7/2019), or if symptoms worsen or fail to improve.      Your next 10 appointments already scheduled     Jun 11, 2018 10:30 AM CDT   MA SCREENING DIGITAL BILATERAL with URBCMA1   [facility] ([facility])    [address] [place] [address], [address]  [facility] [address] [phone]           Do not use any powder, lotion or deodorant under your arms or on your breast. If you do, we will ask you to remove it before your exam.  Wear comfortable, two-piece clothing.  If you have any allergies, tell your care team.  Bring any previous mammograms from other facilities or have them mailed to the breast center.              Who to contact     If you have questions or need follow up information about today's clinic visit or your schedule please contact [facility] directly at [phone].  Normal or non-critical lab and imaging results will be communicated to you by MyChart, letter or phone within 4 business days after the clinic has received the results. If you do not hear from us within 7 days, please contact the clinic through MyChart or phone. If you have a critical or abnormal lab result, we will notify you by phone as soon as possible.  Submit refill  "requests through [employer] or call your pharmacy and they will forward the refill request to us. Please allow 3 business days for your refill to be completed.          Additional Information About Your Visit        [employer] Information     [employer] gives you secure access to your electronic health record. If you see a primary care provider, you can also send messages to your care team and make appointments. If you have questions, please call your primary care clinic.  If you do not have a primary care provider, please call [phone] and they will assist you.        Care EveryWhere ID     This is your Care EveryWhere ID. This could be used by other organizations to access your [facility] medical records  [id number]        Your Vitals Were     Pulse Temperature Respirations Height Pulse Oximetry BMI (Body Mass Index)    61 97.7  F (36.5  C) (Oral) 15 5' 4\" (1.626 m) 97% 26.78 kg/m2       Blood Pressure from Last 3 Encounters:   06/07/18 120/72   08/11/17 120/78   07/05/17 112/70    Weight from Last 3 Encounters:   06/07/18 156 lb (70.8 kg)   03/29/18 158 lb (71.7 kg)   08/31/17 158 lb (71.7 kg)              We Performed the Following     CBC with platelets differential     Comprehensive metabolic panel     Lipid panel reflex to direct LDL Fasting          Today's Medication Changes          These changes are accurate as of 6/7/18  8:48 AM.  If you have any questions, ask your nurse or doctor.               Stop taking these medicines if you haven't already. Please contact your care team if you have questions.     acyclovir 400 MG tablet   Commonly known as:  ZOVIRAX   Stopped by:  Jessica Cheema PA-C                    Primary Care Provider Office Phone # Fax #    Jessica Cheema PA-C [phone] [address]  [facility] [address]        Equal Access to Services     LUKE HOPKINS: Nanda Sandoval, amadouda rocael, qaybta mery donald, fanta arriaga " herminia lehmanlalitbita spearsWaqasbernice ah. So [facility] [phone].    ATENCIÓN: Si habla danny, tiene a rivas disposición servicios gratuitos de asistencia lingüística. Julio valles [phone].    We comply with applicable federal civil rights laws and [place] laws. We do not discriminate on the basis of race, color, national origin, age, disability, sex, sexual orientation, or gender identity.            Thank you!     Thank you for choosing [facility]  for your care. Our goal is always to provide you with excellent care. Hearing back from our patients is one way we can continue to improve our services. Please take a few minutes to complete the written survey that you may receive in the mail after your visit with us. Thank you!             Your Updated Medication List - Protect others around you: Learn how to safely use, store and throw away your medicines at www.disposemymeds.org.          This list is accurate as of 6/7/18  8:48 AM.  Always use your most recent med list.                   Brand Name Dispense Instructions for use Diagnosis    aspirin 81 MG EC tablet     90 tablet    Take 1 tablet (81 mg) by mouth daily        atenolol 25 MG tablet    TENORMIN    90 tablet    TAKE 1 TABLET(25 MG) BY MOUTH DAILY    Hypertension goal BP (blood pressure) < 140/90, Routine general medical examination at a health care facility       cholecalciferol 1000 UNIT tablet    vitamin D3    100 tablet    Take 2 tablets (2,000 Units) by mouth daily    Encounter for vitamin deficiency screening       lovastatin 20 MG tablet    MEVACOR    90 tablet    Take 1 tablet (20 mg) by mouth At Bedtime    Hyperlipidemia, unspecified hyperlipidemia type, Routine general medical examination at a health care facility

## 2018-06-07 NOTE — PROGRESS NOTES
"oCco Murillo  Your attached labs are normal/stable. Keep up the good work!  Please contact the office with any questions or concerns.    Jessica Hawley \"Taz\" JUAN ANTONIO Cheema  "

## 2018-06-11 ENCOUNTER — RADIANT APPOINTMENT (OUTPATIENT)
Dept: MAMMOGRAPHY | Facility: CLINIC | Age: 73
End: 2018-06-11
Attending: PHYSICIAN ASSISTANT
Payer: COMMERCIAL

## 2018-06-11 DIAGNOSIS — Z00.00 ROUTINE GENERAL MEDICAL EXAMINATION AT A HEALTH CARE FACILITY: ICD-10-CM

## 2018-06-11 DIAGNOSIS — Z12.31 ENCOUNTER FOR SCREENING MAMMOGRAM FOR BREAST CANCER: ICD-10-CM

## 2018-06-11 PROCEDURE — 77067 SCR MAMMO BI INCL CAD: CPT

## 2018-06-12 NOTE — PROGRESS NOTES
Coco Mckeon Gregory,  Your results came back and are within acceptable limits. -Mammogram was normal.  ADVISE: rechecking in 1 year.. If you have any further concerns please do not hesitate to contact us by message, phone or making an appointment.  Have a good day   Dr Loyd PICKARD

## 2018-08-03 DIAGNOSIS — Z00.00 ROUTINE GENERAL MEDICAL EXAMINATION AT A HEALTH CARE FACILITY: ICD-10-CM

## 2018-08-03 DIAGNOSIS — I10 HYPERTENSION GOAL BP (BLOOD PRESSURE) < 140/90: ICD-10-CM

## 2018-08-03 DIAGNOSIS — E78.5 HYPERLIPIDEMIA, UNSPECIFIED HYPERLIPIDEMIA TYPE: ICD-10-CM

## 2018-08-03 NOTE — TELEPHONE ENCOUNTER
"Requested Prescriptions   Pending Prescriptions Disp Refills     lovastatin (MEVACOR) 20 MG tablet [Pharmacy Med Name: LOVASTATIN 20MG TABLETS]  Last Written Prescription Date:  7/5/2017  Last Fill Quantity: 90 tabs,  # refills: 3   Last office visit: 6/7/2018 with prescribing provider:  Deni   Future Office Visit:     90 tablet 0     Sig: TAKE 1 TABLET BY MOUTH AT BEDTIME    Statins Protocol Passed    8/3/2018 10:25 AM       Passed - LDL on file in past 12 months    Recent Labs   Lab Test  06/07/18   0855   LDL  103*            Passed - No abnormal creatine kinase in past 12 months    No lab results found.            Passed - Recent (12 mo) or future (30 days) visit within the authorizing provider's specialty    Patient had office visit in the last 12 months or has a visit in the next 30 days with authorizing provider or within the authorizing provider's specialty.  See \"Patient Info\" tab in inbasket, or \"Choose Columns\" in Meds & Orders section of the refill encounter.           Passed - Patient is age 18 or older       Passed - No active pregnancy on record       Passed - No positive pregnancy test in past 12 months        atenolol (TENORMIN) 25 MG tablet [Pharmacy Med Name: ATENOLOL 25MG TABLETS]  Last Written Prescription Date:  7/5/2017  Last Fill Quantity: 90 tabs,  # refills: 1   Last office visit: 6/7/2018 with prescribing provider:  Deni   Future Office Visit:     90 tablet 0     Sig: TAKE 1 TABLET BY MOUTH DAILY    Beta-Blockers Protocol Passed    8/3/2018 10:25 AM       Passed - Blood pressure under 140/90 in past 12 months    BP Readings from Last 3 Encounters:   06/07/18 120/72   08/11/17 120/78   07/05/17 112/70                Passed - Patient is age 6 or older       Passed - Recent (12 mo) or future (30 days) visit within the authorizing provider's specialty    Patient had office visit in the last 12 months or has a visit in the next 30 days with authorizing provider or within the authorizing " "provider's specialty.  See \"Patient Info\" tab in inbasket, or \"Choose Columns\" in Meds & Orders section of the refill encounter.              "

## 2018-08-06 RX ORDER — LOVASTATIN 20 MG
TABLET ORAL
Qty: 90 TABLET | Refills: 0 | Status: SHIPPED | OUTPATIENT
Start: 2018-08-06 | End: 2018-11-17

## 2018-08-06 RX ORDER — ATENOLOL 25 MG/1
TABLET ORAL
Qty: 90 TABLET | Refills: 2 | Status: SHIPPED | OUTPATIENT
Start: 2018-08-06 | End: 2019-05-24

## 2018-08-06 NOTE — TELEPHONE ENCOUNTER
Appeared to be a gap in taking atenolol but I called patient and she says she takes daily.  Prescription approved per [facility].  Manju Mcfadden RN

## 2018-11-01 ENCOUNTER — OFFICE VISIT (OUTPATIENT)
Dept: FAMILY MEDICINE | Facility: CLINIC | Age: 73
End: 2018-11-01
Payer: COMMERCIAL

## 2018-11-01 VITALS
SYSTOLIC BLOOD PRESSURE: 122 MMHG | HEIGHT: 64 IN | BODY MASS INDEX: 26.78 KG/M2 | OXYGEN SATURATION: 100 % | DIASTOLIC BLOOD PRESSURE: 78 MMHG | HEART RATE: 66 BPM

## 2018-11-01 DIAGNOSIS — L82.1 SEBORRHEIC KERATOSES: ICD-10-CM

## 2018-11-01 DIAGNOSIS — Z85.828 HISTORY OF BASAL CELL CARCINOMA: Primary | ICD-10-CM

## 2018-11-01 DIAGNOSIS — L81.4 SOLAR LENTIGO: ICD-10-CM

## 2018-11-01 DIAGNOSIS — D22.9 MULTIPLE BENIGN NEVI: ICD-10-CM

## 2018-11-01 PROCEDURE — 99213 OFFICE O/P EST LOW 20 MIN: CPT | Performed by: FAMILY MEDICINE

## 2018-11-01 NOTE — MR AVS SNAPSHOT
"              After Visit Summary   11/1/2018    Lidia Jenkins    MRN: [id number]           Patient Information     Date Of Birth          1945        Visit Information        Provider Department      11/1/2018 10:00 AM Mary Linder MD [facility]        Today's Diagnoses     History of basal cell carcinoma    -  1    Solar lentigo        Multiple benign nevi        Seborrheic keratoses          Care Instructions    FUTURE APPOINTMENTS  Follow up in 1 year(s)    SUN PROTECTION INSTRUCTIONS  Sun damage can lead to skin cancer and premature aging of the skin.      The best way to protect from sun damage to your skin is to avoid the sun during peak hours (10 am - 2 pm) even on overcast days.      Use UPF sun-protective clothing, which while more expensive initially provides longer lasting coverage without having to worry about remembering to re-apply.  1. Wear a wide-brimmed hat and sunglasses.   2. Wear sun-protective clothing.  [employer] and other [employer] make sun protective clothing that are stylish, comfortable and cool. [employer] and other [employer] make UV arm sleeves suitable for golfing, gardening and other activities.      Sunscreen instructions:  1. Use sunscreens with Zinc Oxide, Titanium Dioxide or Avobenzone to protect from UVA rays.  2. Use SPF 30-50+ to protect from UVB rays.  3. Re-apply every 2 hours even if water resistant.  4. Apply on your face every day even when cloudy and even in the winter. UVA \"aging rays\" penetrate window glass and are just as strong in the winter as in the summer.    Product Recommendations:    Good examples include: Peter Garcia, EltaMD, Solbar    Good daily moisturizers with SPF: Vanicream, CeraVe.    For sensitive skin, consider : SkinMedica Essential Defense Mineral Shield Broad Spectrum SPF 35    Men: consider use of Neutrogena Triple Protect Facial Lotion      Never use tanning beds. Tanning beds are associated with " "much higher risks of skin cancer.    All tanning damages the skin. Aim for ivory skin year round and you will have less trouble with your skin in years to come. There is no merit in getting \"a base tan\" before a warm weather vacation, as any tanning indicates your body's response to sun damage.    Stop smoking. Smokers have higher rates of skin cancer and also have premature skin wrinkling.    FYI  You should use about 3 tablespoons of sunscreen to protect your whole body. Thus a typical eight ounce bottle of sunscreen should last 4 applications. Remember, that the SPF rating is compromised if you don t apply enough. Most people only apply 1/2 - 1/3 of the amount they need. Also don t forget areas such as your ears, feet, upper back and harder to reach places. Keep in mind that these amounts should be increased for larger body sizes.    Sunscreens with titanium dioxide and/or zinc oxide in the active ingredients are physical blockers as opposed to chemical blockers. Chemical-free sunscreens should not irritate the skin.    Spray-on sunscreens may be used for touch-up application only, not as a base layer. Also, use with caution around small children due to inhalation risk.    Avoid retinyl palmitate products.    Avoid combination products that include both sunscreen and insect repellant, as sunscreen should be applied every 2 hours, but insect repellant should not be applied as frequently.    SPF means sun protection factor, which is just the degree to which the sunscreen can protect against UVB rays. There is no rating system for UVA rays. SPF is calculated as the time skin will burn when sunscreen is applied vs. skin without sunscreen.    Water resistant sunscreens should be re-applied every 1-2 hours.    For more information:  http://www.skincancer.org/prevention/sun-protection/sunscreen/sunscreens-safe-and-effective          Follow-ups after your visit        Follow-up notes from your care team     Return in " "about 1 year (around 11/1/2019) for Annual skin exam.      Who to contact     If you have questions or need follow up information about today's clinic visit or your schedule please contact [facility] directly at [phone].  Normal or non-critical lab and imaging results will be communicated to you by MyChart, letter or phone within 4 business days after the clinic has received the results. If you do not hear from us within 7 days, please contact the clinic through MyChart or phone. If you have a critical or abnormal lab result, we will notify you by phone as soon as possible.  Submit refill requests through [employer] or call your pharmacy and they will forward the refill request to us. Please allow 3 business days for your refill to be completed.          Additional Information About Your Visit        MyChart Information     [employer] gives you secure access to your electronic health record. If you see a primary care provider, you can also send messages to your care team and make appointments. If you have questions, please call your primary care clinic.  If you do not have a primary care provider, please call [phone] and they will assist you.        Care EveryWhere ID     This is your Care EveryWhere ID. This could be used by other organizations to access your [facility] medical records  [id number]        Your Vitals Were     Pulse Height Pulse Oximetry Breastfeeding? BMI (Body Mass Index)       66 1.626 m (5' 4\") 100% No 26.78 kg/m2        Blood Pressure from Last 3 Encounters:   11/01/18 122/78   06/07/18 120/72   08/11/17 120/78    Weight from Last 3 Encounters:   06/07/18 70.8 kg (156 lb)   03/29/18 71.7 kg (158 lb)   08/31/17 71.7 kg (158 lb)              Today, you had the following     No orders found for display       Primary Care Provider Office Phone # Fax #    Jessica Cheema PA-C [phone] [address] [facility] [address]        Equal " Access to Services     [facility]: Hadii rehana pendleton traci Sandoval, watonyda luqadaha, qaybta kaalmamakeda beltranfilemonfanta ashford. So [facility] [phone].    ATENCIÓN: Si habla español, tiene a rivas disposición servicios gratuitos de asistencia lingüística. Llame al [phone].    We comply with applicable federal civil rights laws and [place] laws. We do not discriminate on the basis of race, color, national origin, age, disability, sex, sexual orientation, or gender identity.            Thank you!     Thank you for choosing [facility]  for your care. Our goal is always to provide you with excellent care. Hearing back from our patients is one way we can continue to improve our services. Please take a few minutes to complete the written survey that you may receive in the mail after your visit with us. Thank you!             Your Updated Medication List - Protect others around you: Learn how to safely use, store and throw away your medicines at www.disposemymeds.org.          This list is accurate as of 11/1/18 10:12 AM.  Always use your most recent med list.                   Brand Name Dispense Instructions for use Diagnosis    acyclovir 400 MG tablet    ZOVIRAX    30 tablet    Take 1 tablet (400 mg) by mouth 3 times daily    Herpes simplex infection of perianal skin       aspirin 81 MG EC tablet     90 tablet    Take 1 tablet (81 mg) by mouth daily        atenolol 25 MG tablet    TENORMIN    90 tablet    TAKE 1 TABLET BY MOUTH DAILY    Hypertension goal BP (blood pressure) < 140/90, Routine general medical examination at a health care facility       cholecalciferol 1000 UNIT tablet    vitamin D3    100 tablet    Take 2 tablets (2,000 Units) by mouth daily    Encounter for vitamin deficiency screening       lovastatin 20 MG tablet    MEVACOR    90 tablet    TAKE 1 TABLET BY MOUTH AT BEDTIME    Hyperlipidemia, unspecified hyperlipidemia type, Routine general medical  examination at a health care facility

## 2018-11-01 NOTE — PATIENT INSTRUCTIONS
"FUTURE APPOINTMENTS  Follow up in 1 year(s)    SUN PROTECTION INSTRUCTIONS  Sun damage can lead to skin cancer and premature aging of the skin.      The best way to protect from sun damage to your skin is to avoid the sun during peak hours (10 am - 2 pm) even on overcast days.      Use UPF sun-protective clothing, which while more expensive initially provides longer lasting coverage without having to worry about remembering to re-apply.  1. Wear a wide-brimmed hat and sunglasses.   2. Wear sun-protective clothing.  [employer] and other [employer] make sun protective clothing that are stylish, comfortable and cool. [employer] and other [employer] make UV arm sleeves suitable for golfing, gardening and other activities.      Sunscreen instructions:  1. Use sunscreens with Zinc Oxide, Titanium Dioxide or Avobenzone to protect from UVA rays.  2. Use SPF 30-50+ to protect from UVB rays.  3. Re-apply every 2 hours even if water resistant.  4. Apply on your face every day even when cloudy and even in the winter. UVA \"aging rays\" penetrate window glass and are just as strong in the winter as in the summer.    Product Recommendations:    Good examples include: [employer], EltaMD, Solbar    Good daily moisturizers with SPF: Vanicream, CeraVe.    For sensitive skin, consider : SkinMedica Essential Defense Mineral Shield Broad Spectrum SPF 35    Men: consider use of Neutrogena Triple Protect Facial Lotion      Never use tanning beds. Tanning beds are associated with much higher risks of skin cancer.    All tanning damages the skin. Aim for ivory skin year round and you will have less trouble with your skin in years to come. There is no merit in getting \"a base tan\" before a warm weather vacation, as any tanning indicates your body's response to sun damage.    Stop smoking. Smokers have higher rates of skin cancer and also have premature skin wrinkling.    FYI  You should use about 3 tablespoons of sunscreen to " protect your whole body. Thus a typical eight ounce bottle of sunscreen should last 4 applications. Remember, that the SPF rating is compromised if you don t apply enough. Most people only apply 1/2 - 1/3 of the amount they need. Also don t forget areas such as your ears, feet, upper back and harder to reach places. Keep in mind that these amounts should be increased for larger body sizes.    Sunscreens with titanium dioxide and/or zinc oxide in the active ingredients are physical blockers as opposed to chemical blockers. Chemical-free sunscreens should not irritate the skin.    Spray-on sunscreens may be used for touch-up application only, not as a base layer. Also, use with caution around small children due to inhalation risk.    Avoid retinyl palmitate products.    Avoid combination products that include both sunscreen and insect repellant, as sunscreen should be applied every 2 hours, but insect repellant should not be applied as frequently.    SPF means sun protection factor, which is just the degree to which the sunscreen can protect against UVB rays. There is no rating system for UVA rays. SPF is calculated as the time skin will burn when sunscreen is applied vs. skin without sunscreen.    Water resistant sunscreens should be re-applied every 1-2 hours.    For more information:  http://www.skincancer.org/prevention/sun-protection/sunscreen/sunscreens-safe-and-effective

## 2018-11-01 NOTE — LETTER
11/1/2018         RE: Lidia Jenkins  [address] [place] [address]        Dear Colleague,    Thank you for referring your patient, Lidia Jenkins, to the [facility] [place]. Please see a copy of my visit note below.    [facility] - PRIMARY CARE SKIN    CC : skin cancer screening (full-body)  SUBJECTIVE:                                                    Lidia Jenkins is a 73 year old female who presents to clinic today for a full-body skin exam because of personal history of basal cell carcinoma.    Bothersome lesions noticed by the patient or other skin concerns :  Issue One : a lesion noted on the right scalp  Onset : years  Enlarging : NO.  Bleeding : NO  Itchy or irritating : NO.  Pain or tenderness : NO.  Changing color : NO.    Personal history of skin cancer : YES- basal cell carcinoma on back and right patella.  Family history of skin cancer : NO.    Personal Medical History  Eczema Psoriasis Autoimmune   NO NO NO     Family Medical History  Eczema Psoriasis Autoimmune   NO YES- in father NO     Sun Exposure History  Previous history of significant sun exposure:  Blistering sunburns : NO  Tanning beds : NO.  Sunscreen Use : YES, frequency : sometimes. 30+ SPF  UV-protective clothing use : NO  Wide-brimmed hats : YES  UV-protective sunglasses : sometimes  Avoids mid-day sun : YES    Occupation : retired  (both indoor & outdoor).    Refer to electronic medical record (EMR) for past medical history and medications.    ROS : 14 point review of systems was negative except the symptoms listed above in the HPI.    This document serves as a record of the services and decisions personally performed and made by Yary Linder MD. It was created on her behalf by Crissy Anderson, a trained medical scribe.  The creation of this document is based on the scribe's personal observations and the provider's statements to the medical scribe.  Crissy Anderson, November 1,  2018 9:50 AM    OBJECTIVE:                                                    GENERAL: healthy, alert and no distress  SKIN: Ng Skin Type - I.  This patient was examined from the top of the head to the bottom of the feet  including scalp, face, neck, back, chest, breasts, buttocks, both arms, both legs, both hands, both feet, all 10 fingers and all 10 toes. The dermatoscope was used to help evaluate pigmented lesions.  Skin Pertinent Findings:  Scalp : stuck-on appearing papules, raised, brown, coarse-textured, round lesion(s) most consistent with seborrheic keratoses.     Face : stuck-on appearing papules, raised, brown, coarse-textured, round lesion(s) most consistent with seborrheic keratoses. brown, macule(s) most consistent with benign solar lentigo    Right arm(s) and left arm(s) : multiple brown, macule(s) most consistent with benign solar lentigo. Scattered brown macules of various sizes and shapes most consistent with (benign) melanocytic nevi    Chest : brown, macule(s) most consistent with benign solar lentigo. stuck-on appearing papules, raised, brown, coarse-textured, round lesion(s) most consistent with seborrheic keratoses.     Abdomen : multiple slightly raised, red lesion(s) consistent with capillary hemangioma    Anterior legs : well healed scar on right superior knee    Back : Slightly raised, red lesion(s) consistent with capillary hemangioma. Scattered stuck-on appearing papules, raised, brown, coarse-textured, round lesion(s) most consistent with seborrheic keratoses. Brown, macule(s) most consistent with benign solar lentigo    Diagnostic Test Results:  none         ASSESSMENT:                                                      Encounter Diagnoses   Name Primary?     History of basal cell carcinoma Yes     Solar lentigo      Multiple benign nevi      Seborrheic keratoses      PLAN:                                                    Patient Instructions   FUTURE APPOINTMENTS  Follow  "up in 1 year(s)    SUN PROTECTION INSTRUCTIONS  Sun damage can lead to skin cancer and premature aging of the skin.      The best way to protect from sun damage to your skin is to avoid the sun during peak hours (10 am - 2 pm) even on overcast days.      Use UPF sun-protective clothing, which while more expensive initially provides longer lasting coverage without having to worry about remembering to re-apply.  1. Wear a wide-brimmed hat and sunglasses.   2. Wear sun-protective clothing.  [employer] and other [employer] make sun protective clothing that are stylish, comfortable and cool. "[employer] and other [employer]thing make UV arm sleeves suitable for golfing, gardening and other activities.      Sunscreen instructions:  1. Use sunscreens with Zinc Oxide, Titanium Dioxide or Avobenzone to protect from UVA rays.  2. Use SPF 30-50+ to protect from UVB rays.  3. Re-apply every 2 hours even if water resistant.  4. Apply on your face every day even when cloudy and even in the winter. UVA \"aging rays\" penetrate window glass and are just as strong in the winter as in the summer.    Product Recommendations:    Good examples include: Blue Lizard, EltaMD, Solbar    Good daily moisturizers with SPF: Vanicream, CeraVe.    For sensitive skin, consider : SkinMedica Essential Defense Mineral Shield Broad Spectrum SPF 35    Men: consider use of Neutrogena Triple Protect Facial Lotion      Never use tanning beds. Tanning beds are associated with much higher risks of skin cancer.    All tanning damages the skin. Aim for ivory skin year round and you will have less trouble with your skin in years to come. There is no merit in getting \"a base tan\" before a warm weather vacation, as any tanning indicates your body's response to sun damage.    Stop smoking. Smokers have higher rates of skin cancer and also have premature skin wrinkling.    FYI  You should use about 3 tablespoons of sunscreen to protect your whole body. Thus a " typical eight ounce bottle of sunscreen should last 4 applications. Remember, that the SPF rating is compromised if you don t apply enough. Most people only apply 1/2 - 1/3 of the amount they need. Also don t forget areas such as your ears, feet, upper back and harder to reach places. Keep in mind that these amounts should be increased for larger body sizes.    Sunscreens with titanium dioxide and/or zinc oxide in the active ingredients are physical blockers as opposed to chemical blockers. Chemical-free sunscreens should not irritate the skin.    Spray-on sunscreens may be used for touch-up application only, not as a base layer. Also, use with caution around small children due to inhalation risk.    Avoid retinyl palmitate products.    Avoid combination products that include both sunscreen and insect repellant, as sunscreen should be applied every 2 hours, but insect repellant should not be applied as frequently.    SPF means sun protection factor, which is just the degree to which the sunscreen can protect against UVB rays. There is no rating system for UVA rays. SPF is calculated as the time skin will burn when sunscreen is applied vs. skin without sunscreen.    Water resistant sunscreens should be re-applied every 1-2 hours.    For more information:  http://www.skincancer.org/prevention/sun-protection/sunscreen/sunscreens-safe-and-effective     The patient was counseled about sunscreens and sun avoidance. The patient was counseled to check the skin regularly and report any lesion that is new, changing, itching, scabbing, bleeding or otherwise bothersome. The patient was discharged ambulatory and in stable condition.    PROCEDURES:                                                    None.    TT : 25 minutes.  CT : 15 minutes.      The information in this document, created by the medical scribe for me, accurately reflects the services I personally performed and the decisions made by me. I have reviewed and approved  this document for accuracy prior to leaving the patient care area.  November 1, 2018 9:50 AM  Mary Linder MD  [facility]    Again, thank you for allowing me to participate in the care of your patient.        Sincerely,        Mary Linder MD

## 2018-11-01 NOTE — PROGRESS NOTES
[facility] - PRIMARY CARE SKIN    CC : skin cancer screening (full-body)  SUBJECTIVE:                                                    Lidia Jenkins is a 73 year old female who presents to clinic today for a full-body skin exam because of personal history of basal cell carcinoma.    Bothersome lesions noticed by the patient or other skin concerns :  Issue One : a lesion noted on the right scalp  Onset : years  Enlarging : NO.  Bleeding : NO  Itchy or irritating : NO.  Pain or tenderness : NO.  Changing color : NO.    Personal history of skin cancer : YES- basal cell carcinoma on back and right patella.  Family history of skin cancer : NO.    Personal Medical History  Eczema Psoriasis Autoimmune   NO NO NO     Family Medical History  Eczema Psoriasis Autoimmune   NO YES- in father NO     Sun Exposure History  Previous history of significant sun exposure:  Blistering sunburns : NO  Tanning beds : NO.  Sunscreen Use : YES, frequency : sometimes. 30+ SPF  UV-protective clothing use : NO  Wide-brimmed hats : YES  UV-protective sunglasses : sometimes  Avoids mid-day sun : YES    Occupation : retired  (both indoor & outdoor).    Refer to electronic medical record (EMR) for past medical history and medications.    ROS : 14 point review of systems was negative except the symptoms listed above in the HPI.    This document serves as a record of the services and decisions personally performed and made by Yary Linder MD. It was created on her behalf by Crissy Anderson, a trained medical scribe.  The creation of this document is based on the scribe's personal observations and the provider's statements to the medical scribe.  Crissy Anderson, November 1, 2018 9:50 AM    OBJECTIVE:                                                    GENERAL: healthy, alert and no distress  SKIN: Ng Skin Type - I.  This patient was examined from the top of the head to the bottom of the feet  including scalp,  face, neck, back, chest, breasts, buttocks, both arms, both legs, both hands, both feet, all 10 fingers and all 10 toes. The dermatoscope was used to help evaluate pigmented lesions.  Skin Pertinent Findings:  Scalp : stuck-on appearing papules, raised, brown, coarse-textured, round lesion(s) most consistent with seborrheic keratoses.     Face : stuck-on appearing papules, raised, brown, coarse-textured, round lesion(s) most consistent with seborrheic keratoses. brown, macule(s) most consistent with benign solar lentigo    Right arm(s) and left arm(s) : multiple brown, macule(s) most consistent with benign solar lentigo. Scattered brown macules of various sizes and shapes most consistent with (benign) melanocytic nevi    Chest : brown, macule(s) most consistent with benign solar lentigo. stuck-on appearing papules, raised, brown, coarse-textured, round lesion(s) most consistent with seborrheic keratoses.     Abdomen : multiple slightly raised, red lesion(s) consistent with capillary hemangioma    Anterior legs : well healed scar on right superior knee    Back : Slightly raised, red lesion(s) consistent with capillary hemangioma. Scattered stuck-on appearing papules, raised, brown, coarse-textured, round lesion(s) most consistent with seborrheic keratoses. Brown, macule(s) most consistent with benign solar lentigo    Diagnostic Test Results:  none         ASSESSMENT:                                                      Encounter Diagnoses   Name Primary?     History of basal cell carcinoma Yes     Solar lentigo      Multiple benign nevi      Seborrheic keratoses      PLAN:                                                    Patient Instructions   FUTURE APPOINTMENTS  Follow up in 1 year(s)    SUN PROTECTION INSTRUCTIONS  Sun damage can lead to skin cancer and premature aging of the skin.      The best way to protect from sun damage to your skin is to avoid the sun during peak hours (10 am - 2 pm) even on overcast  "days.      Use UPF sun-protective clothing, which while more expensive initially provides longer lasting coverage without having to worry about remembering to re-apply.  1. Wear a wide-brimmed hat and sunglasses.   2. Wear sun-protective clothing.  [employer] and other [employer] make sun protective clothing that are stylish, comfortable and cool. [employer] and other [employer] make UV arm sleeves suitable for golfing, gardening and other activities.      Sunscreen instructions:  1. Use sunscreens with Zinc Oxide, Titanium Dioxide or Avobenzone to protect from UVA rays.  2. Use SPF 30-50+ to protect from UVB rays.  3. Re-apply every 2 hours even if water resistant.  4. Apply on your face every day even when cloudy and even in the winter. UVA \"aging rays\" penetrate window glass and are just as strong in the winter as in the summer.    Product Recommendations:    Good examples include: Blue Lizard, EltaMD, Solbar    Good daily moisturizers with SPF: Vanicream, CeraVe.    For sensitive skin, consider : SkinMedica Essential Defense Mineral Shield Broad Spectrum SPF 35    Men: consider use of Neutrogena Triple Protect Facial Lotion      Never use tanning beds. Tanning beds are associated with much higher risks of skin cancer.    All tanning damages the skin. Aim for ivory skin year round and you will have less trouble with your skin in years to come. There is no merit in getting \"a base tan\" before a warm weather vacation, as any tanning indicates your body's response to sun damage.    Stop smoking. Smokers have higher rates of skin cancer and also have premature skin wrinkling.    FYI  You should use about 3 tablespoons of sunscreen to protect your whole body. Thus a typical eight ounce bottle of sunscreen should last 4 applications. Remember, that the SPF rating is compromised if you don t apply enough. Most people only apply 1/2 - 1/3 of the amount they need. Also don t forget areas such as your ears, feet, " upper back and harder to reach places. Keep in mind that these amounts should be increased for larger body sizes.    Sunscreens with titanium dioxide and/or zinc oxide in the active ingredients are physical blockers as opposed to chemical blockers. Chemical-free sunscreens should not irritate the skin.    Spray-on sunscreens may be used for touch-up application only, not as a base layer. Also, use with caution around small children due to inhalation risk.    Avoid retinyl palmitate products.    Avoid combination products that include both sunscreen and insect repellant, as sunscreen should be applied every 2 hours, but insect repellant should not be applied as frequently.    SPF means sun protection factor, which is just the degree to which the sunscreen can protect against UVB rays. There is no rating system for UVA rays. SPF is calculated as the time skin will burn when sunscreen is applied vs. skin without sunscreen.    Water resistant sunscreens should be re-applied every 1-2 hours.    For more information:  http://www.skincancer.org/prevention/sun-protection/sunscreen/sunscreens-safe-and-effective     The patient was counseled about sunscreens and sun avoidance. The patient was counseled to check the skin regularly and report any lesion that is new, changing, itching, scabbing, bleeding or otherwise bothersome. The patient was discharged ambulatory and in stable condition.    PROCEDURES:                                                    None.    TT : 25 minutes.  CT : 15 minutes.      The information in this document, created by the medical scribe for me, accurately reflects the services I personally performed and the decisions made by me. I have reviewed and approved this document for accuracy prior to leaving the patient care area.  November 1, 2018 9:50 AM  Mary Linder MD  [facility]

## 2018-11-17 DIAGNOSIS — E78.5 HYPERLIPIDEMIA, UNSPECIFIED HYPERLIPIDEMIA TYPE: ICD-10-CM

## 2018-11-17 DIAGNOSIS — Z00.00 ROUTINE GENERAL MEDICAL EXAMINATION AT A HEALTH CARE FACILITY: ICD-10-CM

## 2018-11-17 NOTE — TELEPHONE ENCOUNTER
"Requested Prescriptions   Pending Prescriptions Disp Refills     lovastatin (MEVACOR) 20 MG tablet [Pharmacy Med Name: LOVASTATIN 20MG TABLETS] 90 tablet 0          Last Written Prescription Date:  8/6/18  Last Fill Quantity: 90,   # refills: 0  Last Office Visit: 11/1/18  Future Office visit:       Routing refill request to provider for review/approval because:  No LDL  Hyperlipidemia, unspecified hyperlipidemia type [E78.5]          Routine general medical examination at a health care facility [Z00.00]            Sig: TAKE 1 TABLET BY MOUTH AT BEDTIME    Statins Protocol Passed    11/17/2018  9:41 AM       Passed - LDL on file in past 12 months    Recent Labs   Lab Test  06/07/18   0855   LDL  103*            Passed - No abnormal creatine kinase in past 12 months    No lab results found.            Passed - Recent (12 mo) or future (30 days) visit within the authorizing provider's specialty    Patient had office visit in the last 12 months or has a visit in the next 30 days with authorizing provider or within the authorizing provider's specialty.  See \"Patient Info\" tab in inbasket, or \"Choose Columns\" in Meds & Orders section of the refill encounter.             Passed - Patient is age 18 or older       Passed - No active pregnancy on record       Passed - No positive pregnancy test in past 12 months          "

## 2018-11-19 RX ORDER — LOVASTATIN 20 MG
TABLET ORAL
Qty: 90 TABLET | Refills: 0 | Status: SHIPPED | OUTPATIENT
Start: 2018-11-19 | End: 2019-02-22

## 2019-02-08 ENCOUNTER — TRANSFERRED RECORDS (OUTPATIENT)
Dept: HEALTH INFORMATION MANAGEMENT | Facility: CLINIC | Age: 74
End: 2019-02-08

## 2019-02-22 DIAGNOSIS — E78.5 HYPERLIPIDEMIA, UNSPECIFIED HYPERLIPIDEMIA TYPE: ICD-10-CM

## 2019-02-22 DIAGNOSIS — Z00.00 ROUTINE GENERAL MEDICAL EXAMINATION AT A HEALTH CARE FACILITY: ICD-10-CM

## 2019-02-22 NOTE — TELEPHONE ENCOUNTER
"Requested Prescriptions   Pending Prescriptions Disp Refills     lovastatin (MEVACOR) 20 MG tablet [Pharmacy Med Name: LOVASTATIN 20MG TABLETS]  Last Written Prescription Date:  11/19/2018  Last Fill Quantity: 90 tabs,  # refills: 0   Last office visit: 11/1/2018 with prescribing provider:  Deni   Future Office Visit:     90 tablet 0     Sig: TAKE 1 TABLET BY MOUTH AT BEDTIME    Statins Protocol Passed - 2/22/2019  7:07 AM       Passed - LDL on file in past 12 months    Recent Labs   Lab Test 06/07/18  0855   *            Passed - No abnormal creatine kinase in past 12 months    No lab results found.            Passed - Recent (12 mo) or future (30 days) visit within the authorizing provider's specialty    Patient had office visit in the last 12 months or has a visit in the next 30 days with authorizing provider or within the authorizing provider's specialty.  See \"Patient Info\" tab in inbasket, or \"Choose Columns\" in Meds & Orders section of the refill encounter.             Passed - Medication is active on med list       Passed - Patient is age 18 or older       Passed - No active pregnancy on record       Passed - No positive pregnancy test in past 12 months          "

## 2019-02-25 RX ORDER — LOVASTATIN 20 MG
TABLET ORAL
Qty: 90 TABLET | Refills: 0 | Status: SHIPPED | OUTPATIENT
Start: 2019-02-25 | End: 2019-05-24

## 2019-04-01 ENCOUNTER — OFFICE VISIT (OUTPATIENT)
Dept: FAMILY MEDICINE | Facility: CLINIC | Age: 74
End: 2019-04-01
Payer: COMMERCIAL

## 2019-04-01 VITALS — SYSTOLIC BLOOD PRESSURE: 120 MMHG | DIASTOLIC BLOOD PRESSURE: 88 MMHG

## 2019-04-01 DIAGNOSIS — L30.9 DERMATITIS: Primary | ICD-10-CM

## 2019-04-01 DIAGNOSIS — Z85.828 HISTORY OF BASAL CELL CARCINOMA: ICD-10-CM

## 2019-04-01 LAB
KOH PREP SPEC: NORMAL
SPECIMEN SOURCE: NORMAL

## 2019-04-01 PROCEDURE — 99214 OFFICE O/P EST MOD 30 MIN: CPT | Performed by: FAMILY MEDICINE

## 2019-04-01 PROCEDURE — 87220 TISSUE EXAM FOR FUNGI: CPT | Performed by: FAMILY MEDICINE

## 2019-04-01 RX ORDER — TRIAMCINOLONE ACETONIDE 1 MG/G
CREAM TOPICAL 2 TIMES DAILY
Qty: 80 G | Refills: 1 | Status: SHIPPED | OUTPATIENT
Start: 2019-04-01 | End: 2019-06-10

## 2019-04-01 NOTE — PROGRESS NOTES
"[facility] - PRIMARY CARE SKIN    CC: Rash  SUBJECTIVE:   Lidia Jenkins is a(n) 73 year old female who presents to clinic today because of a(n) rash which started in February 2019 on the left hip while she was in [place]. She returned to [place] on Feb 23. Other spots on the left and right side have developed after returning to [place].    Areas of involvement: migratory in various spots on the trunk and possibly on the left knee.    Itchiness: YES - \"very itchy\". The initial area on the left side is still itchy. She denies any pain whatsoever.  Scaling: NO.  Blistering: She had noticed blistering.  Transient: NO.    Symptoms appear to be: continuing to develop.  Aggravating factors: none identified.  Relieving factors: none identified.    Recent exposure history: Travel to [place] including forest, ocean, river.  Previous history of a similar rash: NO.    Products used: no new products used.  Therapies tried: None.    Personal Medical History  Skin cancer: YES - basal cell carcinoma on back  Eczema Psoriasis Autoimmune   NO NO NO   Other: history of atrophic vaginitis and genitalia pruritus.    Family Medical History  Skin cancer: NO  Eczema Psoriasis Autoimmune   NO YES NO     Sun Exposure History  Previous history of significant sun exposure:  Blistering sunburns: NO.  Tanning beds: NO.  Sunscreen usage: YES, frequency: daily on face and whenever outdoors on body.  UV-protective clothing usage: NO.  Wide-brimmed hat usage: YES.  UV-protective sunglasses usage: YES.  Mid-day sun avoidance: YES.    Occupation: retired,  (both indoor & outdoor).    Patient Active Problem List   Diagnosis     Hypertension goal BP (blood pressure) < 140/90     History of basal cell carcinoma     Advance care planning       Past Medical History:   Diagnosis Date     Arthritis      Basal cell carcinoma      Hyperlipidaemia LDL goal < 100      Hypertension goal BP (blood pressure) < 140/90      Psoriasis     " Past Surgical History:   Procedure Laterality Date     ABDOMEN SURGERY  1987     COLONOSCOPY  2012      Social History     Tobacco Use     Smoking status: Never Smoker     Smokeless tobacco: Never Used   Substance Use Topics     Alcohol use: Yes     Alcohol/week: 2.4 oz     Comment: 1-4 glasses of wine a week     Drug use: No    Family History     Problem (# of Occurrences) Relation (Name,Age of Onset)    Breast Cancer (1) Mother (Syeda)    Coronary Artery Disease (2) Father (Pepito): MI, Brother (2-3 of them)    Depression (2) Mother (Syeda), Sister (Marilou)    Hyperlipidemia (6) Mother (Syeda), Brother, Sister, Father (Pepito), Brother (2-3 of them), Sister (Marilou)    Hypertension (6) Mother (Syeda), Brother, Sister, Father (Pepito), Brother (2-3 of them), Sister (Marilou)    Substance Abuse (3) Father (Pepito), Brother (2-3 of them), Sister (Marilou)       Negative family history of: Other Cancer           Current Outpatient Medications   Medication Sig Dispense Refill     acyclovir (ZOVIRAX) 400 MG tablet Take 1 tablet (400 mg) by mouth 3 times daily 30 tablet 1     cholecalciferol (VITAMIN D) 1000 UNIT tablet Take 2 tablets (2,000 Units) by mouth daily 100 tablet 3     lovastatin (MEVACOR) 20 MG tablet TAKE 1 TABLET BY MOUTH AT BEDTIME 90 tablet 0     triamcinolone (KENALOG) 0.1 % external cream Apply topically 2 times daily to affected areas on trunk and legs for 10-14 days. Not for use on face nor groin. 80 g 1     atenolol (TENORMIN) 25 MG tablet TAKE 1 TABLET BY MOUTH DAILY 90 tablet 2       No Known Allergies     INTEGUMENTARY/SKIN: POSITIVE for pruritus and rash  ROS: 14 point review of systems was negative except the symptoms listed above in the HPI.    This document serves as a record of the services and decisions personally performed and made by Mary Linder MD and was created by Patrick Yeung, a trained medical scribe, based on personal observations and provider statements to the medical  tri.  April 1, 2019 11:17 AM   Patrick Yeung    OBJECTIVE:   GENERAL: healthy, alert and no distress.  SKIN: Ng Skin Type - I.  Face, neck, trunk, legs, arms examined. The dermatoscope was used to help evaluate pigmented lesions.  Skin Pertinent Findings:  Left lateral abdomen: Two discrete 10 and 15 mm patches of a papular eruption.    Right abdomen: Single papule.    Left knee: no rasn    Back: no rash    Diagnostic Test Results:  Results for orders placed or performed in visit on 04/01/19 (from the past 24 hour(s))   KOH skin hair or nails   Result Value Ref Range    Specimen Description Skin     KOH Skin Hair Nails Test No fungal elements seen      ASSESSMENT:     Encounter Diagnoses   Name Primary?     Dermatitis Yes     History of basal cell carcinoma        PLAN:   Patient Instructions   FUTURE APPOINTMENTS  Follow up as needed.    TOPICAL STEROID INSTRUCTIONS  Triamcinolone 0.1% cream.  Apply two times per day for 10-14 days. Then, use only when needed.  1. Wash hands before applying topical steroid.  2. Apply sparingly (just enough to rub in) onto affected areas of the trunk and legs.  (Use the adult fingertip unit (FTU) as a guide.) Apply no more than 1 FTU per area.      This higher strength steroid should never be used on face nor groin.    After the initial treatment, topical steroid may be used as needed for flare-ups but only for short-term treatment. If you are using this for prolonged periods of time to control flare-ups, return to clinic for re-evaluation of treatment.    Keep in mind to also regularly use moisturizer, as this preventative measure can help maintain your skin's natural protective moisture barrier.      TT: 25 minutes.  CT: 20 minutes.    The information in this document, created by the medical scribe for me, accurately reflects the services I personally performed and the decisions made by me. I have reviewed and approved this document for accuracy prior to leaving the  patient care area.  April 1, 2019 11:17 AM  Mary Linder MD  [facility] [place]

## 2019-04-01 NOTE — LETTER
"    4/1/2019         RE: Lidia Jenkins  [address] [place] [id number]        Dear Colleague,    Thank you for referring your patient, Lidia Jenkins, to the [facility]. Please see a copy of my visit note below.    [facility] - PRIMARY CARE SKIN    CC: Rash  SUBJECTIVE:   Lidia Jenkins is a(n) 73 year old female who presents to clinic today because of a(n) rash which started in February 2019 on the left hip while she was in [place]. She returned to [place] on Feb 23. Other spots on the left and right side have developed after returning to [place].    Areas of involvement: migratory in various spots on the trunk and possibly on the left knee.    Itchiness: YES - \"very itchy\". The initial area on the left side is still itchy. She denies any pain whatsoever.  Scaling: NO.  Blistering: She had noticed blistering.  Transient: NO.    Symptoms appear to be: continuing to develop.  Aggravating factors: none identified.  Relieving factors: none identified.    Recent exposure history: Travel to [place] including forest, ocean, river.  Previous history of a similar rash: NO.    Products used: no new products used.  Therapies tried: None.    Personal Medical History  Skin cancer: YES - basal cell carcinoma on back  Eczema Psoriasis Autoimmune   NO NO NO   Other: history of atrophic vaginitis and genitalia pruritus.    Family Medical History  Skin cancer: NO  Eczema Psoriasis Autoimmune   NO YES NO     Sun Exposure History  Previous history of significant sun exposure:  Blistering sunburns: NO.  Tanning beds: NO.  Sunscreen usage: YES, frequency: daily on face and whenever outdoors on body.  UV-protective clothing usage: NO.  Wide-brimmed hat usage: YES.  UV-protective sunglasses usage: YES.  Mid-day sun avoidance: YES.    Occupation: retired,  (both indoor & outdoor).    Patient Active Problem List   Diagnosis     Hypertension goal BP (blood pressure) < 140/90     " History of basal cell carcinoma     Advance care planning       Past Medical History:   Diagnosis Date     Arthritis      Basal cell carcinoma      Hyperlipidaemia LDL goal < 100      Hypertension goal BP (blood pressure) < 140/90      Psoriasis     Past Surgical History:   Procedure Laterality Date     ABDOMEN SURGERY  1987     COLONOSCOPY  2012      Social History     Tobacco Use     Smoking status: Never Smoker     Smokeless tobacco: Never Used   Substance Use Topics     Alcohol use: Yes     Alcohol/week: 2.4 oz     Comment: 1-4 glasses of wine a week     Drug use: No    Family History     Problem (# of Occurrences) Relation (Name,Age of Onset)    Breast Cancer (1) Mother (Syeda)    Coronary Artery Disease (2) Father (Pepito): MI, Brother (2-3 of them)    Depression (2) Mother (Syeda), Sister (Marilou)    Hyperlipidemia (6) Mother (Syeda), Brother, Sister, Father (Pepito), Brother (2-3 of them), Sister (Marilou)    Hypertension (6) Mother (Syeda), Brother, Sister, Father (Pepito), Brother (2-3 of them), Sister (Marilou)    Substance Abuse (3) Father (Pepito), Brother (2-3 of them), Sister (Marilou)       Negative family history of: Other Cancer           Current Outpatient Medications   Medication Sig Dispense Refill     acyclovir (ZOVIRAX) 400 MG tablet Take 1 tablet (400 mg) by mouth 3 times daily 30 tablet 1     cholecalciferol (VITAMIN D) 1000 UNIT tablet Take 2 tablets (2,000 Units) by mouth daily 100 tablet 3     lovastatin (MEVACOR) 20 MG tablet TAKE 1 TABLET BY MOUTH AT BEDTIME 90 tablet 0     triamcinolone (KENALOG) 0.1 % external cream Apply topically 2 times daily to affected areas on trunk and legs for 10-14 days. Not for use on face nor groin. 80 g 1     atenolol (TENORMIN) 25 MG tablet TAKE 1 TABLET BY MOUTH DAILY 90 tablet 2       No Known Allergies     INTEGUMENTARY/SKIN: POSITIVE for pruritus and rash  ROS: 14 point review of systems was negative except the symptoms listed above in the HPI.    This  document serves as a record of the services and decisions personally performed and made by Mary Linder MD and was created by Patrick Yeung, a trained medical scribe, based on personal observations and provider statements to the medical scribe.  April 1, 2019 11:17 AM   Patrick Yeung    OBJECTIVE:   GENERAL: healthy, alert and no distress.  SKIN: Ng Skin Type - I.  Face, neck, trunk, legs, arms examined. The dermatoscope was used to help evaluate pigmented lesions.  Skin Pertinent Findings:  Left lateral abdomen: Two discrete 10 and 15 mm patches of a papular eruption.    Right abdomen: Single papule.    Left knee: no rasn    Back: no rash    Diagnostic Test Results:  Results for orders placed or performed in visit on 04/01/19 (from the past 24 hour(s))   KOH skin hair or nails   Result Value Ref Range    Specimen Description Skin     KOH Skin Hair Nails Test No fungal elements seen      ASSESSMENT:     Encounter Diagnoses   Name Primary?     Dermatitis Yes     History of basal cell carcinoma        PLAN:   Patient Instructions   FUTURE APPOINTMENTS  Follow up as needed.    TOPICAL STEROID INSTRUCTIONS  Triamcinolone 0.1% cream.  Apply two times per day for 10-14 days. Then, use only when needed.  1. Wash hands before applying topical steroid.  2. Apply sparingly (just enough to rub in) onto affected areas of the trunk and legs.  (Use the adult fingertip unit (FTU) as a guide.) Apply no more than 1 FTU per area.      This higher strength steroid should never be used on face nor groin.    After the initial treatment, topical steroid may be used as needed for flare-ups but only for short-term treatment. If you are using this for prolonged periods of time to control flare-ups, return to clinic for re-evaluation of treatment.    Keep in mind to also regularly use moisturizer, as this preventative measure can help maintain your skin's natural protective moisture barrier.      TT: 25 minutes.  CT: 20  minutes.    The information in this document, created by the medical scribe for me, accurately reflects the services I personally performed and the decisions made by me. I have reviewed and approved this document for accuracy prior to leaving the patient care area.  April 1, 2019 11:17 AM  Mary Linder MD  [facility] [place]    Again, thank you for allowing me to participate in the care of your patient.        Sincerely,        Mary Linder MD

## 2019-05-24 DIAGNOSIS — Z00.00 ROUTINE GENERAL MEDICAL EXAMINATION AT A HEALTH CARE FACILITY: ICD-10-CM

## 2019-05-24 DIAGNOSIS — I10 HYPERTENSION GOAL BP (BLOOD PRESSURE) < 140/90: ICD-10-CM

## 2019-05-24 DIAGNOSIS — E78.5 HYPERLIPIDEMIA, UNSPECIFIED HYPERLIPIDEMIA TYPE: ICD-10-CM

## 2019-05-24 NOTE — TELEPHONE ENCOUNTER
"Requested Prescriptions   Pending Prescriptions Disp Refills     lovastatin (MEVACOR) 20 MG tablet [Pharmacy Med Name: LOVASTATIN 20MG TABLETS]  Last Written Prescription Date:  2/25/2019  Last Fill Quantity: 90 tabs,  # refills: 0   Last office visit: 4/1/2019 with prescribing provider:  Kailash   Future Office Visit:     90 tablet 0     Sig: TAKE 1 TABLET BY MOUTH AT BEDTIME       Statins Protocol Passed - 5/24/2019  9:47 AM        Passed - LDL on file in past 12 months     Recent Labs   Lab Test 06/07/18  0855   *             Passed - No abnormal creatine kinase in past 12 months     No lab results found.             Passed - Recent (12 mo) or future (30 days) visit within the authorizing provider's specialty     Patient had office visit in the last 12 months or has a visit in the next 30 days with authorizing provider or within the authorizing provider's specialty.  See \"Patient Info\" tab in inbasket, or \"Choose Columns\" in Meds & Orders section of the refill encounter.              Passed - Medication is active on med list        Passed - Patient is age 18 or older        Passed - No active pregnancy on record        Passed - No positive pregnancy test in past 12 months          "

## 2019-05-24 NOTE — TELEPHONE ENCOUNTER
"Requested Prescriptions   Pending Prescriptions Disp Refills     atenolol (TENORMIN) 25 MG tablet [Pharmacy Med Name: ATENOLOL 25MG TABLETS]  Last Written Prescription Date:  8/6/2018  Last Fill Quantity: 90 tabs,  # refills: 2   Last office visit: 4/1/2019 with prescribing provider:  Kailash   Future Office Visit:     90 tablet 0     Sig: TAKE 1 TABLET BY MOUTH DAILY       Beta-Blockers Protocol Passed - 5/24/2019  9:47 AM        Passed - Blood pressure under 140/90 in past 12 months     BP Readings from Last 3 Encounters:   04/01/19 120/88   11/01/18 122/78   06/07/18 120/72                 Passed - Patient is age 6 or older        Passed - Recent (12 mo) or future (30 days) visit within the authorizing provider's specialty     Patient had office visit in the last 12 months or has a visit in the next 30 days with authorizing provider or within the authorizing provider's specialty.  See \"Patient Info\" tab in inbasket, or \"Choose Columns\" in Meds & Orders section of the refill encounter.              Passed - Medication is active on med list          "

## 2019-05-27 RX ORDER — ATENOLOL 25 MG/1
TABLET ORAL
Qty: 90 TABLET | Refills: 3 | Status: SHIPPED | OUTPATIENT
Start: 2019-05-27 | End: 2020-03-27

## 2019-05-27 RX ORDER — LOVASTATIN 20 MG
TABLET ORAL
Qty: 90 TABLET | Refills: 0 | Status: SHIPPED | OUTPATIENT
Start: 2019-05-27 | End: 2019-06-10

## 2019-05-28 NOTE — TELEPHONE ENCOUNTER
Signed Prescriptions:                        Disp   Refills    atenolol (TENORMIN) 25 MG tablet           90 tab*3        Sig: TAKE 1 TABLET BY MOUTH DAILY  Authorizing Provider: PLACIDO ABARCA  Ordering User: NATASHA WAGNER

## 2019-05-28 NOTE — TELEPHONE ENCOUNTER
Prescription approved per [facility] Refill Protocol.    Signed Prescriptions:                        Disp   Refills    lovastatin (MEVACOR) 20 MG tablet          90 tab*0        Sig: TAKE 1 TABLET BY MOUTH AT BEDTIME  Authorizing Provider: SOCO DYE  Ordering User: NATASHA WAGNER

## 2019-05-29 DIAGNOSIS — Z00.00 ROUTINE GENERAL MEDICAL EXAMINATION AT A HEALTH CARE FACILITY: ICD-10-CM

## 2019-05-29 DIAGNOSIS — I10 HYPERTENSION GOAL BP (BLOOD PRESSURE) < 140/90: ICD-10-CM

## 2019-05-29 DIAGNOSIS — E78.5 HYPERLIPIDEMIA, UNSPECIFIED HYPERLIPIDEMIA TYPE: ICD-10-CM

## 2019-05-30 NOTE — TELEPHONE ENCOUNTER
"Requested Prescriptions   Pending Prescriptions Disp Refills     atenolol (TENORMIN) 25 MG tablet [Pharmacy Med Name: ATENOLOL 25MG TABLETS] 90 tablet 0     Sig: TAKE 1 TABLET BY MOUTH DAILY  Last Written Prescription Date:  5/27/2019  Last Fill Quantity: 90 tablet,  # refills: 3   Last Office Visit: 4/1/2019   Future Office Visit:            Beta-Blockers Protocol Passed - 5/29/2019  5:33 PM        Passed - Blood pressure under 140/90 in past 12 months     BP Readings from Last 3 Encounters:   04/01/19 120/88   11/01/18 122/78   06/07/18 120/72           Passed - Patient is age 6 or older        Passed - Recent (12 mo) or future (30 days) visit within the authorizing provider's specialty     Patient had office visit in the last 12 months or has a visit in the next 30 days with authorizing provider or within the authorizing provider's specialty.  See \"Patient Info\" tab in inbasket, or \"Choose Columns\" in Meds & Orders section of the refill encounter.            Passed - Medication is active on med list     _________________________________________________________________     lovastatin (MEVACOR) 20 MG tablet [Pharmacy Med Name: LOVASTATIN 20MG TABLETS] 90 tablet 0     Sig: TAKE 1 TABLET BY MOUTH AT BEDTIME  Last Written Prescription Date:  5/27/2019  Last Fill Quantity: 90 tablet,  # refills: 0   Last Office Visit: 4/1/2019   Future Office Visit:            Statins Protocol Passed - 5/29/2019  5:33 PM        Passed - LDL on file in past 12 months     Recent Labs   Lab Test 06/07/18  0855   *           Passed - No abnormal creatine kinase in past 12 months     No lab results found.           Passed - Recent (12 mo) or future (30 days) visit within the authorizing provider's specialty     Patient had office visit in the last 12 months or has a visit in the next 30 days with authorizing provider or within the authorizing provider's specialty.  See \"Patient Info\" tab in inbasket, or \"Choose Columns\" in Meds & " Orders section of the refill encounter.            Passed - Medication is active on med list        Passed - Patient is age 18 or older        Passed - No active pregnancy on record        Passed - No positive pregnancy test in past 12 months

## 2019-06-03 RX ORDER — LOVASTATIN 20 MG
TABLET ORAL
Qty: 90 TABLET | Refills: 0 | OUTPATIENT
Start: 2019-06-03

## 2019-06-03 RX ORDER — ATENOLOL 25 MG/1
TABLET ORAL
Qty: 90 TABLET | Refills: 0 | OUTPATIENT
Start: 2019-06-03

## 2019-06-04 ENCOUNTER — MYC MEDICAL ADVICE (OUTPATIENT)
Dept: FAMILY MEDICINE | Facility: CLINIC | Age: 74
End: 2019-06-04

## 2019-06-04 DIAGNOSIS — E78.5 HYPERLIPIDEMIA, UNSPECIFIED HYPERLIPIDEMIA TYPE: ICD-10-CM

## 2019-06-04 DIAGNOSIS — Z00.00 ROUTINE GENERAL MEDICAL EXAMINATION AT A HEALTH CARE FACILITY: ICD-10-CM

## 2019-06-04 NOTE — TELEPHONE ENCOUNTER
"Requested Prescriptions   Pending Prescriptions Disp Refills     lovastatin (MEVACOR) 20 MG tablet [Pharmacy Med Name: LOVASTATIN 20MG TABLETS] 90 tablet 0     Sig: TAKE 1 TABLET BY MOUTH AT BEDTIME  Last Written Prescription Date:  5/27/2019  Last Fill Quantity: 90 tablet,  # refills: 0   Last Office Visit: 4/1/2019   Future Office Visit:    Next 5 appointments (look out 90 days)    Guru 10, 2019  9:20 AM CDT  PHYSICAL with Jessica Cheema PA-C  [facility] ([facility] [address] [place] [id number] [phone]              Statins Protocol Passed - 6/4/2019 10:53 AM        Passed - LDL on file in past 12 months     Recent Labs   Lab Test 06/07/18  0855   *           Passed - No abnormal creatine kinase in past 12 months     No lab results found.           Passed - Recent (12 mo) or future (30 days) visit within the authorizing provider's specialty     Patient had office visit in the last 12 months or has a visit in the next 30 days with authorizing provider or within the authorizing provider's specialty.  See \"Patient Info\" tab in inbasket, or \"Choose Columns\" in Meds & Orders section of the refill encounter.            Passed - Medication is active on med list        Passed - Patient is age 18 or older        Passed - No active pregnancy on record        Passed - No positive pregnancy test in past 12 months          "

## 2019-06-05 RX ORDER — LOVASTATIN 20 MG
TABLET ORAL
Qty: 30 TABLET | Refills: 0 | Status: SHIPPED | OUTPATIENT
Start: 2019-06-05 | End: 2019-06-10

## 2019-06-05 NOTE — TELEPHONE ENCOUNTER
Medication is being filled for 1 time refill only due to:  Patient needs to be seen because due for physical. Has upcoming appt scheduled.     Jessica Haas RN  [facility]

## 2019-06-10 ENCOUNTER — OFFICE VISIT (OUTPATIENT)
Dept: FAMILY MEDICINE | Facility: CLINIC | Age: 74
End: 2019-06-10
Payer: COMMERCIAL

## 2019-06-10 VITALS
HEIGHT: 65 IN | HEART RATE: 60 BPM | BODY MASS INDEX: 26.66 KG/M2 | WEIGHT: 160 LBS | SYSTOLIC BLOOD PRESSURE: 113 MMHG | TEMPERATURE: 98.1 F | OXYGEN SATURATION: 98 % | DIASTOLIC BLOOD PRESSURE: 75 MMHG | RESPIRATION RATE: 18 BRPM

## 2019-06-10 DIAGNOSIS — Z13.0 SCREENING, ANEMIA, DEFICIENCY, IRON: ICD-10-CM

## 2019-06-10 DIAGNOSIS — E78.5 HYPERLIPIDEMIA, UNSPECIFIED HYPERLIPIDEMIA TYPE: ICD-10-CM

## 2019-06-10 DIAGNOSIS — Z00.00 ROUTINE GENERAL MEDICAL EXAMINATION AT A HEALTH CARE FACILITY: Primary | ICD-10-CM

## 2019-06-10 PROCEDURE — 99397 PER PM REEVAL EST PAT 65+ YR: CPT | Performed by: PHYSICIAN ASSISTANT

## 2019-06-10 RX ORDER — LOVASTATIN 20 MG
20 TABLET ORAL AT BEDTIME
Qty: 90 TABLET | Refills: 3 | Status: SHIPPED | OUTPATIENT
Start: 2019-06-10 | End: 2020-03-27

## 2019-06-10 ASSESSMENT — ENCOUNTER SYMPTOMS
ABDOMINAL PAIN: 0
SHORTNESS OF BREATH: 0
JOINT SWELLING: 0
WEAKNESS: 0
HEARTBURN: 0
ARTHRALGIAS: 1
CHILLS: 0
NERVOUS/ANXIOUS: 0
FREQUENCY: 1
PARESTHESIAS: 0
COUGH: 0
DYSURIA: 0
NAUSEA: 0
HEADACHES: 0
HEMATURIA: 0
PALPITATIONS: 0
CONSTIPATION: 0
BREAST MASS: 0
MYALGIAS: 0
DIARRHEA: 0
DIZZINESS: 1
EYE PAIN: 0
HEMATOCHEZIA: 0
SORE THROAT: 0
FEVER: 0

## 2019-06-10 ASSESSMENT — MIFFLIN-ST. JEOR: SCORE: 1232.67

## 2019-06-10 ASSESSMENT — ACTIVITIES OF DAILY LIVING (ADL): CURRENT_FUNCTION: NO ASSISTANCE NEEDED

## 2019-06-10 NOTE — PATIENT INSTRUCTIONS
Patient Education   Personalized Prevention Plan  You are due for the preventive services outlined below.  Your care team is available to assist you in scheduling these services.  If you have already completed any of these items, please share that information with your care team to update in your medical record.  Health Maintenance Due   Topic Date Due     Osteoporosis Screening  09/10/2017     PHQ-2  01/01/2019     Annual Wellness Visit  06/07/2019

## 2019-06-10 NOTE — PROGRESS NOTES
"SUBJECTIVE:   Lidia Jenkins is a 73 year old female who presents for Preventive Visit.      Are you in the first 12 months of your Medicare coverage?  No    Healthy Habits:     In general, how would you rate your overall health?  Excellent    Frequency of exercise:  4-5 days/week    Duration of exercise:  15-30 minutes    Do you usually eat at least 4 servings of fruit and vegetables a day, include whole grains    & fiber and avoid regularly eating high fat or \"junk\" foods?  Yes    Taking medications regularly:  Yes    Medication side effects:  None    Ability to successfully perform activities of daily living:  No assistance needed    Home Safety:  No safety concerns identified    Hearing Impairment:  Difficulty understanding soft or whispered speech    In the past 6 months, have you been bothered by leaking of urine?  No    In general, how would you rate your overall mental or emotional health?  Excellent      PHQ-2 Total Score: 0    Additional concerns today:  No    Do you feel safe in your environment? Yes    Do you have a Health Care Directive? Yes: Advance Directive has been received and scanned.      Fall risk  Fallen 2 or more times in the past year?: No  Any fall with injury in the past year?: No    Cognitive Screening   1) Repeat 3 items (Leader, Season, Table)    2) Clock draw: NORMAL  3) 3 item recall: Recalls 3 objects  Results: 3 items recalled: COGNITIVE IMPAIRMENT LESS LIKELY    Mini-CogTM Copyright MAXIME Spencer. Licensed by the author for use in [facility]; reprinted with permission (maximiliano@.[facility]). All rights reserved.      Do you have sleep apnea, excessive snoring or daytime drowsiness?: no    Reviewed and updated as needed this visit by clinical staff  Tobacco  Allergies  Meds  Problems  Med Hx  Surg Hx  Fam Hx  Soc Hx          Reviewed and updated as needed this visit by Provider  Tobacco  Allergies  Meds  Problems  Med Hx  Surg Hx  Fam Hx        Social History "     Tobacco Use     Smoking status: Never Smoker     Smokeless tobacco: Never Used   Substance Use Topics     Alcohol use: Yes     Alcohol/week: 2.4 oz     Comment: 1-4 glasses of wine a week     If you drink alcohol do you typically have >3 drinks per day or >7 drinks per week? No    Alcohol Use 6/10/2019   Prescreen: >3 drinks/day or >7 drinks/week? No   Prescreen: >3 drinks/day or >7 drinks/week? -   No flowsheet data found.    Current providers sharing in care for this patient include:  Patient Care Team:  Jessica Cheema PA-C as PCP - General (Physician Assistant)  Ana Pantoja MD as MD (Family Medicine - Sports Medicine)  Mary Linder MD as Assigned PCP    The following health maintenance items are reviewed in Epic and correct as of today:  Health Maintenance   Topic Date Due     DEXA  09/10/2017     PHQ-2  01/01/2019     MEDICARE ANNUAL WELLNESS VISIT  06/07/2019     MAMMO SCREENING  06/11/2020     ADVANCED DIRECTIVE PLANNING  06/27/2021     COLONOSCOPY  06/15/2022     DTAP/TDAP/TD IMMUNIZATION (2 - Td) 01/01/2023     LIPID  06/07/2023     HEPATITIS C SCREENING  Completed     INFLUENZA VACCINE  Completed     ZOSTER IMMUNIZATION  Completed     IPV IMMUNIZATION  Aged Out     MENINGITIS IMMUNIZATION  Aged Out     Labs reviewed in EPIC  BP Readings from Last 3 Encounters:   06/10/19 113/75   04/01/19 120/88   11/01/18 122/78    Wt Readings from Last 3 Encounters:   06/10/19 72.6 kg (160 lb)   06/07/18 70.8 kg (156 lb)   03/29/18 71.7 kg (158 lb)                  Patient Active Problem List   Diagnosis     Hypertension goal BP (blood pressure) < 140/90     History of basal cell carcinoma     Advance care planning     Past Surgical History:   Procedure Laterality Date     ABDOMEN SURGERY  1987     COLONOSCOPY  2012       Social History     Tobacco Use     Smoking status: Never Smoker     Smokeless tobacco: Never Used   Substance Use Topics     Alcohol use: Yes      Alcohol/week: 2.4 oz     Comment: 1-4 glasses of wine a week     Family History   Problem Relation Age of Onset     Breast Cancer Mother      Hypertension Mother      Hyperlipidemia Mother      Depression Mother      Hypertension Brother      Hyperlipidemia Brother      Hypertension Sister      Hyperlipidemia Sister      Coronary Artery Disease Father         MI     Hypertension Father      Hyperlipidemia Father      Substance Abuse Father      Coronary Artery Disease Brother      Hypertension Brother      Substance Abuse Brother      Hyperlipidemia Brother      Hypertension Sister      Hyperlipidemia Sister      Depression Sister      Substance Abuse Sister      Other Cancer No family hx of          Current Outpatient Medications   Medication Sig Dispense Refill     atenolol (TENORMIN) 25 MG tablet TAKE 1 TABLET BY MOUTH DAILY 90 tablet 3     cholecalciferol (VITAMIN D) 1000 UNIT tablet Take 2 tablets (2,000 Units) by mouth daily 100 tablet 3     lovastatin (MEVACOR) 20 MG tablet Take 1 tablet (20 mg) by mouth At Bedtime 90 tablet 3     No Known Allergies  Recent Labs   Lab Test 06/07/18  0855 07/05/17  0851 06/06/16  0951  12/29/14   * 109* 113*   < > 117   HDL 68 66 68   < > 60   TRIG 72 63 57   < > 118   ALT 31 24 27   < >  --    CR 0.79 0.80 0.76   < >  --    GFRESTIMATED 72 70 75   < >  --    GFRESTBLACK 87 85 >90   GFR Calc     < >  --    POTASSIUM 4.7 4.0 3.9   < >  --    TSH  --  2.40  --   --  1.71    < > = values in this interval not displayed.        Review of Systems   Constitutional: Negative for chills and fever.   HENT: Positive for hearing loss. Negative for congestion, ear pain and sore throat.    Eyes: Negative for pain and visual disturbance.   Respiratory: Negative for cough and shortness of breath.    Cardiovascular: Negative for chest pain, palpitations and peripheral edema.   Gastrointestinal: Negative for abdominal pain, constipation, diarrhea, heartburn, hematochezia  "and nausea.   Breasts:  Negative for tenderness, breast mass and discharge.   Genitourinary: Positive for frequency. Negative for dysuria, genital sores, hematuria, pelvic pain, urgency, vaginal bleeding and vaginal discharge.   Musculoskeletal: Positive for arthralgias. Negative for joint swelling and myalgias.   Skin: Negative for rash.   Neurological: Positive for dizziness. Negative for weakness, headaches and paresthesias.   Psychiatric/Behavioral: Negative for mood changes. The patient is not nervous/anxious.        OBJECTIVE:   /75 (BP Location: Left arm, Patient Position: Sitting, Cuff Size: Adult Regular)   Pulse 60   Temp 98.1  F (36.7  C) (Oral)   Resp 18   Ht 1.645 m (5' 4.75\")   Wt 72.6 kg (160 lb)   SpO2 98%   BMI 26.83 kg/m   Estimated body mass index is 26.83 kg/m  as calculated from the following:    Height as of this encounter: 1.645 m (5' 4.75\").    Weight as of this encounter: 72.6 kg (160 lb).  Physical Exam  GENERAL: healthy, alert and no distress  EYES: Eyes grossly normal to inspection, PERRL and conjunctivae and sclerae normal  HENT: ear canals and TM's normal, nose and mouth without ulcers or lesions  NECK: no adenopathy, no asymmetry, masses, or scars and thyroid normal to palpation  RESP: lungs clear to auscultation - no rales, rhonchi or wheezes  BREAST: normal without masses, tenderness or nipple discharge and no palpable axillary masses or adenopathy  CV: regular rate and rhythm, normal S1 S2, no S3 or S4, no murmur, click or rub, no peripheral edema and peripheral pulses strong  ABDOMEN: soft, nontender, no hepatosplenomegaly, no masses and bowel sounds normal  MS: no gross musculoskeletal defects noted, no edema  SKIN: no suspicious lesions or rashes  NEURO: Normal strength and tone, mentation intact and speech normal  PSYCH: mentation appears normal, affect normal/bright    Diagnostic Test Results:  Labs reviewed in Epic    ASSESSMENT / PLAN:       ICD-10-CM    1. " "Routine general medical examination at a health care facility Z00.00 lovastatin (MEVACOR) 20 MG tablet     Ferritin     TSH with free T4 reflex   2. Hyperlipidemia, unspecified hyperlipidemia type E78.5 lovastatin (MEVACOR) 20 MG tablet     Comprehensive metabolic panel     Lipid panel reflex to direct LDL Fasting   3. Screening, anemia, deficiency, iron Z13.0 CBC with platelets differential       End of Life Planning:  Patient currently has an advanced directive: Yes.  Practitioner is supportive of decision.    COUNSELING:  Reviewed preventive health counseling, as reflected in patient instructions       Regular exercise       Healthy diet/nutrition       Vision screening    Estimated body mass index is 26.83 kg/m  as calculated from the following:    Height as of this encounter: 1.645 m (5' 4.75\").    Weight as of this encounter: 72.6 kg (160 lb).     reports that she has never smoked. She has never used smokeless tobacco.      Appropriate preventive services were discussed with this patient, including applicable screening as appropriate for cardiovascular disease, diabetes, osteopenia/osteoporosis, and glaucoma.  As appropriate for age/gender, discussed screening for colorectal cancer, prostate cancer, breast cancer, and cervical cancer. Checklist reviewing preventive services available has been given to the patient.    Reviewed patients plan of care and provided an AVS. The Basic Care Plan (routine screening as documented in Health Maintenance) for Lidia meets the Care Plan requirement. This Care Plan has been established and reviewed with the Patient.    Counseling Resources:  ATP IV Guidelines  Pooled Cohorts Equation Calculator  Breast Cancer Risk Calculator  FRAX Risk Assessment  ICSI Preventive Guidelines  Dietary Guidelines for Americans, 2010  [employer]'s MyPlate  ASA Prophylaxis  Lung CA Screening    Jessica Cheema PA-C  [facility] [place]    Identified Health Risks:  "

## 2019-06-11 DIAGNOSIS — Z00.00 ROUTINE GENERAL MEDICAL EXAMINATION AT A HEALTH CARE FACILITY: ICD-10-CM

## 2019-06-11 DIAGNOSIS — E78.5 HYPERLIPIDEMIA, UNSPECIFIED HYPERLIPIDEMIA TYPE: ICD-10-CM

## 2019-06-11 DIAGNOSIS — Z13.0 SCREENING, ANEMIA, DEFICIENCY, IRON: ICD-10-CM

## 2019-06-11 LAB
ALBUMIN SERPL-MCNC: 3.9 G/DL (ref 3.4–5)
ALP SERPL-CCNC: 54 U/L (ref 40–150)
ALT SERPL W P-5'-P-CCNC: 27 U/L (ref 0–50)
ANION GAP SERPL CALCULATED.3IONS-SCNC: 7 MMOL/L (ref 3–14)
AST SERPL W P-5'-P-CCNC: 29 U/L (ref 0–45)
BASOPHILS # BLD AUTO: 0 10E9/L (ref 0–0.2)
BASOPHILS NFR BLD AUTO: 0.6 %
BILIRUB SERPL-MCNC: 0.5 MG/DL (ref 0.2–1.3)
BUN SERPL-MCNC: 20 MG/DL (ref 7–30)
CALCIUM SERPL-MCNC: 8.8 MG/DL (ref 8.5–10.1)
CHLORIDE SERPL-SCNC: 111 MMOL/L (ref 94–109)
CHOLEST SERPL-MCNC: 191 MG/DL
CO2 SERPL-SCNC: 26 MMOL/L (ref 20–32)
CREAT SERPL-MCNC: 0.82 MG/DL (ref 0.52–1.04)
DIFFERENTIAL METHOD BLD: ABNORMAL
EOSINOPHIL # BLD AUTO: 0.1 10E9/L (ref 0–0.7)
EOSINOPHIL NFR BLD AUTO: 2.8 %
ERYTHROCYTE [DISTWIDTH] IN BLOOD BY AUTOMATED COUNT: 13.7 % (ref 10–15)
FERRITIN SERPL-MCNC: 99 NG/ML (ref 8–252)
GFR SERPL CREATININE-BSD FRML MDRD: 72 ML/MIN/{1.73_M2}
GLUCOSE SERPL-MCNC: 89 MG/DL (ref 70–99)
HCT VFR BLD AUTO: 43.7 % (ref 35–47)
HDLC SERPL-MCNC: 65 MG/DL
HGB BLD-MCNC: 14.5 G/DL (ref 11.7–15.7)
LDLC SERPL CALC-MCNC: 112 MG/DL
LYMPHOCYTES # BLD AUTO: 1.1 10E9/L (ref 0.8–5.3)
LYMPHOCYTES NFR BLD AUTO: 31.8 %
MCH RBC QN AUTO: 30.3 PG (ref 26.5–33)
MCHC RBC AUTO-ENTMCNC: 33.2 G/DL (ref 31.5–36.5)
MCV RBC AUTO: 91 FL (ref 78–100)
MONOCYTES # BLD AUTO: 0.4 10E9/L (ref 0–1.3)
MONOCYTES NFR BLD AUTO: 11.5 %
NEUTROPHILS # BLD AUTO: 1.9 10E9/L (ref 1.6–8.3)
NEUTROPHILS NFR BLD AUTO: 53.3 %
NONHDLC SERPL-MCNC: 126 MG/DL
PLATELET # BLD AUTO: 226 10E9/L (ref 150–450)
POTASSIUM SERPL-SCNC: 4.7 MMOL/L (ref 3.4–5.3)
PROT SERPL-MCNC: 7.2 G/DL (ref 6.8–8.8)
RBC # BLD AUTO: 4.79 10E12/L (ref 3.8–5.2)
SODIUM SERPL-SCNC: 144 MMOL/L (ref 133–144)
TRIGL SERPL-MCNC: 71 MG/DL
TSH SERPL DL<=0.005 MIU/L-ACNC: 2.93 MU/L (ref 0.4–4)
WBC # BLD AUTO: 3.6 10E9/L (ref 4–11)

## 2019-06-11 PROCEDURE — 85025 COMPLETE CBC W/AUTO DIFF WBC: CPT | Performed by: PHYSICIAN ASSISTANT

## 2019-06-11 PROCEDURE — 82728 ASSAY OF FERRITIN: CPT | Performed by: PHYSICIAN ASSISTANT

## 2019-06-11 PROCEDURE — 84443 ASSAY THYROID STIM HORMONE: CPT | Performed by: PHYSICIAN ASSISTANT

## 2019-06-11 PROCEDURE — 80053 COMPREHEN METABOLIC PANEL: CPT | Performed by: PHYSICIAN ASSISTANT

## 2019-06-11 PROCEDURE — 80061 LIPID PANEL: CPT | Performed by: PHYSICIAN ASSISTANT

## 2019-06-11 PROCEDURE — 36415 COLL VENOUS BLD VENIPUNCTURE: CPT | Performed by: PHYSICIAN ASSISTANT

## 2019-06-11 NOTE — RESULT ENCOUNTER NOTE
"Coco Murillo  Your attached labs are normal. Keep up the good work!  Please contact the office with any questions or concerns.    Jessica Hawley \"Taz\" JUAN ANTONIO Cheema    "

## 2019-06-24 ENCOUNTER — ANCILLARY PROCEDURE (OUTPATIENT)
Dept: MAMMOGRAPHY | Facility: CLINIC | Age: 74
End: 2019-06-24
Attending: PHYSICIAN ASSISTANT
Payer: COMMERCIAL

## 2019-06-24 DIAGNOSIS — Z12.31 VISIT FOR SCREENING MAMMOGRAM: ICD-10-CM

## 2019-06-24 PROCEDURE — 77067 SCR MAMMO BI INCL CAD: CPT

## 2019-07-09 ENCOUNTER — MYC MEDICAL ADVICE (OUTPATIENT)
Dept: FAMILY MEDICINE | Facility: CLINIC | Age: 74
End: 2019-07-09

## 2019-07-09 DIAGNOSIS — M65.30 TRIGGER FINGER, UNSPECIFIED FINGER, UNSPECIFIED LATERALITY: Primary | ICD-10-CM

## 2019-07-09 NOTE — TELEPHONE ENCOUNTER
RECORDS RECEIVED FROM: Trigger finger lt hand middle finger, appt per pt. Dr. Lloyd referring, records are in Epic   DATE RECEIVED: Jul 10, 2019    NOTES STATUS DETAILS   OFFICE NOTE from referring provider Internal 07/09/19 Penemarie K Murphyt message   OFFICE NOTE from other specialist N/A    DISCHARGE SUMMARY from hospital N/A    DISCHARGE REPORT from the ER N/A    OPERATIVE REPORT N/A    MEDICATION LIST Internal    IMPLANT RECORD/STICKER N/A    LABS     CBC/DIFF N/A    CULTURES N/A    INJECTIONS DONE IN RADIOLOGY N/A    MRI N/A    CT SCAN N/A    XRAYS (IMAGES & REPORTS) N/A    TUMOR     PATHOLOGY  Slides & report N/A

## 2019-07-09 NOTE — TELEPHONE ENCOUNTER
Covering providers-Please review and advise if there is a hand surgeon you recommend.    Ortho  referral pended for hand surgeon.    Thank you!  OLAMIDE LiraN, RN

## 2019-07-10 ENCOUNTER — OFFICE VISIT (OUTPATIENT)
Dept: ORTHOPEDICS | Facility: CLINIC | Age: 74
End: 2019-07-10
Payer: COMMERCIAL

## 2019-07-10 ENCOUNTER — PRE VISIT (OUTPATIENT)
Dept: ORTHOPEDICS | Facility: CLINIC | Age: 74
End: 2019-07-10

## 2019-07-10 DIAGNOSIS — M65.322 TRIGGER INDEX FINGER OF LEFT HAND: Primary | ICD-10-CM

## 2019-07-10 DIAGNOSIS — M65.332 ACQUIRED TRIGGER FINGER OF LEFT MIDDLE FINGER: ICD-10-CM

## 2019-07-10 NOTE — NURSING NOTE
Teaching Flowsheet   Relevant Diagnosis: Left middle finger trigger finger  Teaching Topic: Left middle finger trigger finger release  BMI 26.83  CSC with Dr Castro Navarro under local anesthetic only.  Consent obtained.     Person(s) involved in teaching:   Patient     Motivation Level:  Asks Questions: Yes  Eager to Learn: Yes  Cooperative: Yes  Receptive (willing/able to accept information): Yes  Any cultural factors/Mandaeism beliefs that may influence understanding or compliance? No       Patient demonstrates understanding of the following:  Reason for the appointment, diagnosis and treatment plan: Yes  Knowledge of proper use of medications and conditions for which they are ordered (with special attention to potential side effects or drug interactions): Yes, she will avoid her normal Aleve for 7 days prior to surgery and try to use Tylenol only for pain.   Which situations necessitate calling provider and whom to contact: Yes       Teaching Concerns Addressed:   Proper use and care of her surgial site post op, she will need to avoid swimming or fully submerging or soaking her left hand until her sutures are removed and her skin has fully healed over.  She was concerned re: swimming with her grandchildren.  She will keep dressing and sutures clean and dry.    Nutritional needs and diet plan: Yes  Pain management techniques: Yes  Wound Care: Yes  How and/when to access community resources: Yes     Instructional Materials Used/Given: preoperative teaching packet and antibacterial soap     Time spent with patient: 15 minutes  Claudette Rhodes RN

## 2019-07-10 NOTE — PROGRESS NOTES
[facility]  Orthopedics  Castro Navarro MD  07/10/2019     Name: Lidia Jenkins  MRN: [id number]  Age: 73 year old  : 1945  Referring provider: Piedad Lloyd     Chief Complaint: Consult For (Left middle finger trigger finger)     Date of Injury: ~1 year ago    History of Present Illness:   Lidia Jenkins is a 73 year old, right handed female who presents today for evaluation of a left long finger trigger finger.  This been present for over 1 year.  It is not painful.  The finger does get stuck and requires manual release.  She does not have history of other trigger fingers.  She thinks it is progressively getting worse.  No previous trauma or surgeries to this hand.  Denies any tingling or numbness.  Patient is not diabetic.  She is interested in definitive intervention.  She does not have any tenderness in any of her other fingers.    Review of Systems:   A 10-point review of systems was obtained and is negative except for as noted in the HPI.     Medications:     Current Outpatient Medications:      atenolol (TENORMIN) 25 MG tablet, TAKE 1 TABLET BY MOUTH DAILY, Disp: 90 tablet, Rfl: 3     cholecalciferol (VITAMIN D) 1000 UNIT tablet, Take 2 tablets (2,000 Units) by mouth daily, Disp: 100 tablet, Rfl: 3     lovastatin (MEVACOR) 20 MG tablet, Take 1 tablet (20 mg) by mouth At Bedtime, Disp: 90 tablet, Rfl: 3    Allergies:  Patient has no known allergies.     Past Medical History:  Past Medical History:   Diagnosis Date     Arthritis      Basal cell carcinoma      Hyperlipidaemia LDL goal < 100      Hypertension goal BP (blood pressure) < 140/90      Psoriasis        Past Surgical History:  Past Surgical History:   Procedure Laterality Date     ABDOMEN SURGERY       COLONOSCOPY          Social History:  Patient lives locally she is retired. She denies tobacco use and admits to occasional alcohol use.     Social History     Socioeconomic History     Marital status: Single     Spouse name: Not  on file     Number of children: Not on file     Years of education: Not on file     Highest education level: Not on file   Occupational History     Not on file   Social Needs     Financial resource strain: Not on file     Food insecurity:     Worry: Not on file     Inability: Not on file     Transportation needs:     Medical: Not on file     Non-medical: Not on file   Tobacco Use     Smoking status: Never Smoker     Smokeless tobacco: Never Used   Substance and Sexual Activity     Alcohol use: Yes     Alcohol/week: 2.4 oz     Comment: 1-4 glasses of wine a week     Drug use: No     Sexual activity: Not Currently   Lifestyle     Physical activity:     Days per week: Not on file     Minutes per session: Not on file     Stress: Not on file   Relationships     Social connections:     Talks on phone: Not on file     Gets together: Not on file     Attends Catholic service: Not on file     Active member of club or organization: Not on file     Attends meetings of clubs or organizations: Not on file     Relationship status: Not on file     Intimate partner violence:     Fear of current or ex partner: Not on file     Emotionally abused: Not on file     Physically abused: Not on file     Forced sexual activity: Not on file   Other Topics Concern     Parent/sibling w/ CABG, MI or angioplasty before 65F 55M? Yes     Comment: father, brother   Social History Narrative     Not on file        Family History:    Family History   Problem Relation Age of Onset     Breast Cancer Mother      Hypertension Mother      Hyperlipidemia Mother      Depression Mother      Hypertension Brother      Hyperlipidemia Brother      Hypertension Sister      Hyperlipidemia Sister      Coronary Artery Disease Father         MI     Hypertension Father      Hyperlipidemia Father      Substance Abuse Father      Coronary Artery Disease Brother      Hypertension Brother      Substance Abuse Brother      Hyperlipidemia Brother      Hypertension Sister       Hyperlipidemia Sister      Depression Sister      Substance Abuse Sister      Other Cancer No family hx of        Physical Examination:  not currently breastfeeding.  General: No acute distress  Respiratory nonlabored breathing  Cardiovascular: Warm and well-perfused extremities  Musculoskeletal: Focused exam of the left hand reveals a normal-appearing hand.  Skin is intact without any scars or lesions.  Nails are intact.  Normal hydration pattern.  Nontender palpation over the metacarpal heads.  Full symmetric range of motion.  Obvious triggering of the left long finger.  Palpable nodule in the flexor sheath.  Fires EPL occasionally, FPL, and is interossei.  Sensory intact median, ulnar, and radial nerves.  Brisk capillary refill.    Imaging:   None    Electrodiagnostic Studies:   None    Assessment:   73 year old, right handed female with left long finger trigger finger.    Plan:   Discussed operative and nonoperative management.  After the patient continued monitoring, corticosteroid injection, or surgical release.  Answered all questions discussed the risk benefits and alternatives to each.  She is interested in definitive management.  She would like to proceed with an A1 pulley release.  This to be done under local anesthesia same day surgery.  She would like to do this as soon as possible.  We will get her signed up for surgery have her meet with our surgery scheduler and schedule.  Possibly can complete this tomorrow.        Patient was seen and discussed with Dr. Navarro she agrees with the above-stated plan    Gonzalo Chino MD  Orthopedic Surgery, PGY-4  Pager: [phone]  10:18 AM  July 10, 2019'      I, Castro Navarro, saw and evaluated this patient with the resident and agree with the resident s findings and plan of care as documented in the resident s note.  I discussed the treatment options with the patient, namely continued observation, repeat injection, and trigger release. . I discussed the  risks of surgery with her including infection, bleeding, pain, scarring, damage to nerves, blood vessels, or other nearby structures, recurrence, prolonged stiffness and/or discomfort, tendon dysfunction, and need for further surgery.  The patient expressed understanding and informed consent was obtained for a left middle finger trigger release.  This will be done under local only anesthesia.  The office will call the patient to schedule surgery.

## 2019-07-10 NOTE — NURSING NOTE
Reason For Visit:   Chief Complaint   Patient presents with     Consult For     Left middle finger trigger finger       Primary MD: Jessica Cheema    ?  No    Age: 73 year old    Date of injury: Started triggering over a year ago  Type of injury: Left middle trigger finger.  Date of surgery: NA  Type of surgery: NA.  Smoker: No  Request smoking cessation information: No      There were no vitals taken for this visit.      Pain Assessment  Patient Currently in Pain: Denies(Just locking/tiggering)               QuickDASH Assessment  No flowsheet data found.       No Known Allergies    Carole Charlton ATC

## 2019-07-10 NOTE — LETTER
7/10/2019       RE: Lidia Jenkins  [address] [place], [place] [id number]     Dear Colleague,    Thank you for referring your patient, Lidia Jenkins, to the [facility] at [facility]. Please see a copy of my visit note below.    [facility]  Orthopedics  Castro Navarro MD  07/10/2019     Name: Lidia Jenkins  MRN: [id number]  Age: 73 year old  : 1945  Referring provider: Piedad Lloyd     Chief Complaint: Consult For (Left middle finger trigger finger)     Date of Injury: ~1 year ago    History of Present Illness:   Lidia Jenkins is a 73 year old, right handed female who presents today for evaluation of a left long finger trigger finger.  This been present for over 1 year.  It is not painful.  The finger does get stuck and requires manual release.  She does not have history of other trigger fingers.  She thinks it is progressively getting worse.  No previous trauma or surgeries to this hand.  Denies any tingling or numbness.  Patient is not diabetic.  She is interested in definitive intervention.  She does not have any tenderness in any of her other fingers.    Review of Systems:   A 10-point review of systems was obtained and is negative except for as noted in the HPI.     Medications:     Current Outpatient Medications:      atenolol (TENORMIN) 25 MG tablet, TAKE 1 TABLET BY MOUTH DAILY, Disp: 90 tablet, Rfl: 3     cholecalciferol (VITAMIN D) 1000 UNIT tablet, Take 2 tablets (2,000 Units) by mouth daily, Disp: 100 tablet, Rfl: 3     lovastatin (MEVACOR) 20 MG tablet, Take 1 tablet (20 mg) by mouth At Bedtime, Disp: 90 tablet, Rfl: 3    Allergies:  Patient has no known allergies.     Past Medical History:  Past Medical History:   Diagnosis Date     Arthritis      Basal cell carcinoma      Hyperlipidaemia LDL goal < 100      Hypertension goal BP (blood pressure) < 140/90      Psoriasis        Past Surgical History:  Past Surgical History:    Procedure Laterality Date     ABDOMEN SURGERY  1987     COLONOSCOPY  2012        Social History:  Patient lives locally she is retired. She denies tobacco use and admits to occasional alcohol use.     Social History     Socioeconomic History     Marital status: Single     Spouse name: Not on file     Number of children: Not on file     Years of education: Not on file     Highest education level: Not on file   Occupational History     Not on file   Social Needs     Financial resource strain: Not on file     Food insecurity:     Worry: Not on file     Inability: Not on file     Transportation needs:     Medical: Not on file     Non-medical: Not on file   Tobacco Use     Smoking status: Never Smoker     Smokeless tobacco: Never Used   Substance and Sexual Activity     Alcohol use: Yes     Alcohol/week: 2.4 oz     Comment: 1-4 glasses of wine a week     Drug use: No     Sexual activity: Not Currently   Lifestyle     Physical activity:     Days per week: Not on file     Minutes per session: Not on file     Stress: Not on file   Relationships     Social connections:     Talks on phone: Not on file     Gets together: Not on file     Attends [employer] service: Not on file     Active member of club or organization: Not on file     Attends meetings of clubs or organizations: Not on file     Relationship status: Not on file     Intimate partner violence:     Fear of current or ex partner: Not on file     Emotionally abused: Not on file     Physically abused: Not on file     Forced sexual activity: Not on file   Other Topics Concern     Parent/sibling w/ CABG, MI or angioplasty before 65F 55M? Yes     Comment: father, brother   Social History Narrative     Not on file        Family History:    Family History   Problem Relation Age of Onset     Breast Cancer Mother      Hypertension Mother      Hyperlipidemia Mother      Depression Mother      Hypertension Brother      Hyperlipidemia Brother      Hypertension Sister       Hyperlipidemia Sister      Coronary Artery Disease Father         MI     Hypertension Father      Hyperlipidemia Father      Substance Abuse Father      Coronary Artery Disease Brother      Hypertension Brother      Substance Abuse Brother      Hyperlipidemia Brother      Hypertension Sister      Hyperlipidemia Sister      Depression Sister      Substance Abuse Sister      Other Cancer No family hx of        Physical Examination:  not currently breastfeeding.  General: No acute distress  Respiratory nonlabored breathing  Cardiovascular: Warm and well-perfused extremities  Musculoskeletal: Focused exam of the left hand reveals a normal-appearing hand.  Skin is intact without any scars or lesions.  Nails are intact.  Normal hydration pattern.  Nontender palpation over the metacarpal heads.  Full symmetric range of motion.  Obvious triggering of the left long finger.  Palpable nodule in the flexor sheath.  Fires EPL occasionally, FPL, and is interossei.  Sensory intact median, ulnar, and radial nerves.  Brisk capillary refill.    Imaging:   None    Electrodiagnostic Studies:   None    Assessment:   73 year old, right handed female with left long finger trigger finger.    Plan:   Discussed operative and nonoperative management.  After the patient continued monitoring, corticosteroid injection, or surgical release.  Answered all questions discussed the risk benefits and alternatives to each.  She is interested in definitive management.  She would like to proceed with an A1 pulley release.  This to be done under local anesthesia same day surgery.  She would like to do this as soon as possible.  We will get her signed up for surgery have her meet with our surgery scheduler and schedule.  Possibly can complete this tomorrow.        Patient was seen and discussed with Dr. Navarro she agrees with the above-stated plan    Gonzalo Chino MD  Orthopedic Surgery, PGY-4  Pager: [phone]  10:18 AM  July 10, 2019'      Castro LANCASTER  BRYCE Navarro, saw and evaluated this patient with the resident and agree with the resident s findings and plan of care as documented in the resident s note.  I discussed the treatment options with the patient, namely continued observation, repeat injection, and trigger release. . I discussed the risks of surgery with her including infection, bleeding, pain, scarring, damage to nerves, blood vessels, or other nearby structures, recurrence, prolonged stiffness and/or discomfort, tendon dysfunction, and need for further surgery.  The patient expressed understanding and informed consent was obtained for a left middle finger trigger release.  This will be done under local only anesthesia.  The office will call the patient to schedule surgery.        Again, thank you for allowing me to participate in the care of your patient.      Sincerely,    Castro Navarro MD

## 2019-07-17 ENCOUNTER — TELEPHONE (OUTPATIENT)
Dept: ORTHOPEDICS | Facility: CLINIC | Age: 74
End: 2019-07-17

## 2019-07-17 NOTE — TELEPHONE ENCOUNTER
Attempted to reach out to patient to assist with scheduling surgery with Dr. Navarro. Left detailed message that first available would be Thursday 8/1. Left best call back number of [phone]

## 2019-07-23 NOTE — TELEPHONE ENCOUNTER
2nd attempt to reach out to patient to discuss rescheduling surgery with Dr. Navarro. Left best call back number of [phone]

## 2019-08-02 NOTE — TELEPHONE ENCOUNTER
Return patients phone call to assist with scheduling surgery with Dr. Navarro anytime after 8/13. Left detailed message that Dr. Navarro would be available on 8/15, 8/29 or 9/12. Left best call back number of [phone]

## 2019-08-05 NOTE — TELEPHONE ENCOUNTER
Patient is scheduled for surgery with Dr. Navarro      Spoke or left message with: Evelyn with Lidia    Date of Surgery: 8/15/19    Location: [facility]    Informed patient they will need an adult  Yes    H&P: Scheduled with N/A Local only    Additional imaging/appointments:N/A    Surgery packet: Given in clinic    Additional comments: Patient will await pre admission phone call 1-2 days prior to surgery for arrival time

## 2019-08-14 RX ORDER — BUPIVACAINE HYDROCHLORIDE 5 MG/ML
5 INJECTION, SOLUTION EPIDURAL; INTRACAUDAL ONCE
Status: CANCELLED | OUTPATIENT
Start: 2019-08-15

## 2019-08-14 RX ORDER — LIDOCAINE HYDROCHLORIDE 10 MG/ML
5 INJECTION, SOLUTION EPIDURAL; INFILTRATION; INTRACAUDAL; PERINEURAL ONCE
Status: CANCELLED | OUTPATIENT
Start: 2019-08-15

## 2019-08-15 ENCOUNTER — HOSPITAL ENCOUNTER (OUTPATIENT)
Facility: AMBULATORY SURGERY CENTER | Age: 74
End: 2019-08-15
Attending: ORTHOPAEDIC SURGERY
Payer: COMMERCIAL

## 2019-08-15 VITALS
OXYGEN SATURATION: 98 % | DIASTOLIC BLOOD PRESSURE: 82 MMHG | SYSTOLIC BLOOD PRESSURE: 133 MMHG | BODY MASS INDEX: 25.83 KG/M2 | RESPIRATION RATE: 16 BRPM | WEIGHT: 155 LBS | TEMPERATURE: 97.8 F | HEART RATE: 51 BPM | HEIGHT: 65 IN

## 2019-08-15 DIAGNOSIS — Z98.890 POST-OPERATIVE STATE: Primary | ICD-10-CM

## 2019-08-15 RX ORDER — HYDROCODONE BITARTRATE AND ACETAMINOPHEN 5; 325 MG/1; MG/1
1 TABLET ORAL EVERY 6 HOURS PRN
Qty: 10 TABLET | Refills: 0 | Status: SHIPPED | OUTPATIENT
Start: 2019-08-15 | End: 2020-08-17

## 2019-08-15 RX ORDER — LIDOCAINE HYDROCHLORIDE AND EPINEPHRINE 10; 10 MG/ML; UG/ML
10 INJECTION, SOLUTION INFILTRATION; PERINEURAL ONCE
Status: COMPLETED | OUTPATIENT
Start: 2019-08-15 | End: 2019-08-15

## 2019-08-15 ASSESSMENT — MIFFLIN-ST. JEOR: SCORE: 1213.96

## 2019-08-15 NOTE — DISCHARGE INSTRUCTIONS
"Instructions for after your surgery    Keep the surgical dressing on and dry for 3 days. After 3 days, you may remove the surgical dressing and begin getting the wound wet in the shower and washing it gently with soap and water on a daily basis. Please keep wound covered until your follow-up visit. After the surgical dressing comes off, you may use a bandaid or a \"Coban\"-type dressing with gauze for this.  Avoid prolonged immersion of incision in water (such as tub baths, swimming, hot tubs, and dish washing without gloves) until skin is healed (about 3 weeks)     Keep your operative arm elevated as much as possible. This will help with pain and swelling.     Do not lift anything heavier than a coffee cup with your operative hand. You can use it for light activities like eating and brushing your teeth.    You will have a follow-up appointment scheduled with a nurse or  for stitch removal prior to your 6 week post-op appointment with Dr. Navarro. If you do not know when this appointment is, please call Dr. Navarro's office to find out.     [facility]  Home Care Following Your Procedure  Call a doctor if you have signs of infection (fever, growing tenderness at the surgery site, a large amount of drainage or bleeding, severe pain, foul-smelling drainage, redness, swelling).  Today you received a Marcaine or bupivacaine block to numb the nerves near your surgery site.  This is a block using local anesthetic or \"numbing\" medication injected around the nerves to anesthetize or \"numb\" the area supplied by those nerves.  This block is injected into the muscle layer near your surgical site.  The medication may numb the location where you had surgery for 6-18 hours, but may last up to 24 hours.  If your surgical site is an arm or leg you should be careful with your affected limb, since it is possible to injure your limb without being aware of it due to the numbing.  " Until full feeling returns, you should guard against bumping or hitting your limb, and avoid extreme hot or cold temperatures on the skin.  As the block wears off, the feeling will return as a tingling or prickly sensation near your surgical site.  You will experience more discomfort from your incision as the feeling returns.  You may want to take a pain pill (a narcotic or Tylenol if this was prescribed by your surgeon) when you start to experience mild pain before the pain becomes more severe.  If your pain medications do not control your pain you should notifiy your surgeon.    Tylenol/Acetaminophen Consumption  To help encourage the safe use of acetaminophen, the makers of TYLENOL  have lowered the maximum daily dose for single-ingredient Extra Strength TYLENOL  (acetaminophen) products sold in the [place] from 8 pills per day (4,000 mg) to 6 pills per day (3,000 mg). The dosing interval has also changed from 2 pills every 4-6 hours to 2 pills every 6 hours.    If you feel your pain relief is insufficient, you may take Tylenol/Acetaminophen in addition to your narcotic pain medication.     Be careful not to exceed 3,000 mg of Tylenol/Acetaminophen in a 24 hour period from all sources.    If you are taking extra strength Tylenol/acetaminophen (500 mg), the maximum dose is 6 tablets in 24 hours.    If you are taking regular strength acetaminophen (325 mg), the maximum dose is 9 tablets in 24 hours.  Your doctor is:  Dr. Castro Navarro, Orthopaedics: [phone]                                    Or dial [phone] and ask for the resident on call for:  Orthopaedics  For emergency care, call the:  [facility]: [phone] (TTY for hearing impaired: [phone])              Call Dr. Navarro's office if you experience any of the following:   Fevers, chills, increasing wound drainage, pain that is not controlled with the medications you have been prescribing, swelling that is not controlled with elevation, problems  with your splint, or any other questions or concerns.

## 2019-08-15 NOTE — H&P
Lidia Jenkins  [id number]  female  73 year old      Reason for procedure/surgery: Carpal tunnel syndrome    Patient Active Problem List   Diagnosis     Hypertension goal BP (blood pressure) < 140/90     History of basal cell carcinoma     Advance care planning       Past Surgical History:    Past Surgical History:   Procedure Laterality Date     ABDOMEN SURGERY  1987     COLONOSCOPY  2012       Past Medical History:   Past Medical History:   Diagnosis Date     Arthritis      Basal cell carcinoma      Hyperlipidaemia LDL goal < 100      Hypertension goal BP (blood pressure) < 140/90      Psoriasis        Social History:   Social History     Tobacco Use     Smoking status: Never Smoker     Smokeless tobacco: Never Used   Substance Use Topics     Alcohol use: Yes     Alcohol/week: 2.4 oz     Comment: 1-4 glasses of wine a week       Family History:   Family History   Problem Relation Age of Onset     Breast Cancer Mother      Hypertension Mother      Hyperlipidemia Mother      Depression Mother      Hypertension Brother      Hyperlipidemia Brother      Hypertension Sister      Hyperlipidemia Sister      Coronary Artery Disease Father         MI     Hypertension Father      Hyperlipidemia Father      Substance Abuse Father      Coronary Artery Disease Brother      Hypertension Brother      Substance Abuse Brother      Hyperlipidemia Brother      Hypertension Sister      Hyperlipidemia Sister      Depression Sister      Substance Abuse Sister      Other Cancer No family hx of        Allergies: No Known Allergies    Active Medications:   Current Outpatient Medications   Medication Sig Dispense Refill     atenolol (TENORMIN) 25 MG tablet TAKE 1 TABLET BY MOUTH DAILY 90 tablet 3     cholecalciferol (VITAMIN D) 1000 UNIT tablet Take 2 tablets (2,000 Units) by mouth daily 100 tablet 3     HYDROcodone-acetaminophen (NORCO) 5-325 MG tablet Take 1 tablet by mouth every 6 hours as needed for severe pain 10 tablet 0      "lovastatin (MEVACOR) 20 MG tablet Take 1 tablet (20 mg) by mouth At Bedtime 90 tablet 3       Systemic Review:   CONSTITUTIONAL: NEGATIVE for fever, chills, change in weight  ENT/MOUTH: NEGATIVE for ear, mouth and throat problems  RESP: NEGATIVE for significant cough or SOB  CV: NEGATIVE for chest pain, palpitations or peripheral edema    Physical Examination:   Vital Signs: /79   Pulse 53   Temp 97.7  F (36.5  C) (Temporal)   Resp 18   Ht 1.651 m (5' 5\")   Wt 70.3 kg (155 lb)   SpO2 99%   BMI 25.79 kg/m    NAD  A and O   Reg rate  Br comfortably     Plan: Appropriate to proceed as scheduled.      Castro Navarro  8/15/2019    PCP:  Jessica Cheema    "

## 2019-08-15 NOTE — OP NOTE
PREOPERATIVE DIAGNOSIS: left long (middle) finger trigger finger       POSTOPERATIVE DIAGNOSIS:  left long (middle) finger trigger finger       PROCEDURE PERFORMED:   left long (middle) finger trigger  release      ATTENDING PHYSICIAN:  Castro Navarro MD        ASSISTANT: NOLBERTO Leon      ANESTHESIA:  Local anesthesia only consisting of 4 mL of 1% lidocaine with epinephrine 1:100,000       ESTIMATED BLOOD LOSS:  1 mL        TOURNIQUET TIME: 5 minutes      IMPLANTS:  None.         SPECIMENS: None.      FINDINGS:  Thickened A1 pulley. Tenosynovitis, crimping of FDS at level of A1      INDICATIONS: This patient is a  73 year old year old female who presented to clinic with a chief complaint of left long (middle) finger catching and locking.  The patient elected to proceed with surgery. Risks of surgery were discussed including but not limited to infection, bleeding, wound healing problems, damage to surrounding structures including nerves, blood vessels, and tendon, recurrence, stiffness, and persistent pain. The patient expressed understanding and wished to proceed with the above surgery. Informed consent was obtained.       PROCEDURE:   The patient was identified in the preoperative area. The surgical site was marked. 4 mL of 1% lidocaine with 1:100,000 epinephrine was injected into the subcutaneous tissues at the surgical site. The patient was brought back to the operating room and positioned with the operative extremity over the hand table. A forearm tourniquet was placed. The site was prepped and draped in the usual sterile fashion. A timeout was performed confirming patient, site of surgery, and procedure to be performed. The arm was exsanguinated and the tourniquet inflated to 250 mmHg. A longitudinal incision was made overlying the A1 pulley. Blunt dissection was performed down to the flexor tendon sheath. Right angle retractors were placed on either side of the flexor sheath throughout the procedure to  protect the neurovascular bundles. All soft tissue overlying the flexor tendon sheath was gently freed and retracted.  The A1 pulley was identified and was longitudinally incised. Tenotomy scissors were used to complete the release proximally and distally to the level of the A2 pulley. Following the release, a right angle retractor was used to deliver the FDS and FDP tendons out of the flexor sheath for inspection. They did not catch or lock as this was done. The tendon was released and the patient was asked to flex and extend the digit which she was able to do without triggering or locking. The wound was irrigated copiously. Tourniquet was taken down and hemostasis was achieved. The wound was closed with 4-0 nylon horizontal mattress sutures. A sterile dressing was applied of adaptic, 4 x 4's, fluffs, akin, and Coban. Digital perfusion was excellent with good capillary refill. The patient was brought to the recovery room in stable condition.  All sponge, needle and instrument counts were correct at the end of the case.       PLAN: The dressing will remain clean, dry and intact for 5 days.  After that time the dressing may be removed, the incision may get wet in the shower and a Band-Aid may be used for coverage of the wound.  The patient should return to clinic for suture removal in 10-14 days. The patient should not lift anything heavier than the weight of a coffee cup before this time.

## 2019-08-29 ENCOUNTER — ALLIED HEALTH/NURSE VISIT (OUTPATIENT)
Dept: ORTHOPEDICS | Facility: CLINIC | Age: 74
End: 2019-08-29
Payer: COMMERCIAL

## 2019-08-29 DIAGNOSIS — M65.322 TRIGGER INDEX FINGER OF LEFT HAND: Primary | ICD-10-CM

## 2019-08-29 NOTE — PROGRESS NOTES
Reason for visit:    Lidia Jenkins came in to the clinic for a two week post op check.    Her surgery was done 8/15/19 by Dr Navarro.  She had left middle finger trigger release.     Assessment:    Lidia came into the clinic with a band aid covering her incision site.     The Surgical wounds were exposed and found to be well-healed; so the sutures were removed.    Plan:     She was placed in another band aid.  She was told to keep the area clean and dry, not to soak or submerge her hand in water, not to apply any lotions, ointments or creams over the area, and not to lift anything greater than 5 lbs.      She has an appointment to see Dr. Navarro at that time Dr. Navarro will determine further restrictions.    She has our phone number and will call with questions or problems.

## 2019-09-04 ENCOUNTER — MYC MEDICAL ADVICE (OUTPATIENT)
Dept: FAMILY MEDICINE | Facility: CLINIC | Age: 74
End: 2019-09-04

## 2019-09-04 DIAGNOSIS — H91.90 HEARING LOSS, UNSPECIFIED HEARING LOSS TYPE, UNSPECIFIED LATERALITY: Primary | ICD-10-CM

## 2019-09-21 NOTE — PROGRESS NOTES
AUDIOLOGY REPORT    SUBJECTIVE:  Lidia Jenkins is a 74 year old female who was seen in Audiology at the [facility] on 9/26/19 for audiologic evaluation, referred by Jessica Cheema PA-C.  The patient has been seen previously in this clinic on 3/14/16 for assessment and results indicated normal hearing sloping to a mild to moderate sensorineural hearing loss at 3000 Hz and above.  At that time, she elected not to pursue a trial with amplification.  The patient reports that she has been turning up the TV more, so she wonders if her hearing has changed.  She notes bilateral tinnitus when it is quiet but it is not bothersome.  Patient denies dizziness/falls, ear pain, aural fullness, and drainage.  She has never had ear surgery.      OBJECTIVE:  Abuse Screening:  Do you feel unsafe at home or work/school? No  Do you feel threatened by someone? No  Does anyone try to keep you from having contact with others, or doing things outside of your home? No  Physical signs of abuse present? No     Fall Risk Screen:  1. Have you fallen two or more times in the past year? No  2. Have you fallen and had an injury in the past year? No  Is patient a fall risk? No  Referral initiated: No  Fall Risk Assessment Completed by Audiology    Otoscopic exam indicates ears are clear of cerumen bilaterally.     Pure Tone Thresholds assessed using conventional audiometry with good  reliability from 250-8000 Hz bilaterally using circumaural headphones (rechecked with inserts).      RIGHT:  normal hearing sloping to mild-moderate sensorineural hearing loss    LEFT:    normal hearing sloping to mild-moderate sensorineural hearing loss    Tympanogram:    RIGHT: normal eardrum mobility    LEFT:   normal eardrum mobility    Reflexes (reported by stimulus ear):  RIGHT: Ipsilateral is present at normal levels  RIGHT: Contralateral is present at normal levels  LEFT:   Ipsilateral is present at  normal levels  LEFT:   Contralateral is present at normal levels      Speech Reception Threshold:    RIGHT: 15 dB HL    LEFT:   15 dB HL  Word Recognition Score:     RIGHT: 100% at 55 dB HL using NU-6 recorded word list.    LEFT:   96% at 55 dB HL using NU-6 recorded word list.      ASSESSMENT:   Sensorineural hearing loss in the high frequencies bilaterally    Compared to patient's previous audiogram dated 3/14/16, hearing in the right ear decreased 15 dB at 4000 Hz and hearing in the left ear decreased 10 dB at 1000, 3000 and 4000 Hz.  Today s results were discussed with the patient in detail.     PLAN:  Patient was counseled regarding hearing loss and impact on communication.  Patient will consider the possibility of a hearing aid trial.  This was suggested in 2016 as well but she declined.  Hearing should be rechecked in 1 year or sooner if changes.  Patient will follow up with Jessica Cheema PA-C regarding today's results and recommendations.      The patient expressed understanding and agreement with this plan.      Sourav Cedillo, CCC-A  Licensed Audiologist  [place] #[id number]    Enclosure: audiogram    Cc Jessica Cheema PA-C

## 2019-09-26 ENCOUNTER — OFFICE VISIT (OUTPATIENT)
Dept: AUDIOLOGY | Facility: CLINIC | Age: 74
End: 2019-09-26
Attending: PHYSICIAN ASSISTANT
Payer: COMMERCIAL

## 2019-09-26 DIAGNOSIS — H90.3 SENSORY HEARING LOSS, BILATERAL: Primary | ICD-10-CM

## 2019-10-25 ENCOUNTER — TRANSFERRED RECORDS (OUTPATIENT)
Dept: HEALTH INFORMATION MANAGEMENT | Facility: CLINIC | Age: 74
End: 2019-10-25

## 2019-10-25 DIAGNOSIS — Z01.84 IMMUNITY STATUS TESTING: ICD-10-CM

## 2019-10-25 PROCEDURE — 36415 COLL VENOUS BLD VENIPUNCTURE: CPT | Performed by: PHYSICIAN ASSISTANT

## 2019-10-25 PROCEDURE — 86765 RUBEOLA ANTIBODY: CPT | Performed by: PHYSICIAN ASSISTANT

## 2019-10-28 LAB — MEV IGG SER QL IA: 6.1 AI (ref 0–0.8)

## 2019-10-28 NOTE — RESULT ENCOUNTER NOTE
"Coco Murillo  Your attached labs are suggestive of immunity to measles.  Please contact the office with any questions or concerns.    Jessica Hawley \"Taz\" JUAN ANTONIO Cheema    "

## 2019-11-20 NOTE — PROGRESS NOTES
[facility] - PRIMARY CARE SKIN    CC : skin cancer screening (full-body)  SUBJECTIVE:                                                    Lidia Jenkins is a 74 year old female who presents to clinic today for a full-body skin exam because of personal history of basal cell carcinoma.    Bothersome lesions noticed by the patient or other skin concerns :  Issue One: skin lesion on right lower eyelid. It is not tender or sore.    Issue two: skin lesion on back    Issue three:  New patch of  itching on left side of the  abdomen . One year ago she has some scattered itchy plaques that resolved with triamcinolone 0.1% cream . She is using a daily moisturizer.    Personal history of skin cancer : YES- basal cell carcinoma on back and right patella.  Family history of skin cancer : NO.    Personal Medical History  Eczema Psoriasis Autoimmune   NO NO NO     Family Medical History  Eczema Psoriasis Autoimmune   NO YES- in father NO     Sun Exposure History  Previous history of significant sun exposure:  Blistering sunburns : NO  Tanning beds : NO.  Sunscreen Use : YES, frequency : sometimes. 30+ SPF  UV-protective clothing use : NO  Wide-brimmed hats : YES  UV-protective sunglasses : sometimes  Avoids mid-day sun : YES    Occupation : retired  (both indoor & outdoor).    Refer to electronic medical record (EMR) for past medical history and medications.    ROS : 14 point review of systems was negative except the symptoms listed above in the HPI.  SKIN: POSITIVE for new skin lesions    This document serves as a record of the services and decisions personally performed and made by Mary Linder MD. It was created on their behalf by Yana Paulino, a trained medical scribe. The creation of this document is based on the provider's statements to the medical scribe.  Yana Paulino November 21, 2019 9:55 AM      OBJECTIVE:                                                    GENERAL: healthy, alert and  no distress  SKIN: Ng Skin Type - I.  This patient was examined from the top of the head to the bottom of the feet  including scalp, face, neck, back, chest, breasts, buttocks, both arms, both legs, both hands, both feet, all 10 fingers and all 10 toes. The dermatoscope was used to help evaluate pigmented lesions.  Skin Pertinent Findings:  Face: scattered brown, macule(s) most consistent with benign solar lentigo. stuck-on appearing papules, raised, brown, coarse-textured, round lesion(s) most consistent with small seborrheic keratoses on lower right eyelid.     Arms: multiple brown, macule(s) most consistent with benign solar lentigo and scattered brown macules of various sizes and shapes most consistent with (benign) melanocytic nevi    Anterior trunk: brown, macule(s) most consistent with benign solar lentigo and slightly raised, red lesion(s) consistent with capillary hemangioma    Left lateral abdomen: small erythematous macular papular eruption.    and capillary hemangiomas.     Legs: multiple brown, macule(s) most consistent with benign solar lentigo and scattered brown macules of various sizes and shapes most consistent with (benign) melanocytic nevi    Back:  multiple brown, macule(s) most consistent with benign solar lentigo and scattered brown macules of various sizes and shapes most consistent with (benign) melanocytic nevi      Diagnostic Test Results:  none         ASSESSMENT:                                                      Encounter Diagnoses   Name Primary?     History of basal cell carcinoma Yes     Solar lentiginosis      Eczema, unspecified type      Capillary hemangioma      Melanocytic nevi of trunk         PLAN:                                                    Patient Instructions   Apply triamcinolone cream to abdomen twice a day if needed, no more than 10 days in a row  Use a daily moisturizer    Follow up in 1 year for a full skin check      SUN PROTECTION INSTRUCTIONS  Sun  "damage can lead to skin cancer and premature aging of the skin.      The best way to protect from sun damage to your skin is to avoid the sun during peak hours (10 am - 2 pm) even on overcast days.      Use UPF sun-protective clothing, which while more expensive initially provides longer lasting coverage without having to worry about remembering to re-apply.  1. Wear a wide-brimmed hat and sunglasses.   2. Wear sun-protective clothing.  [employer] and other [employer] make sun protective clothing that are stylish, comfortable and cool. [employer] and other [employer] make UV arm sleeves suitable for golfing, gardening and other activities.      Sunscreen instructions:  1. Use sunscreens with Zinc Oxide, Titanium Dioxide or Avobenzone to protect from UVA rays.  2. Use SPF 30-50+ to protect from UVB rays.  3. Re-apply every 2 hours even if water resistant.  4. Apply on your face every day even when cloudy and even in the winter. UVA \"aging rays\" penetrate window glass and are just as strong in the winter as in the summer.    Product Recommendations:    Good examples include: Blue Lizard, EltaMD, Solbar    Good daily moisturizers with SPF: Vanicream, CeraVe.    For sensitive skin, consider : SkinMedica Essential Defense Mineral Shield Broad Spectrum SPF 35      Never use tanning beds. Tanning beds are associated with much higher risks of skin cancer.    All tanning damages the skin. Aim for ivory skin year round and you will have less trouble with your skin in years to come. There is no merit in getting \"a base tan\" before a warm weather vacation, as any tanning indicates your body's response to sun damage.    Stop smoking. Smokers have higher rates of skin cancer and also have premature skin wrinkling.    FYI  You should use about 3 tablespoons of sunscreen to protect your whole body. Thus a typical eight ounce bottle of sunscreen should last 4 applications. Remember, that the SPF rating is compromised if " you don t apply enough. Most people only apply 1/2 - 1/3 of the amount they need. Also don t forget areas such as your ears, feet, upper back and harder to reach places. Keep in mind that these amounts should be increased for larger body sizes.    Sunscreens with titanium dioxide and/or zinc oxide in the active ingredients are physical blockers as opposed to chemical blockers. Chemical-free sunscreens should not irritate the skin.    Spray-on sunscreens may be used for touch-up application only, not as a base layer. Also, use with caution around small children due to inhalation risk.    Avoid retinyl palmitate products.    Avoid combination products that include both sunscreen and insect repellant, as sunscreen should be applied every 2 hours, but insect repellant should not be applied as frequently.    SPF means sun protection factor, which is just the degree to which the sunscreen can protect against UVB rays. There is no rating system for UVA rays. SPF is calculated as the time skin will burn when sunscreen is applied vs. skin without sunscreen.    Water resistant sunscreens should be re-applied every 1-2 hours.    For more information:  http://www.skincancer.org/prevention/sun-protection/sunscreen/sunscreens-safe-and-effective         The patient was counseled about sunscreens and sun avoidance. The patient was counseled to check the skin regularly and report any lesion that is new, changing, itching, scabbing, bleeding or otherwise bothersome. The patient was discharged ambulatory and in stable condition.    PROCEDURES:                                                    None.    TT : 25 minutes.  CT : 15 minutes.    The information in this document, created by the medical scribe for me, accurately reflects the services I personally performed and the decisions made by me. I have reviewed and approved this document for accuracy prior to leaving the patient care area.  November 21, 2019 10:08 AM

## 2019-11-21 ENCOUNTER — OFFICE VISIT (OUTPATIENT)
Dept: FAMILY MEDICINE | Facility: CLINIC | Age: 74
End: 2019-11-21
Payer: COMMERCIAL

## 2019-11-21 VITALS — DIASTOLIC BLOOD PRESSURE: 78 MMHG | SYSTOLIC BLOOD PRESSURE: 122 MMHG

## 2019-11-21 DIAGNOSIS — L30.9 ECZEMA, UNSPECIFIED TYPE: ICD-10-CM

## 2019-11-21 DIAGNOSIS — D22.5 MELANOCYTIC NEVI OF TRUNK: ICD-10-CM

## 2019-11-21 DIAGNOSIS — Z85.828 HISTORY OF BASAL CELL CARCINOMA: Primary | ICD-10-CM

## 2019-11-21 DIAGNOSIS — I78.1 CAPILLARY HEMANGIOMA: ICD-10-CM

## 2019-11-21 DIAGNOSIS — L81.4 SOLAR LENTIGINOSIS: ICD-10-CM

## 2019-11-21 PROCEDURE — 99214 OFFICE O/P EST MOD 30 MIN: CPT | Performed by: FAMILY MEDICINE

## 2019-11-21 NOTE — LETTER
11/21/2019         RE: Lidia Jenkins  [address] [place] [address]        Dear Colleague,    Thank you for referring your patient, Lidia Jenkins, to the [facility]. Please see a copy of my visit note below.    [facility] - PRIMARY CARE SKIN    CC : skin cancer screening (full-body)  SUBJECTIVE:                                                    Lidia Jenkins is a 74 year old female who presents to clinic today for a full-body skin exam because of personal history of basal cell carcinoma.    Bothersome lesions noticed by the patient or other skin concerns :  Issue One: skin lesion on right lower eyelid. It is not tender or sore.    Issue two: skin lesion on back    Issue three:  New patch of  itching on left side of the  abdomen . One year ago she has some scattered itchy plaques that resolved with triamcinolone 0.1% cream . She is using a daily moisturizer.    Personal history of skin cancer : YES- basal cell carcinoma on back and right patella.  Family history of skin cancer : NO.    Personal Medical History  Eczema Psoriasis Autoimmune   NO NO NO     Family Medical History  Eczema Psoriasis Autoimmune   NO YES- in father NO     Sun Exposure History  Previous history of significant sun exposure:  Blistering sunburns : NO  Tanning beds : NO.  Sunscreen Use : YES, frequency : sometimes. 30+ SPF  UV-protective clothing use : NO  Wide-brimmed hats : YES  UV-protective sunglasses : sometimes  Avoids mid-day sun : YES    Occupation : retired  (both indoor & outdoor).    Refer to electronic medical record (EMR) for past medical history and medications.    ROS : 14 point review of systems was negative except the symptoms listed above in the HPI.  SKIN: POSITIVE for new skin lesions    This document serves as a record of the services and decisions personally performed and made by Mary Linder MD. It was created on their behalf by demetrius Weir  trained medical scribe. The creation of this document is based on the provider's statements to the medical scribe.  Yana Paulino November 21, 2019 9:55 AM      OBJECTIVE:                                                    GENERAL: healthy, alert and no distress  SKIN: Ng Skin Type - I.  This patient was examined from the top of the head to the bottom of the feet  including scalp, face, neck, back, chest, breasts, buttocks, both arms, both legs, both hands, both feet, all 10 fingers and all 10 toes. The dermatoscope was used to help evaluate pigmented lesions.  Skin Pertinent Findings:  Face: scattered brown, macule(s) most consistent with benign solar lentigo. stuck-on appearing papules, raised, brown, coarse-textured, round lesion(s) most consistent with small seborrheic keratoses on lower right eyelid.     Arms: multiple brown, macule(s) most consistent with benign solar lentigo and scattered brown macules of various sizes and shapes most consistent with (benign) melanocytic nevi    Anterior trunk: brown, macule(s) most consistent with benign solar lentigo and slightly raised, red lesion(s) consistent with capillary hemangioma    Left lateral abdomen: small erythematous macular papular eruption.    and capillary hemangiomas.     Legs: multiple brown, macule(s) most consistent with benign solar lentigo and scattered brown macules of various sizes and shapes most consistent with (benign) melanocytic nevi    Back:  multiple brown, macule(s) most consistent with benign solar lentigo and scattered brown macules of various sizes and shapes most consistent with (benign) melanocytic nevi      Diagnostic Test Results:  none         ASSESSMENT:                                                      Encounter Diagnoses   Name Primary?     History of basal cell carcinoma Yes     Solar lentiginosis      Eczema, unspecified type      Capillary hemangioma      Melanocytic nevi of trunk         PLAN:                       "                              Patient Instructions   Apply triamcinolone cream to abdomen twice a day if needed, no more than 10 days in a row  Use a daily moisturizer    Follow up in 1 year for a full skin check      SUN PROTECTION INSTRUCTIONS  Sun damage can lead to skin cancer and premature aging of the skin.      The best way to protect from sun damage to your skin is to avoid the sun during peak hours (10 am - 2 pm) even on overcast days.      Use UPF sun-protective clothing, which while more expensive initially provides longer lasting coverage without having to worry about remembering to re-apply.  1. Wear a wide-brimmed hat and sunglasses.   2. Wear sun-protective clothing.  [employer] and other [employer] make sun protective clothing that are stylish, comfortable and cool. [employer] and other [employer] make UV arm sleeves suitable for golfing, gardening and other activities.      Sunscreen instructions:  1. Use sunscreens with Zinc Oxide, Titanium Dioxide or Avobenzone to protect from UVA rays.  2. Use SPF 30-50+ to protect from UVB rays.  3. Re-apply every 2 hours even if water resistant.  4. Apply on your face every day even when cloudy and even in the winter. UVA \"aging rays\" penetrate window glass and are just as strong in the winter as in the summer.    Product Recommendations:    Good examples include: Peter Garcia, EltaMD, Solbar    Good daily moisturizers with SPF: Vanicream, CeraVe.    For sensitive skin, consider : SkinMedica Essential Defense Mineral Shield Broad Spectrum SPF 35      Never use tanning beds. Tanning beds are associated with much higher risks of skin cancer.    All tanning damages the skin. Aim for ivory skin year round and you will have less trouble with your skin in years to come. There is no merit in getting \"a base tan\" before a warm weather vacation, as any tanning indicates your body's response to sun damage.    Stop smoking. Smokers have higher rates of skin " cancer and also have premature skin wrinkling.    FYI  You should use about 3 tablespoons of sunscreen to protect your whole body. Thus a typical eight ounce bottle of sunscreen should last 4 applications. Remember, that the SPF rating is compromised if you don t apply enough. Most people only apply 1/2 - 1/3 of the amount they need. Also don t forget areas such as your ears, feet, upper back and harder to reach places. Keep in mind that these amounts should be increased for larger body sizes.    Sunscreens with titanium dioxide and/or zinc oxide in the active ingredients are physical blockers as opposed to chemical blockers. Chemical-free sunscreens should not irritate the skin.    Spray-on sunscreens may be used for touch-up application only, not as a base layer. Also, use with caution around small children due to inhalation risk.    Avoid retinyl palmitate products.    Avoid combination products that include both sunscreen and insect repellant, as sunscreen should be applied every 2 hours, but insect repellant should not be applied as frequently.    SPF means sun protection factor, which is just the degree to which the sunscreen can protect against UVB rays. There is no rating system for UVA rays. SPF is calculated as the time skin will burn when sunscreen is applied vs. skin without sunscreen.    Water resistant sunscreens should be re-applied every 1-2 hours.    For more information:  http://www.skincancer.org/prevention/sun-protection/sunscreen/sunscreens-safe-and-effective         The patient was counseled about sunscreens and sun avoidance. The patient was counseled to check the skin regularly and report any lesion that is new, changing, itching, scabbing, bleeding or otherwise bothersome. The patient was discharged ambulatory and in stable condition.    PROCEDURES:                                                    None.    TT : 25 minutes.  CT : 15 minutes.    The information in this document, created by  the medical scribe for me, accurately reflects the services I personally performed and the decisions made by me. I have reviewed and approved this document for accuracy prior to leaving the patient care area.  November 21, 2019 10:08 AM        Again, thank you for allowing me to participate in the care of your patient.        Sincerely,        Mary Linder MD

## 2019-11-21 NOTE — PATIENT INSTRUCTIONS
"Apply triamcinolone cream to abdomen twice a day if needed, no more than 10 days in a row  Use a daily moisturizer    Follow up in 1 year for a full skin check      SUN PROTECTION INSTRUCTIONS  Sun damage can lead to skin cancer and premature aging of the skin.      The best way to protect from sun damage to your skin is to avoid the sun during peak hours (10 am - 2 pm) even on overcast days.      Use UPF sun-protective clothing, which while more expensive initially provides longer lasting coverage without having to worry about remembering to re-apply.  1. Wear a wide-brimmed hat and sunglasses.   2. Wear sun-protective clothing.  [employer] and other [employer] make sun protective clothing that are stylish, comfortable and cool. [employer] and other [employer] make UV arm sleeves suitable for golfing, gardening and other activities.      Sunscreen instructions:  1. Use sunscreens with Zinc Oxide, Titanium Dioxide or Avobenzone to protect from UVA rays.  2. Use SPF 30-50+ to protect from UVB rays.  3. Re-apply every 2 hours even if water resistant.  4. Apply on your face every day even when cloudy and even in the winter. UVA \"aging rays\" penetrate window glass and are just as strong in the winter as in the summer.    Product Recommendations:    Good examples include: Peter Garcia, EltaMD, Solbar    Good daily moisturizers with SPF: Vanicream, CeraVe.    For sensitive skin, consider : SkinMedica Essential Defense Mineral Shield Broad Spectrum SPF 35      Never use tanning beds. Tanning beds are associated with much higher risks of skin cancer.    All tanning damages the skin. Aim for ivory skin year round and you will have less trouble with your skin in years to come. There is no merit in getting \"a base tan\" before a warm weather vacation, as any tanning indicates your body's response to sun damage.    Stop smoking. Smokers have higher rates of skin cancer and also have premature skin wrinkling.    FYI  You " should use about 3 tablespoons of sunscreen to protect your whole body. Thus a typical eight ounce bottle of sunscreen should last 4 applications. Remember, that the SPF rating is compromised if you don t apply enough. Most people only apply 1/2 - 1/3 of the amount they need. Also don t forget areas such as your ears, feet, upper back and harder to reach places. Keep in mind that these amounts should be increased for larger body sizes.    Sunscreens with titanium dioxide and/or zinc oxide in the active ingredients are physical blockers as opposed to chemical blockers. Chemical-free sunscreens should not irritate the skin.    Spray-on sunscreens may be used for touch-up application only, not as a base layer. Also, use with caution around small children due to inhalation risk.    Avoid retinyl palmitate products.    Avoid combination products that include both sunscreen and insect repellant, as sunscreen should be applied every 2 hours, but insect repellant should not be applied as frequently.    SPF means sun protection factor, which is just the degree to which the sunscreen can protect against UVB rays. There is no rating system for UVA rays. SPF is calculated as the time skin will burn when sunscreen is applied vs. skin without sunscreen.    Water resistant sunscreens should be re-applied every 1-2 hours.    For more information:  http://www.skincancer.org/prevention/sun-protection/sunscreen/sunscreens-safe-and-effective

## 2020-01-10 ENCOUNTER — TRANSFERRED RECORDS (OUTPATIENT)
Dept: HEALTH INFORMATION MANAGEMENT | Facility: CLINIC | Age: 75
End: 2020-01-10

## 2020-03-27 DIAGNOSIS — E78.5 HYPERLIPIDEMIA, UNSPECIFIED HYPERLIPIDEMIA TYPE: ICD-10-CM

## 2020-03-27 DIAGNOSIS — I10 HYPERTENSION GOAL BP (BLOOD PRESSURE) < 140/90: ICD-10-CM

## 2020-03-27 DIAGNOSIS — Z00.00 ROUTINE GENERAL MEDICAL EXAMINATION AT A HEALTH CARE FACILITY: ICD-10-CM

## 2020-03-27 RX ORDER — ATENOLOL 25 MG/1
25 TABLET ORAL DAILY
Qty: 90 TABLET | Refills: 3 | Status: SHIPPED | OUTPATIENT
Start: 2020-03-27 | End: 2021-04-02

## 2020-03-27 RX ORDER — LOVASTATIN 20 MG
20 TABLET ORAL AT BEDTIME
Qty: 90 TABLET | Refills: 3 | Status: SHIPPED | OUTPATIENT
Start: 2020-03-27 | End: 2020-06-30

## 2020-03-27 NOTE — TELEPHONE ENCOUNTER
"Requested Prescriptions   Pending Prescriptions Disp Refills     atenolol (TENORMIN) 25 MG tablet  Last Written Prescription Date:  5/27/2019  Last Fill Quantity: 90 tabs,  # refills: 3   Last office visit: 11/21/2019 with prescribing provider:  Loyd   Future Office Visit:   90 tablet 3     Sig: Take 1 tablet (25 mg) by mouth daily       Beta-Blockers Protocol Passed - 3/27/2020  2:25 PM        Passed - Blood pressure under 140/90 in past 12 months     BP Readings from Last 3 Encounters:   11/21/19 122/78   08/15/19 133/82   06/10/19 113/75                 Passed - Patient is age 6 or older        Passed - Recent (12 mo) or future (30 days) visit within the authorizing provider's specialty     Patient has had an office visit with the authorizing provider or a provider within the authorizing providers department within the previous 12 mos or has a future within next 30 days. See \"Patient Info\" tab in inbasket, or \"Choose Columns\" in Meds & Orders section of the refill encounter.              Passed - Medication is active on med list           lovastatin (MEVACOR) 20 MG tablet  Last Written Prescription Date:  6/10/2019  Last Fill Quantity: 90 tabs,  # refills: 3   Last office visit: 11/21/2019 with prescribing provider:  Loyd   Future Office Visit:   90 tablet 3     Sig: Take 1 tablet (20 mg) by mouth At Bedtime       Statins Protocol Passed - 3/27/2020  2:25 PM        Passed - LDL on file in past 12 months     Recent Labs   Lab Test 06/11/19  0841   *             Passed - No abnormal creatine kinase in past 12 months     No lab results found.             Passed - Recent (12 mo) or future (30 days) visit within the authorizing provider's specialty     Patient has had an office visit with the authorizing provider or a provider within the authorizing providers department within the previous 12 mos or has a future within next 30 days. See \"Patient Info\" tab in inbasket, or \"Choose Columns\" in Meds & Orders " section of the refill encounter.              Passed - Medication is active on med list        Passed - Patient is age 18 or older        Passed - No active pregnancy on record        Passed - No positive pregnancy test in past 12 months

## 2020-06-29 DIAGNOSIS — Z00.00 ROUTINE GENERAL MEDICAL EXAMINATION AT A HEALTH CARE FACILITY: ICD-10-CM

## 2020-06-29 DIAGNOSIS — E78.5 HYPERLIPIDEMIA, UNSPECIFIED HYPERLIPIDEMIA TYPE: ICD-10-CM

## 2020-06-30 RX ORDER — LOVASTATIN 20 MG
TABLET ORAL
Qty: 90 TABLET | Refills: 0 | Status: SHIPPED | OUTPATIENT
Start: 2020-06-30 | End: 2020-08-17

## 2020-07-21 ENCOUNTER — TELEPHONE (OUTPATIENT)
Dept: FAMILY MEDICINE | Facility: CLINIC | Age: 75
End: 2020-07-21

## 2020-07-21 NOTE — TELEPHONE ENCOUNTER
General Call:   Who is calling:  Lidia  Reason for Call:  Annual skin check appointment  What are your questions or concerns:    Date of last appointment with provider:   Okay to leave a detailed message:Yes at Home number on file [phone] (home)

## 2020-08-17 ENCOUNTER — OFFICE VISIT (OUTPATIENT)
Dept: FAMILY MEDICINE | Facility: CLINIC | Age: 75
End: 2020-08-17
Payer: COMMERCIAL

## 2020-08-17 VITALS
RESPIRATION RATE: 16 BRPM | OXYGEN SATURATION: 96 % | HEART RATE: 57 BPM | BODY MASS INDEX: 26.8 KG/M2 | WEIGHT: 157 LBS | HEIGHT: 64 IN | DIASTOLIC BLOOD PRESSURE: 86 MMHG | SYSTOLIC BLOOD PRESSURE: 121 MMHG | TEMPERATURE: 97.9 F

## 2020-08-17 DIAGNOSIS — I10 HYPERTENSION GOAL BP (BLOOD PRESSURE) < 140/90: ICD-10-CM

## 2020-08-17 DIAGNOSIS — Z00.00 ENCOUNTER FOR MEDICARE ANNUAL WELLNESS EXAM: Primary | ICD-10-CM

## 2020-08-17 DIAGNOSIS — E78.5 HYPERLIPIDEMIA, UNSPECIFIED HYPERLIPIDEMIA TYPE: ICD-10-CM

## 2020-08-17 LAB
ALBUMIN SERPL-MCNC: 3.6 G/DL (ref 3.4–5)
ALP SERPL-CCNC: 49 U/L (ref 40–150)
ALT SERPL W P-5'-P-CCNC: 23 U/L (ref 0–50)
ANION GAP SERPL CALCULATED.3IONS-SCNC: 6 MMOL/L (ref 3–14)
AST SERPL W P-5'-P-CCNC: 22 U/L (ref 0–45)
BASOPHILS # BLD AUTO: 0 10E9/L (ref 0–0.2)
BASOPHILS NFR BLD AUTO: 0.5 %
BILIRUB SERPL-MCNC: 0.4 MG/DL (ref 0.2–1.3)
BUN SERPL-MCNC: 21 MG/DL (ref 7–30)
CALCIUM SERPL-MCNC: 9.1 MG/DL (ref 8.5–10.1)
CHLORIDE SERPL-SCNC: 110 MMOL/L (ref 94–109)
CHOLEST SERPL-MCNC: 182 MG/DL
CO2 SERPL-SCNC: 26 MMOL/L (ref 20–32)
CREAT SERPL-MCNC: 0.76 MG/DL (ref 0.52–1.04)
DIFFERENTIAL METHOD BLD: NORMAL
EOSINOPHIL # BLD AUTO: 0.2 10E9/L (ref 0–0.7)
EOSINOPHIL NFR BLD AUTO: 3.8 %
ERYTHROCYTE [DISTWIDTH] IN BLOOD BY AUTOMATED COUNT: 12.7 % (ref 10–15)
GFR SERPL CREATININE-BSD FRML MDRD: 78 ML/MIN/{1.73_M2}
GLUCOSE SERPL-MCNC: 94 MG/DL (ref 70–99)
HCT VFR BLD AUTO: 41.6 % (ref 35–47)
HDLC SERPL-MCNC: 61 MG/DL
HGB BLD-MCNC: 13.9 G/DL (ref 11.7–15.7)
LDLC SERPL CALC-MCNC: 107 MG/DL
LYMPHOCYTES # BLD AUTO: 1.2 10E9/L (ref 0.8–5.3)
LYMPHOCYTES NFR BLD AUTO: 27.1 %
MCH RBC QN AUTO: 30.5 PG (ref 26.5–33)
MCHC RBC AUTO-ENTMCNC: 33.4 G/DL (ref 31.5–36.5)
MCV RBC AUTO: 91 FL (ref 78–100)
MONOCYTES # BLD AUTO: 0.5 10E9/L (ref 0–1.3)
MONOCYTES NFR BLD AUTO: 11.1 %
NEUTROPHILS # BLD AUTO: 2.5 10E9/L (ref 1.6–8.3)
NEUTROPHILS NFR BLD AUTO: 57.5 %
NONHDLC SERPL-MCNC: 121 MG/DL
PLATELET # BLD AUTO: 224 10E9/L (ref 150–450)
POTASSIUM SERPL-SCNC: 4.6 MMOL/L (ref 3.4–5.3)
PROT SERPL-MCNC: 7.2 G/DL (ref 6.8–8.8)
RBC # BLD AUTO: 4.55 10E12/L (ref 3.8–5.2)
SODIUM SERPL-SCNC: 142 MMOL/L (ref 133–144)
TRIGL SERPL-MCNC: 71 MG/DL
WBC # BLD AUTO: 4.4 10E9/L (ref 4–11)

## 2020-08-17 PROCEDURE — 36415 COLL VENOUS BLD VENIPUNCTURE: CPT | Performed by: PHYSICIAN ASSISTANT

## 2020-08-17 PROCEDURE — 80061 LIPID PANEL: CPT | Performed by: PHYSICIAN ASSISTANT

## 2020-08-17 PROCEDURE — 80053 COMPREHEN METABOLIC PANEL: CPT | Performed by: PHYSICIAN ASSISTANT

## 2020-08-17 PROCEDURE — 85025 COMPLETE CBC W/AUTO DIFF WBC: CPT | Performed by: PHYSICIAN ASSISTANT

## 2020-08-17 PROCEDURE — G0439 PPPS, SUBSEQ VISIT: HCPCS | Performed by: PHYSICIAN ASSISTANT

## 2020-08-17 RX ORDER — LOVASTATIN 20 MG
20 TABLET ORAL AT BEDTIME
Qty: 90 TABLET | Refills: 3 | Status: SHIPPED | OUTPATIENT
Start: 2020-08-17 | End: 2021-04-09

## 2020-08-17 ASSESSMENT — MIFFLIN-ST. JEOR: SCORE: 1202.15

## 2020-08-17 NOTE — PROGRESS NOTES
"  SUBJECTIVE:   Lidia Jenkins is a 74 year old female who presents for Preventive Visit.  Are you in the first 12 months of your Medicare Part B coverage?  No    Physical Health:    In general, how would you rate your overall physical health? excellent    Outside of work, how many days during the week do you exercise? 6-7 days/week    Outside of work, approximately how many minutes a day do you exercise?45-60 minutes    If you drink alcohol do you typically have >3 drinks per day or >7 drinks per week? No    Do you usually eat at least 4 servings of fruit and vegetables a day, include whole grains & fiber and avoid regularly eating high fat or \"junk\" foods? Yes    Do you have any problems taking medications regularly?  No    Do you have any side effects from medications? none    Needs assistance for the following daily activities: no assistance needed    Which of the following safety concerns are present in your home?  none identified     Hearing impairment: No    In the past 6 months, have you been bothered by leaking of urine? no    Mental Health:    In general, how would you rate your overall mental or emotional health? good  PHQ-2 Score:      Do you feel safe in your environment? Yes      Additional concerns to address?  No    Fall risk:  Fallen 2 or more times in the past year?: No  Any fall with injury in the past year?: No  click delete button to remove this line now  Cognitive Screenin) Repeat 3 items (Leader, Season, Table)    2) Clock draw: NORMAL  3) 3 item recall: Recalls 3 objects  Results: 3 items recalled: COGNITIVE IMPAIRMENT LESS LIKELY    Mini-CogTM Copyright MAXIME Spencer. Licensed by the author for use in [facility]; reprinted with permission (maximiliano@.[email or web address]). All rights reserved.      Do you have sleep apnea, excessive snoring or daytime drowsiness?: no        Reviewed and updated as needed this visit by clinical staff  Tobacco  Allergies  Meds  Problems  Med Hx  Surg Hx  Fam " Hx  Soc Hx          Reviewed and updated as needed this visit by Provider  Tobacco  Allergies  Meds  Problems  Med Hx  Surg Hx  Fam Hx        Social History     Tobacco Use     Smoking status: Never Smoker     Smokeless tobacco: Never Used   Substance Use Topics     Alcohol use: Yes     Alcohol/week: 4.0 standard drinks     Comment: 1-4 glasses of wine a week                           Current providers sharing in care for this patient include:   Patient Care Team:  Jessica Cheema PA-C as PCP - General (Physician Assistant)  Ana Pantoja MD as MD (Family Medicine - Sports Medicine)  Castro Navarro MD as MD (Orthopedics)  Piedad Lloyd MD as MD (Family Practice)  Jessica Cheema PA-C as Assigned PCP    The following health maintenance items are reviewed in Epic and correct as of today:  Health Maintenance   Topic Date Due     INFLUENZA VACCINE (1) 09/01/2020     MAMMO SCREENING  06/24/2021     ADVANCE CARE PLANNING  06/27/2021     MEDICARE ANNUAL WELLNESS VISIT  08/17/2021     FALL RISK ASSESSMENT  08/17/2021     COLORECTAL CANCER SCREENING  06/15/2022     DTAP/TDAP/TD IMMUNIZATION (2 - Td) 01/01/2023     LIPID  06/11/2024     DEXA  Completed     HEPATITIS C SCREENING  Completed     PHQ-2  Completed     PNEUMOCOCCAL IMMUNIZATION 65+ LOW/MEDIUM RISK  Completed     ZOSTER IMMUNIZATION  Completed     IPV IMMUNIZATION  Aged Out     MENINGITIS IMMUNIZATION  Aged Out     HEPATITIS B IMMUNIZATION  Aged Out     Labs reviewed in EPIC  BP Readings from Last 3 Encounters:   08/17/20 121/86   11/21/19 122/78   08/15/19 133/82    Wt Readings from Last 3 Encounters:   08/17/20 71.2 kg (157 lb)   08/15/19 70.3 kg (155 lb)   06/10/19 72.6 kg (160 lb)                  Patient Active Problem List   Diagnosis     Hypertension goal BP (blood pressure) < 140/90     History of basal cell carcinoma     Advance care planning     Past Surgical History:   Procedure Laterality Date      ABDOMEN SURGERY  1987     COLONOSCOPY  2012     RELEASE TRIGGER FINGER Left 8/15/2019    Procedure: Left Middle Finger Trigger Release;  Surgeon: Castro Navarro MD;  Location:  OR       Social History     Tobacco Use     Smoking status: Never Smoker     Smokeless tobacco: Never Used   Substance Use Topics     Alcohol use: Yes     Alcohol/week: 4.0 standard drinks     Comment: 1-4 glasses of wine a week     Family History   Problem Relation Age of Onset     Breast Cancer Mother      Hypertension Mother      Hyperlipidemia Mother      Depression Mother      Hypertension Brother      Hyperlipidemia Brother      Hypertension Sister      Hyperlipidemia Sister      Coronary Artery Disease Father         MI     Hypertension Father      Hyperlipidemia Father      Substance Abuse Father      Coronary Artery Disease Brother      Hypertension Brother      Substance Abuse Brother      Hyperlipidemia Brother      Hypertension Sister      Hyperlipidemia Sister      Depression Sister      Substance Abuse Sister      Other Cancer No family hx of          Current Outpatient Medications   Medication Sig Dispense Refill     atenolol (TENORMIN) 25 MG tablet Take 1 tablet (25 mg) by mouth daily 90 tablet 3     lovastatin (MEVACOR) 20 MG tablet Take 1 tablet (20 mg) by mouth At Bedtime 90 tablet 3     cholecalciferol (VITAMIN D) 1000 UNIT tablet Take 2 tablets (2,000 Units) by mouth daily 100 tablet 3     No Known Allergies  Recent Labs   Lab Test 06/11/19  0841 06/07/18  0855 07/05/17  0851   * 103* 109*   HDL 65 68 66   TRIG 71 72 63   ALT 27 31 24   CR 0.82 0.79 0.80   GFRESTIMATED 72 72 70   GFRESTBLACK 83 87 85   POTASSIUM 4.7 4.7 4.0   TSH 2.93  --  2.40      ROS:  Constitutional, HEENT, cardiovascular, pulmonary, GI, , musculoskeletal, neuro, skin, endocrine and psych systems are negative, except as otherwise noted.    OBJECTIVE:   /86 (BP Location: Right arm, Patient Position: Sitting, Cuff Size: Adult  "Regular)   Pulse 57   Temp 97.9  F (36.6  C) (Tympanic)   Resp 16   Ht 1.626 m (5' 4\")   Wt 71.2 kg (157 lb)   SpO2 96%   BMI 26.95 kg/m   Estimated body mass index is 26.95 kg/m  as calculated from the following:    Height as of this encounter: 1.626 m (5' 4\").    Weight as of this encounter: 71.2 kg (157 lb).  EXAM:   GENERAL: healthy, alert and no distress  EYES: Eyes grossly normal to inspection, PERRL and conjunctivae and sclerae normal  HENT: ear canals and TM's normal, nose and mouth without ulcers or lesions  NECK: no adenopathy, no asymmetry, masses, or scars and thyroid normal to palpation  RESP: lungs clear to auscultation - no rales, rhonchi or wheezes  CV: regular rate and rhythm, normal S1 S2, no S3 or S4, no murmur, click or rub, no peripheral edema and peripheral pulses strong  ABDOMEN: soft, nontender, no hepatosplenomegaly, no masses and bowel sounds normal  MS: no gross musculoskeletal defects noted, no edema  SKIN: no suspicious lesions or rashes  NEURO: Normal strength and tone, mentation intact and speech normal  PSYCH: mentation appears normal, affect normal/bright    Diagnostic Test Results:  Labs reviewed in Epic    ASSESSMENT / PLAN:       ICD-10-CM    1. Encounter for Medicare annual wellness exam  Z00.00    2. Hyperlipidemia, unspecified hyperlipidemia type  E78.5 Lipid panel reflex to direct LDL Fasting     lovastatin (MEVACOR) 20 MG tablet   3. Hypertension goal BP (blood pressure) < 140/90  I10 Comprehensive metabolic panel     CBC with platelets differential       COUNSELING:  Reviewed preventive health counseling, as reflected in patient instructions       Regular exercise       Healthy diet/nutrition       Vision screening    Estimated body mass index is 26.95 kg/m  as calculated from the following:    Height as of this encounter: 1.626 m (5' 4\").    Weight as of this encounter: 71.2 kg (157 lb).         reports that she has never smoked. She has never used smokeless " tobacco.      Appropriate preventive services were discussed with this patient, including applicable screening as appropriate for cardiovascular disease, diabetes, osteopenia/osteoporosis, and glaucoma.  As appropriate for age/gender, discussed screening for colorectal cancer, prostate cancer, breast cancer, and cervical cancer. Checklist reviewing preventive services available has been given to the patient.    Reviewed patients plan of care and provided an AVS. The Basic Care Plan (routine screening as documented in Health Maintenance) for Lidia meets the Care Plan requirement. This Care Plan has been established and reviewed with the Patient.    Counseling Resources:  ATP IV Guidelines  Pooled Cohorts Equation Calculator  Breast Cancer Risk Calculator  FRAX Risk Assessment  ICSI Preventive Guidelines  Dietary Guidelines for Americans, 2010  USDA's MyPlate  ASA Prophylaxis  Lung CA Screening    Jessica Cheema PA-C  [facility]

## 2020-08-18 NOTE — PROGRESS NOTES
[facility] - PRIMARY CARE SKIN    CC : skin cancer screening (full-body)  SUBJECTIVE:                                                    Lidia Jenkins is a 75 year old female who presents to clinic today for a full-body skin exam because of personal history of basal cell carcinoma.    Bothersome lesions noticed by the patient or other skin concerns :  Issue One: ***    Personal history of skin cancer : YES- basal cell carcinoma on back and right patella.  Family history of skin cancer : NO.    Personal Medical History  Eczema Psoriasis Autoimmune   NO NO NO     Family Medical History  Eczema Psoriasis Autoimmune   NO YES- in father NO     Sun Exposure History  Previous history of significant sun exposure:  Blistering sunburns : NO  Tanning beds : NO.  Sunscreen Use : YES, frequency : sometimes. 30+ SPF  UV-protective clothing use : NO  Wide-brimmed hats : YES  UV-protective sunglasses : sometimes  Avoids mid-day sun : YES    Occupation : retired  (both indoor & outdoor).    Refer to electronic medical record (EMR) for past medical history and medications.    ROS : 14 point review of systems was negative except the symptoms listed above in the HPI.  SKIN: POSITIVE for new skin lesions        OBJECTIVE:                                                    GENERAL: healthy, alert and no distress  SKIN: Ng Skin Type - I.  This patient was examined from the top of the head to the bottom of the feet  including scalp, face, neck, back, chest, breasts, buttocks, both arms, both legs, both hands, both feet, all 10 fingers and all 10 toes. The dermatoscope was used to help evaluate pigmented lesions.  Skin Pertinent Findings:      Diagnostic Test Results:  none         ASSESSMENT:                                                      No diagnosis found.     PLAN:                                                    There are no Patient Instructions on file for this visit.     The patient was  counseled about sunscreens and sun avoidance. The patient was counseled to check the skin regularly and report any lesion that is new, changing, itching, scabbing, bleeding or otherwise bothersome. The patient was discharged ambulatory and in stable condition.    PROCEDURES:                                                    None.    TT : 25 minutes.  CT : 15 minutes.

## 2020-08-19 ENCOUNTER — OFFICE VISIT (OUTPATIENT)
Dept: FAMILY MEDICINE | Facility: CLINIC | Age: 75
End: 2020-08-19
Payer: COMMERCIAL

## 2020-08-19 VITALS — SYSTOLIC BLOOD PRESSURE: 124 MMHG | DIASTOLIC BLOOD PRESSURE: 62 MMHG

## 2020-08-19 DIAGNOSIS — Z85.828 HISTORY OF BASAL CELL CARCINOMA: ICD-10-CM

## 2020-08-19 DIAGNOSIS — D22.5 MELANOCYTIC NEVI OF TRUNK: ICD-10-CM

## 2020-08-19 DIAGNOSIS — L81.4 SOLAR LENTIGINOSIS: ICD-10-CM

## 2020-08-19 DIAGNOSIS — L82.1 SEBORRHEIC KERATOSIS: Primary | ICD-10-CM

## 2020-08-19 DIAGNOSIS — I78.1 CAPILLARY HEMANGIOMA: ICD-10-CM

## 2020-08-19 PROCEDURE — 99213 OFFICE O/P EST LOW 20 MIN: CPT | Performed by: FAMILY MEDICINE

## 2020-08-19 NOTE — PROGRESS NOTES
[facility] - PRIMARY CARE SKIN    CC : skin cancer screening (full-body)  SUBJECTIVE:                                                    Lidia Jenkins is a 75 year old female who presents to clinic today for a full-body skin exam because of personal history of basal cell carcinoma.    Bothersome lesions noticed by the patient or other skin concerns :  Issue One: Last winter the whole back would itch and irritated by the clothing. Did apply moisturizer    Personal history of skin cancer : YES- basal cell carcinoma on back and right patella.  Family history of skin cancer : NO.    Personal Medical History  Eczema Psoriasis Autoimmune   NO NO NO     Family Medical History  Eczema Psoriasis Autoimmune   NO YES- in father NO     Sun Exposure History  Previous history of significant sun exposure:  Blistering sunburns : NO  Tanning beds : NO.  Sunscreen Use : YES, frequency : sometimes. 30+ SPF  UV-protective clothing use : NO  Wide-brimmed hats : YES  UV-protective sunglasses : sometimes  Avoids mid-day sun : YES    Occupation : retired  (both indoor & outdoor).    Refer to electronic medical record (EMR) for past medical history and medications.    ROS : 14 point review of systems was negative except the symptoms listed above in the HPI.  SKIN: POSITIVE for new skin lesions        OBJECTIVE:                                                    GENERAL: healthy, alert and no distress  SKIN: Ng Skin Type - I.  This patient was examined from the top of the head to the bottom of the feet  including scalp, face, neck, back, chest, breasts, buttocks, both arms, both legs, both hands, both feet, all 10 fingers and all 10 toes. The dermatoscope was used to help evaluate pigmented lesions.  Skin Pertinent Findings:  Trunk, arms, legs,            Brown, macule(s) most consistent with benign solar lentigo          Raised, coarse textured, stuck appearing lesion consistent with seborrheic  "keratosis .          Slightly raised, red lesion(s) consistent with capillary hemangioma          Brown macules of various sizes and shapes most consistent with (benign) melanocytic nevi                Diagnostic Test Results:  none         ASSESSMENT:                                                      Encounter Diagnoses   Name Primary?     Seborrheic keratosis Yes     Solar lentiginosis      History of basal cell carcinoma      Melanocytic nevi of trunk         PLAN:                                                    Patient Instructions   Skin exam in one year  SUN PROTECTION INSTRUCTIONS  Sun damage can lead to skin cancer and premature aging of the skin.      The best way to protect from sun damage to your skin is to avoid the sun during peak hours (10 am - 2 pm) even on overcast days.    Never use tanning beds. Tanning beds are associated with much higher risks of skin cancer.    All tanning damages the skin. Aim for ivory skin year round and you will have less trouble with your skin in years to come. There is no merit in getting \"a base tan\" before a warm weather vacation, as any tanning indicates your body's response to sun damage.    Stop smoking. Smokers have higher rates of skin cancer and also have premature skin wrinkling.    Use UPF sun-protective clothing, which while more expensive initially provides longer lasting coverage without having to worry about remembering to re-apply.  1. Wear a wide-brimmed hat and sunglasses.   2. Wear sun-protective clothing.  "[employer] and other [employer] make sun protective clothing that are stylish, comfortable and cool.   [employer] and other [employer] make UV arm sleeves suitable for golfing, gardening and other activities.    Sunscreen instructions:  1. Use sunscreens with Zinc Oxide, Titanium Dioxide or Avobenzone to protect from UVA rays.  2. Use SPF 30-50+ to protect from UVB rays.  3. Re-apply every 2 hours even if water resistant.  4. Apply on " "your face every day even when cloudy and even in the winter. UVA \"aging rays\" penetrate window glass and are just as strong in the winter as in the summer.    FYI  You should use about 3 tablespoons of sunscreen to protect your whole body. Thus a typical eight ounce bottle of sunscreen should last 4 applications. Remember, that the SPF rating is compromised if you don t apply enough. Most people only apply 1/2 - 1/3 of the amount they need. Also don t forget areas such as your ears, feet, upper back and harder to reach places. Keep in mind that these amounts should be increased for larger body sizes.    Sunscreens with titanium dioxide and/or zinc oxide in the active ingredients are physical blockers as opposed to chemical blockers. Chemical-free sunscreens should not irritate the skin.    Spray-on sunscreens may be used for touch-up application only, not as a base layer. Also, use with caution around small children due to inhalation risk.    SPF means sun protection factor, which is just the degree to which the sunscreen can protect against UVB rays. There is no rating system for UVA rays. SPF is calculated as the time skin will burn when sunscreen is applied vs. skin without sunscreen.    Water resistant sunscreens should be re-applied every 1-2 hours.    Product Recommendations:    Consider use of sunscreen sticks with Zinc Oxide and Titanium Dioxide active ingredients such as Neutrogena Pure&Free Baby Sunscreen Stick.    Good examples include: Blue Lizard, EltaMD, Solbar    Good daily moisturizers with SPF: Vanicream, CeraVe.    For sensitive skin, consider : Skin[employer]ica Essential Defense Mineral Shield Broad Spectrum SPF 35    Men: consider use of Neutrogena Triple Protect Facial Lotion    Avoid retinyl palmitate products.  Avoid combination products that include both sunscreen and insect repellant, as sunscreen should be applied every 2 hours, but insect repellant should not be applied as frequently.    For " more information:  https://www.skincancer.org/prevention/sun-protection/sunscreen/sunscreens-safe-and-effective           The patient was counseled about sunscreens and sun avoidance. The patient was counseled to check the skin regularly and report any lesion that is new, changing, itching, scabbing, bleeding or otherwise bothersome. The patient was discharged ambulatory and in stable condition.    PROCEDURES:                                                    None.    TT : 25 minutes.  CT : 15 minutes.

## 2020-08-19 NOTE — LETTER
8/19/2020         RE: Lidia Jenkins  [address] [place], [place] [id number]        Dear Colleague,    Thank you for referring your patient, Lidia Jenkins, to the [facility] [place]. Please see a copy of my visit note below.    [facility] - PRIMARY CARE SKIN    CC : skin cancer screening (full-body)  SUBJECTIVE:                                                    Lidia Jenkins is a 75 year old female who presents to clinic today for a full-body skin exam because of personal history of basal cell carcinoma.    Bothersome lesions noticed by the patient or other skin concerns :  Issue One: Last winter the whole back would itch and irritated by the clothing. Did apply moisturizer    Personal history of skin cancer : YES- basal cell carcinoma on back and right patella.  Family history of skin cancer : NO.    Personal Medical History  Eczema Psoriasis Autoimmune   NO NO NO     Family Medical History  Eczema Psoriasis Autoimmune   NO YES- in father NO     Sun Exposure History  Previous history of significant sun exposure:  Blistering sunburns : NO  Tanning beds : NO.  Sunscreen Use : YES, frequency : sometimes. 30+ SPF  UV-protective clothing use : NO  Wide-brimmed hats : YES  UV-protective sunglasses : sometimes  Avoids mid-day sun : YES    Occupation : retired  (both indoor & outdoor).    Refer to electronic medical record (EMR) for past medical history and medications.    ROS : 14 point review of systems was negative except the symptoms listed above in the HPI.  SKIN: POSITIVE for new skin lesions        OBJECTIVE:                                                    GENERAL: healthy, alert and no distress  SKIN: Ng Skin Type - I.  This patient was examined from the top of the head to the bottom of the feet  including scalp, face, neck, back, chest, breasts, buttocks, both arms, both legs, both hands, both feet, all 10 fingers and all 10 toes. The dermatoscope  "was used to help evaluate pigmented lesions.  Skin Pertinent Findings:  Trunk, arms, legs,            Brown, macule(s) most consistent with benign solar lentigo          Raised, coarse textured, stuck appearing lesion consistent with seborrheic keratosis .          Slightly raised, red lesion(s) consistent with capillary hemangioma          Brown macules of various sizes and shapes most consistent with (benign) melanocytic nevi                Diagnostic Test Results:  none         ASSESSMENT:                                                      Encounter Diagnoses   Name Primary?     Seborrheic keratosis Yes     Solar lentiginosis      History of basal cell carcinoma      Melanocytic nevi of trunk         PLAN:                                                    Patient Instructions   Skin exam in one year  SUN PROTECTION INSTRUCTIONS  Sun damage can lead to skin cancer and premature aging of the skin.      The best way to protect from sun damage to your skin is to avoid the sun during peak hours (10 am - 2 pm) even on overcast days.    Never use tanning beds. Tanning beds are associated with much higher risks of skin cancer.    All tanning damages the skin. Aim for ivory skin year round and you will have less trouble with your skin in years to come. There is no merit in getting \"a base tan\" before a warm weather vacation, as any tanning indicates your body's response to sun damage.    Stop smoking. Smokers have higher rates of skin cancer and also have premature skin wrinkling.    Use UPF sun-protective clothing, which while more expensive initially provides longer lasting coverage without having to worry about remembering to re-apply.  1. Wear a wide-brimmed hat and sunglasses.   2. Wear sun-protective clothing.  "[employer]" and other [employer] make sun protective clothing that are stylish, comfortable and cool.   [employer] and other [employer] make UV arm sleeves suitable for golfing, gardening and " "other activities.    Sunscreen instructions:  1. Use sunscreens with Zinc Oxide, Titanium Dioxide or Avobenzone to protect from UVA rays.  2. Use SPF 30-50+ to protect from UVB rays.  3. Re-apply every 2 hours even if water resistant.  4. Apply on your face every day even when cloudy and even in the winter. UVA \"aging rays\" penetrate window glass and are just as strong in the winter as in the summer.    FYI  You should use about 3 tablespoons of sunscreen to protect your whole body. Thus a typical eight ounce bottle of sunscreen should last 4 applications. Remember, that the SPF rating is compromised if you don t apply enough. Most people only apply 1/2 - 1/3 of the amount they need. Also don t forget areas such as your ears, feet, upper back and harder to reach places. Keep in mind that these amounts should be increased for larger body sizes.    Sunscreens with titanium dioxide and/or zinc oxide in the active ingredients are physical blockers as opposed to chemical blockers. Chemical-free sunscreens should not irritate the skin.    Spray-on sunscreens may be used for touch-up application only, not as a base layer. Also, use with caution around small children due to inhalation risk.    SPF means sun protection factor, which is just the degree to which the sunscreen can protect against UVB rays. There is no rating system for UVA rays. SPF is calculated as the time skin will burn when sunscreen is applied vs. skin without sunscreen.    Water resistant sunscreens should be re-applied every 1-2 hours.    Product Recommendations:    Consider use of sunscreen sticks with Zinc Oxide and Titanium Dioxide active ingredients such as Neutrogena Pure&Free Baby Sunscreen Stick.    Good examples include: Blue Lizard, EltaMD, Solbar    Good daily moisturizers with SPF: Vanicream, CeraVe.    For sensitive skin, consider : SkinMedica Essential Defense Mineral Shield Broad Spectrum SPF 35    Men: consider use of Neutrogena Triple " Protect Facial Lotion    Avoid retinyl palmitate products.  Avoid combination products that include both sunscreen and insect repellant, as sunscreen should be applied every 2 hours, but insect repellant should not be applied as frequently.    For more information:  https://www.skincancer.org/prevention/sun-protection/sunscreen/sunscreens-safe-and-effective           The patient was counseled about sunscreens and sun avoidance. The patient was counseled to check the skin regularly and report any lesion that is new, changing, itching, scabbing, bleeding or otherwise bothersome. The patient was discharged ambulatory and in stable condition.    PROCEDURES:                                                    None.    TT : 25 minutes.  CT : 15 minutes.            Again, thank you for allowing me to participate in the care of your patient.        Sincerely,        Mary Linder MD

## 2020-08-19 NOTE — PATIENT INSTRUCTIONS
"Skin exam in one year  SUN PROTECTION INSTRUCTIONS  Sun damage can lead to skin cancer and premature aging of the skin.      The best way to protect from sun damage to your skin is to avoid the sun during peak hours (10 am - 2 pm) even on overcast days.    Never use tanning beds. Tanning beds are associated with much higher risks of skin cancer.    All tanning damages the skin. Aim for ivory skin year round and you will have less trouble with your skin in years to come. There is no merit in getting \"a base tan\" before a warm weather vacation, as any tanning indicates your body's response to sun damage.    Stop smoking. Smokers have higher rates of skin cancer and also have premature skin wrinkling.    Use UPF sun-protective clothing, which while more expensive initially provides longer lasting coverage without having to worry about remembering to re-apply.  1. Wear a wide-brimmed hat and sunglasses.   2. Wear sun-protective clothing.  [employer] and other [employer] make sun protective clothing that are stylish, comfortable and cool.   [employer] and other [employer] make UV arm sleeves suitable for golfing, gardening and other activities.    Sunscreen instructions:  1. Use sunscreens with Zinc Oxide, Titanium Dioxide or Avobenzone to protect from UVA rays.  2. Use SPF 30-50+ to protect from UVB rays.  3. Re-apply every 2 hours even if water resistant.  4. Apply on your face every day even when cloudy and even in the winter. UVA \"aging rays\" penetrate window glass and are just as strong in the winter as in the summer.    FYI  You should use about 3 tablespoons of sunscreen to protect your whole body. Thus a typical eight ounce bottle of sunscreen should last 4 applications. Remember, that the SPF rating is compromised if you don t apply enough. Most people only apply 1/2 - 1/3 of the amount they need. Also don t forget areas such as your ears, feet, upper back and harder to reach places. Keep in mind that " these amounts should be increased for larger body sizes.    Sunscreens with titanium dioxide and/or zinc oxide in the active ingredients are physical blockers as opposed to chemical blockers. Chemical-free sunscreens should not irritate the skin.    Spray-on sunscreens may be used for touch-up application only, not as a base layer. Also, use with caution around small children due to inhalation risk.    SPF means sun protection factor, which is just the degree to which the sunscreen can protect against UVB rays. There is no rating system for UVA rays. SPF is calculated as the time skin will burn when sunscreen is applied vs. skin without sunscreen.    Water resistant sunscreens should be re-applied every 1-2 hours.    Product Recommendations:    Consider use of sunscreen sticks with Zinc Oxide and Titanium Dioxide active ingredients such as Neutrogena Pure&Free Baby Sunscreen Stick.    Good examples include: Blue Lizard, EltaMD, Solbar    Good daily moisturizers with SPF: Vanicream, CeraVe.    For sensitive skin, consider : SkinMedica Essential Defense Mineral Shield Broad Spectrum SPF 35    Men: consider use of Neutrogena Triple Protect Facial Lotion    Avoid retinyl palmitate products.  Avoid combination products that include both sunscreen and insect repellant, as sunscreen should be applied every 2 hours, but insect repellant should not be applied as frequently.    For more information:  https://www.skincancer.org/prevention/sun-protection/sunscreen/sunscreens-safe-and-effective

## 2020-08-30 ENCOUNTER — VIRTUAL VISIT (OUTPATIENT)
Dept: FAMILY MEDICINE | Facility: OTHER | Age: 75
End: 2020-08-30

## 2020-08-31 ENCOUNTER — AMBULATORY - HEALTHEAST (OUTPATIENT)
Dept: FAMILY MEDICINE | Facility: CLINIC | Age: 75
End: 2020-08-31

## 2020-08-31 DIAGNOSIS — Z20.822 SUSPECTED COVID-19 VIRUS INFECTION: ICD-10-CM

## 2020-08-31 NOTE — PROGRESS NOTES
"Date: 2020 18:19:52  Clinician: John Keene  Clinician NPI: [id number]  Patient: Lidia Jenkins  Patient : 1945  Patient Address: [address] [place] [address] [place] [address]  Patient Phone: [phone]  Visit Protocol: URI  Patient Summary:  Lidia is a 75 year old ( : 1945 ) female who initiated a Visit for COVID-19 (Coronavirus) evaluation and screening. When asked the question \"Please sign me up to receive news, health information and promotions. \", Lidia responded \"No\".    Lidia states her symptoms started gradually 3-4 days ago. After her symptoms started, they improved and then got worse again.   Her symptoms consist of a headache, chills, malaise, and myalgia. Lidia also feels feverish.   Symptom details     Temperature: Her current temperature is 100 degrees Fahrenheit.     Headache: She states the headache is mild (1-3 on a 10 point pain scale).      Lidia denies having ear pain, enlarged lymph nodes, wheezing, sore throat, teeth pain, ageusia, diarrhea, cough, nasal congestion, vomiting, rhinitis, nausea, anosmia, and facial pain or pressure. She also denies having a sinus infection within the past year, having recent facial or sinus surgery in the past 60 days, and taking antibiotic medication in the past month. She is not experiencing dyspnea.   Precipitating events  She has not recently been exposed to someone with influenza. Lidia has not been in close contact with any high risk individuals.   Pertinent COVID-19 (Coronavirus) information  In the past 14 days, Lidia has not worked in a congregate living setting.   She does not work or volunteer as healthcare worker or a  and does not work or volunteer in a healthcare facility.   Lidia also has not lived in a congregate living setting in the past 14 days. She does not live with a healthcare worker.   Lidia has not had a close contact with a laboratory-confirmed COVID-19 patient within 14 days of symptom onset.   Since 2019, Lidia " and has not had upper respiratory infection or influenza-like illness. Has not been diagnosed with lab-confirmed COVID-19 test   Pertinent medical history  Lidia does not get yeast infections when she takes antibiotics.   Lidia does not need a return to work/school note.   Weight: 155 lbs   Lidia does not smoke or use smokeless tobacco.   Weight: 155 lbs    MEDICATIONS: lovastatin oral, atenolol (bulk), ALLERGIES: NKDA  Clinician Response:  Dear Lidia,   Your symptoms show that you may have coronavirus (COVID-19). This illness can cause fever, cough and trouble breathing. Many people get a mild case and get better on their own. Some people can get very sick.  What should I do?  We would like to test you for this virus.   1. Please call [phone] to schedule your visit. Explain that you were referred by [facility] to have a COVID-19 test. Be ready to share your [facility] visit ID number.  The following will serve as your written order for this COVID Test, ordered by me, for the indication of suspected COVID [Z20.828]: The test will be ordered in [employer], our electronic health record, after you are scheduled. It will show as ordered and authorized by Joe Allen MD.  Order: COVID-19 (Coronavirus) PCR for SYMPTOMATIC testing from [facility].      2. When it's time for your COVID test:  Stay at least 6 feet away from others. (If someone will drive you to your test, stay in the backseat, as far away from the  as you can.)   Cover your mouth and nose with a mask, tissue or washcloth.  Go straight to the testing site. Don't make any stops on the way there or back.      3.Starting now: Stay home and away from others (self-isolate) until:   You've had no fever---and no medicine that reduces fever---for one full day (24 hours). And...   Your other symptoms have gotten better. For example, your cough or breathing has improved. And...   At least 10 days have passed since your symptoms started.       During this time, don't leave the  "house except for testing or medical care.   Stay in your own room, even for meals. Use your own bathroom if you can.   Stay away from others in your home. No hugging, kissing or shaking hands. No visitors.  Don't go to work, school or anywhere else.    Clean \"high touch\" surfaces often (doorknobs, counters, handles, etc.). Use a household cleaning spray or wipes. You'll find a full list of  on the EPA website: www.epa.gov/pesticide-registration/list-n-disinfectants-use-against-sars-cov-2.   Cover your mouth and nose with a mask, tissue or washcloth to avoid spreading germs.  Wash your hands and face often. Use soap and water.  Caregivers in these groups are at risk for severe illness due to COVID-19:  o People 65 years and older  o People who live in a nursing home or long-term care facility  o People with chronic disease (lung, heart, cancer, diabetes, kidney, liver, immunologic)  o People who have a weakened immune system, including those who:   Are in cancer treatment  Take medicine that weakens the immune system, such as corticosteroids  Had a bone marrow or organ transplant  Have an immune deficiency  Have poorly controlled HIV or AIDS  Are obese (body mass index of 40 or higher)  Smoke regularly   o Caregivers should wear gloves while washing dishes, handling laundry and cleaning bedrooms and bathrooms.  o Use caution when washing and drying laundry: Don't shake dirty laundry, and use the warmest water setting that you can.  o For more tips, go to www.cdc.gov/coronavirus/2019-ncov/downloads/10Things.pdf.    4.Sign up for [employer]. We know it's scary to hear that you might have COVID-19. We want to track your symptoms to make sure you're okay over the next 2 weeks. Please look for an email from [employer]---this is a free, online program that we'll use to keep in touch. To sign up, follow the link in the email. Learn more at [email or web address]  How can I take care of myself?   Get " lots of rest. Drink extra fluids (unless a doctor has told you not to).   Take Tylenol (acetaminophen) for fever or pain. If you have liver or kidney problems, ask your family doctor if it's okay to take Tylenol.   Adults can take either:    650 mg (two 325 mg pills) every 4 to 6 hours, or...   1,000 mg (two 500 mg pills) every 8 hours as needed.    Note: Don't take more than 3,000 mg in one day. Acetaminophen is found in many medicines (both prescribed and over-the-counter medicines). Read all labels to be sure you don't take too much.   For children, check the Tylenol bottle for the right dose. The dose is based on the child's age or weight.    If you have other health problems (like cancer, heart failure, an organ transplant or severe kidney disease): Call your specialty clinic if you don't feel better in the next 2 days.       Know when to call 911. Emergency warning signs include:    Trouble breathing or shortness of breath Pain or pressure in the chest that doesn't go away Feeling confused like you haven't felt before, or not being able to wake up Bluish-colored lips or face.  Where can I get more information?   [facility] -- About COVID-19: [email or web address]   CDC -- What to Do If You're Sick: www.cdc.gov/coronavirus/2019-ncov/about/steps-when-sick.html   CDC -- Ending Home Isolation: www.cdc.gov/coronavirus/2019-ncov/hcp/disposition-in-home-patients.html   CDC -- Caring for Someone: www.cdc.gov/coronavirus/2019-ncov/if-you-are-sick/care-for-someone.html   [facility] -- Interim Guidance for Hospital Discharge to Home: www.health.[facility].[place].us/diseases/coronavirus/hcp/hospdischarge.pdf   [facility] clinical trials (COVID-19 research studies): [email or web address].[email or web address]/umn-clinical-trials    Below are the COVID-19 hotlines at the [place] Department of Health ([facility]). Interpreters are available.    For health questions: Call 654-362-8161 or 1-891.498.7528 (7 a.m. to 7 p.m.) For questions  about schools and childcare: Call 078-854-1956 or 1-336.204.9210 (7 a.m. to 7 p.m.)    Diagnosis: Other malaise  Diagnosis ICD: R53.81

## 2020-09-02 ENCOUNTER — COMMUNICATION - HEALTHEAST (OUTPATIENT)
Dept: SCHEDULING | Facility: CLINIC | Age: 75
End: 2020-09-02

## 2020-12-27 ENCOUNTER — HEALTH MAINTENANCE LETTER (OUTPATIENT)
Age: 75
End: 2020-12-27

## 2021-04-05 NOTE — NURSING NOTE
Injectable Influenza Immunization Documentation    1.  Is the person to be vaccinated sick today?   No    2. Does the person to be vaccinated have an allergy to a component   of the vaccine?   No    3. Has the person to be vaccinated ever had a serious reaction   to influenza vaccine in the past?   No    4. Has the person to be vaccinated ever had Guillain-Barré syndrome?   No    Form completed by Twyla العراقي MA   Detail Level: Simple Additional Notes: Patient consent was obtained to proceed with the visit and recommended plan of care after discussion of all risks and benefits, including the risks of COVID-19 exposure.

## 2021-04-08 DIAGNOSIS — E78.5 HYPERLIPIDEMIA, UNSPECIFIED HYPERLIPIDEMIA TYPE: ICD-10-CM

## 2021-04-09 RX ORDER — LOVASTATIN 20 MG
20 TABLET ORAL AT BEDTIME
Qty: 90 TABLET | Refills: 1 | Status: SHIPPED | OUTPATIENT
Start: 2021-04-09 | End: 2021-06-15

## 2021-05-21 NOTE — PROGRESS NOTES
Coco JamesCarmen Jenkins,  Some of Your results came back and are within acceptable limits. -Normal red blood cell (hgb) levels, normal white blood cell count ( minimally low by one point not significant ) and normal platelet levels.. If you have any further concerns please do not hesitate to contact us by message, phone or making an appointment.  Have a good day   Dr Loyd PICKARD Patient notified as directed per Dr Serrano.  Patient verbalized understanding.  No further questions

## 2021-05-26 ENCOUNTER — TELEPHONE (OUTPATIENT)
Dept: FAMILY MEDICINE | Facility: CLINIC | Age: 76
End: 2021-05-26

## 2021-05-26 ENCOUNTER — TRANSFERRED RECORDS (OUTPATIENT)
Dept: HEALTH INFORMATION MANAGEMENT | Facility: CLINIC | Age: 76
End: 2021-05-26

## 2021-05-26 NOTE — TELEPHONE ENCOUNTER
Melissa VENCES NP from [facility] called, works for patient's insurance company.    They see patient's annually to see how they are doing, make sure they have primary provider etc.    States she saw patient today to assess.  States she has bilateral moderate PAD.  States patient currently having no symptoms    Calling as FYI.  No need to call her back    Thanks,  Manju Yan RN

## 2021-06-09 ENCOUNTER — ANCILLARY PROCEDURE (OUTPATIENT)
Dept: MAMMOGRAPHY | Facility: CLINIC | Age: 76
End: 2021-06-09
Attending: PHYSICIAN ASSISTANT
Payer: COMMERCIAL

## 2021-06-09 DIAGNOSIS — Z00.00 PREVENTATIVE HEALTH CARE: ICD-10-CM

## 2021-06-09 PROCEDURE — 77067 SCR MAMMO BI INCL CAD: CPT

## 2021-06-09 PROCEDURE — 77067 SCR MAMMO BI INCL CAD: CPT | Mod: 26 | Performed by: STUDENT IN AN ORGANIZED HEALTH CARE EDUCATION/TRAINING PROGRAM

## 2021-06-11 ENCOUNTER — TRANSFERRED RECORDS (OUTPATIENT)
Dept: HEALTH INFORMATION MANAGEMENT | Facility: CLINIC | Age: 76
End: 2021-06-11

## 2021-06-13 ASSESSMENT — ENCOUNTER SYMPTOMS
PARESTHESIAS: 0
DIARRHEA: 0
HEMATOCHEZIA: 0
CHILLS: 0
BREAST MASS: 0
WEAKNESS: 0
HEARTBURN: 0
ARTHRALGIAS: 1
NAUSEA: 0
FREQUENCY: 0
HEADACHES: 0
HEMATURIA: 0
PALPITATIONS: 0
SHORTNESS OF BREATH: 0
EYE PAIN: 0
COUGH: 0
FEVER: 0
DYSURIA: 0
CONSTIPATION: 0
ABDOMINAL PAIN: 0
SORE THROAT: 0
MYALGIAS: 0
NERVOUS/ANXIOUS: 0
JOINT SWELLING: 0
DIZZINESS: 0

## 2021-06-13 ASSESSMENT — ACTIVITIES OF DAILY LIVING (ADL): CURRENT_FUNCTION: NO ASSISTANCE NEEDED

## 2021-06-15 ENCOUNTER — OFFICE VISIT (OUTPATIENT)
Dept: FAMILY MEDICINE | Facility: CLINIC | Age: 76
End: 2021-06-15
Payer: COMMERCIAL

## 2021-06-15 VITALS
SYSTOLIC BLOOD PRESSURE: 132 MMHG | TEMPERATURE: 96.5 F | OXYGEN SATURATION: 99 % | BODY MASS INDEX: 27.83 KG/M2 | HEIGHT: 64 IN | HEART RATE: 56 BPM | DIASTOLIC BLOOD PRESSURE: 80 MMHG | WEIGHT: 163 LBS | RESPIRATION RATE: 16 BRPM

## 2021-06-15 DIAGNOSIS — I10 HYPERTENSION GOAL BP (BLOOD PRESSURE) < 140/90: ICD-10-CM

## 2021-06-15 DIAGNOSIS — E78.5 HYPERLIPIDEMIA, UNSPECIFIED HYPERLIPIDEMIA TYPE: ICD-10-CM

## 2021-06-15 DIAGNOSIS — Z00.00 ENCOUNTER FOR MEDICARE ANNUAL WELLNESS EXAM: Primary | ICD-10-CM

## 2021-06-15 LAB
ALBUMIN SERPL-MCNC: 3.7 G/DL (ref 3.4–5)
ALP SERPL-CCNC: 51 U/L (ref 40–150)
ALT SERPL W P-5'-P-CCNC: 25 U/L (ref 0–50)
ANION GAP SERPL CALCULATED.3IONS-SCNC: 4 MMOL/L (ref 3–14)
AST SERPL W P-5'-P-CCNC: 21 U/L (ref 0–45)
BILIRUB SERPL-MCNC: 0.4 MG/DL (ref 0.2–1.3)
BUN SERPL-MCNC: 27 MG/DL (ref 7–30)
CALCIUM SERPL-MCNC: 8.7 MG/DL (ref 8.5–10.1)
CHLORIDE SERPL-SCNC: 109 MMOL/L (ref 94–109)
CHOLEST SERPL-MCNC: 200 MG/DL
CO2 SERPL-SCNC: 27 MMOL/L (ref 20–32)
CREAT SERPL-MCNC: 0.83 MG/DL (ref 0.52–1.04)
CREAT UR-MCNC: 154 MG/DL
ERYTHROCYTE [DISTWIDTH] IN BLOOD BY AUTOMATED COUNT: 13 % (ref 10–15)
GFR SERPL CREATININE-BSD FRML MDRD: 69 ML/MIN/{1.73_M2}
GLUCOSE SERPL-MCNC: 92 MG/DL (ref 70–99)
HCT VFR BLD AUTO: 40.5 % (ref 35–47)
HDLC SERPL-MCNC: 67 MG/DL
HGB BLD-MCNC: 13.6 G/DL (ref 11.7–15.7)
LDLC SERPL CALC-MCNC: 119 MG/DL
MCH RBC QN AUTO: 29.6 PG (ref 26.5–33)
MCHC RBC AUTO-ENTMCNC: 33.6 G/DL (ref 31.5–36.5)
MCV RBC AUTO: 88 FL (ref 78–100)
MICROALBUMIN UR-MCNC: 15 MG/L
MICROALBUMIN/CREAT UR: 9.68 MG/G CR (ref 0–25)
NONHDLC SERPL-MCNC: 133 MG/DL
PLATELET # BLD AUTO: 241 10E9/L (ref 150–450)
POTASSIUM SERPL-SCNC: 3.8 MMOL/L (ref 3.4–5.3)
PROT SERPL-MCNC: 7.1 G/DL (ref 6.8–8.8)
RBC # BLD AUTO: 4.6 10E12/L (ref 3.8–5.2)
SODIUM SERPL-SCNC: 140 MMOL/L (ref 133–144)
TRIGL SERPL-MCNC: 71 MG/DL
WBC # BLD AUTO: 4.9 10E9/L (ref 4–11)

## 2021-06-15 PROCEDURE — 99397 PER PM REEVAL EST PAT 65+ YR: CPT | Performed by: FAMILY MEDICINE

## 2021-06-15 PROCEDURE — 85027 COMPLETE CBC AUTOMATED: CPT | Performed by: FAMILY MEDICINE

## 2021-06-15 PROCEDURE — 36415 COLL VENOUS BLD VENIPUNCTURE: CPT | Performed by: FAMILY MEDICINE

## 2021-06-15 PROCEDURE — 80053 COMPREHEN METABOLIC PANEL: CPT | Performed by: FAMILY MEDICINE

## 2021-06-15 PROCEDURE — 80061 LIPID PANEL: CPT | Performed by: FAMILY MEDICINE

## 2021-06-15 PROCEDURE — 82043 UR ALBUMIN QUANTITATIVE: CPT | Performed by: FAMILY MEDICINE

## 2021-06-15 RX ORDER — ATENOLOL 25 MG/1
25 TABLET ORAL DAILY
Qty: 90 TABLET | Refills: 3 | Status: SHIPPED | OUTPATIENT
Start: 2021-06-15 | End: 2022-07-21

## 2021-06-15 RX ORDER — LOVASTATIN 20 MG
20 TABLET ORAL AT BEDTIME
Qty: 90 TABLET | Refills: 3 | Status: SHIPPED | OUTPATIENT
Start: 2021-06-15 | End: 2022-07-21

## 2021-06-15 ASSESSMENT — ENCOUNTER SYMPTOMS
MYALGIAS: 0
JOINT SWELLING: 0
BREAST MASS: 0
WEAKNESS: 0
CONSTIPATION: 0
FREQUENCY: 0
DYSURIA: 0
COUGH: 0
DIARRHEA: 0
FEVER: 0
EYE PAIN: 0
HEMATURIA: 0
SORE THROAT: 0
PARESTHESIAS: 0
ARTHRALGIAS: 1
DIZZINESS: 0
PALPITATIONS: 0
HEMATOCHEZIA: 0
HEADACHES: 0
NAUSEA: 0
CHILLS: 0
HEARTBURN: 0
SHORTNESS OF BREATH: 0
NERVOUS/ANXIOUS: 0
ABDOMINAL PAIN: 0

## 2021-06-15 ASSESSMENT — ACTIVITIES OF DAILY LIVING (ADL): CURRENT_FUNCTION: NO ASSISTANCE NEEDED

## 2021-06-15 ASSESSMENT — MIFFLIN-ST. JEOR: SCORE: 1224.36

## 2021-06-15 NOTE — PROGRESS NOTES
"SUBJECTIVE:   Lidia Jenkins is a 75 year old female who presents for Preventive Visit.    Are you in the first 12 months of your Medicare coverage?  No    Healthy Habits:     In general, how would you rate your overall health?  Excellent    Frequency of exercise:  4-5 days/week    Duration of exercise:  30-45 minutes    Do you usually eat at least 4 servings of fruit and vegetables a day, include whole grains    & fiber and avoid regularly eating high fat or \"junk\" foods?  Yes    Medication side effects:  None    Ability to successfully perform activities of daily living:  No assistance needed    Home Safety:  No safety concerns identified    Hearing Impairment:  Feel that people are mumbling or not speaking clearly and need to ask people to speak up or repeat themselves    In the past 6 months, have you been bothered by leaking of urine?  No    In general, how would you rate your overall mental or emotional health?  Excellent      PHQ-2 Total Score: 0    Additional concerns today:  Yes    Qantaflo testing done at home by insurance showed ? Mild PAD. Pt walks and notes no claudication. Wants to hold off on ultrasound testing.   Hgba1c 4.8.    Do you feel safe in your environment? Yes    Have you ever done Advance Care Planning? (For example, a Health Directive, POLST, or a discussion with a medical provider or your loved ones about your wishes): Yes, advance care planning is on file.      Fall risk  Fallen 2 or more times in the past year?: No  Any fall with injury in the past year?: No    Cognitive Screening   1) Repeat 3 items (Leader, Season, Table)    2) Clock draw: NORMAL  3) 3 item recall: Recalls 3 objects  Results: NORMAL clock, 1-2 items recalled: COGNITIVE IMPAIRMENT LESS LIKELY    Mini-CogTM Copyright MAXIME Spencer. Licensed by the author for use in [place]; reprinted with permission (maximiliano@.[facility]). All rights reserved.      Do you have sleep apnea, excessive snoring or daytime drowsiness?: " yes    Reviewed and updated as needed this visit by clinical staff                 Reviewed and updated as needed this visit by Provider                Social History     Tobacco Use     Smoking status: Never Smoker     Smokeless tobacco: Never Used   Substance Use Topics     Alcohol use: Yes     Alcohol/week: 4.0 standard drinks     Comment: 1-4 glasses of wine a week     If you drink alcohol do you typically have >3 drinks per day or >7 drinks per week? No    Alcohol Use 6/13/2021   Prescreen: >3 drinks/day or >7 drinks/week? No   Prescreen: >3 drinks/day or >7 drinks/week? -   No flowsheet data found.        Current providers sharing in care for this patient include:   Patient Care Team:  Jessica Cheema PA-C as PCP - General (Physician Assistant)  Ana Pantoja MD as MD (Family Medicine - Sports Medicine)  Castro Navarro MD as MD (Orthopedics)  Piedad Lloyd MD as MD (Family Practice)  Jessica Cheema PA-C as Assigned PCP    The following health maintenance items are reviewed in Epic and correct as of today:  Health Maintenance Due   Topic Date Due     ANNUAL REVIEW OF HM ORDERS  Never done     COVID-19 Vaccine (1) Never done     ADVANCE CARE PLANNING  06/27/2021       Review of Systems   Constitutional: Negative for chills and fever.   HENT: Negative for congestion, ear pain, hearing loss and sore throat.    Eyes: Negative for pain and visual disturbance.   Respiratory: Negative for cough and shortness of breath.    Cardiovascular: Negative for chest pain, palpitations and peripheral edema.   Gastrointestinal: Negative for abdominal pain, constipation, diarrhea, heartburn, hematochezia and nausea.   Breasts:  Negative for tenderness, breast mass and discharge.   Genitourinary: Negative for dysuria, frequency, genital sores, hematuria, pelvic pain, urgency, vaginal bleeding and vaginal discharge.   Musculoskeletal: Positive for arthralgias. Negative for joint swelling  "and myalgias.   Skin: Negative for rash.   Neurological: Negative for dizziness, weakness, headaches and paresthesias.   Psychiatric/Behavioral: Negative for mood changes. The patient is not nervous/anxious.          OBJECTIVE:   Physical Exam   /80 (BP Location: Right arm, Patient Position: Sitting, Cuff Size: Adult Regular)   Pulse 56   Temp 96.5  F (35.8  C) (Temporal)   Resp 16   Ht 1.626 m (5' 4\")   Wt 73.9 kg (163 lb)   SpO2 99%   BMI 27.98 kg/m    GENERAL: healthy, alert and no distress  EYES: Eyes grossly normal to inspection, conjunctivae and sclerae normal  HENT: ear canals and TM's normal, nose and mouth without ulcers or lesions  NECK: no adenopathy, no asymmetry, masses, or scars and thyroid normal to palpation  RESP: lungs clear to auscultation - no rales, rhonchi or wheezes  CV: regular rate and rhythm, normal S1 S2  ABDOMEN: soft, nontender, no hepatosplenomegaly, no masses and bowel sounds normal  MS: no gross musculoskeletal defects noted, no edema  SKIN: no suspicious lesions or rashes  NEURO: Normal strength and tone, mentation intact and speech normal  PSYCH: mentation appears normal, affect normal    Diagnostic Test Results:  Labs reviewed in Epic    ASSESSMENT / PLAN:       1. Encounter for Medicare annual wellness exam  Qantaflo testing done at home by insurance showed ? Mild PAD. Pt walks and notes no claudication. Wants to hold off on ultrasound testing. Pt declined claudication symptoms and didn't want US.   - REVIEW OF HEALTH MAINTENANCE PROTOCOL ORDERS    2. Hypertension goal BP (blood pressure) < 140/90  - stable   - Comprehensive metabolic panel (BMP + Alb, Alk Phos, ALT, AST, Total. Bili, TP)  - CBC with platelets  - atenolol (TENORMIN) 25 MG tablet; Take 1 tablet (25 mg) by mouth daily  Dispense: 90 tablet; Refill: 3  - Albumin Random Urine Quantitative with Creat Ratio    3. Hyperlipidemia, unspecified hyperlipidemia type  - stable   - Lipid Profile (Chol, Trig, HDL, " "LDL calc)  - lovastatin (MEVACOR) 20 MG tablet; Take 1 tablet (20 mg) by mouth At Bedtime  Dispense: 90 tablet; Refill: 3    Up to date with COVID vaccination    Patient has been advised of split billing requirements and indicates understanding: Yes  COUNSELING:  Reviewed preventive health counseling, as reflected in patient instructions    Estimated body mass index is 26.95 kg/m  as calculated from the following:    Height as of 8/17/20: 1.626 m (5' 4\").    Weight as of 8/17/20: 71.2 kg (157 lb).        She reports that she has never smoked. She has never used smokeless tobacco.      Appropriate preventive services were discussed with this patient, including applicable screening as appropriate for cardiovascular disease, diabetes, osteopenia/osteoporosis, and glaucoma.  As appropriate for age/gender, discussed screening for colorectal cancer, prostate cancer, breast cancer, and cervical cancer. Checklist reviewing preventive services available has been given to the patient.    Reviewed patients plan of care and provided an AVS. The Basic Care Plan (routine screening as documented in Health Maintenance) for Lidia meets the Care Plan requirement. This Care Plan has been established and reviewed with the Patient.    Counseling Resources:  ATP IV Guidelines  Pooled Cohorts Equation Calculator  Breast Cancer Risk Calculator  Breast Cancer: Medication to Reduce Risk  FRAX Risk Assessment  ICSI Preventive Guidelines  Dietary Guidelines for Americans, 2010  USDA's MyPlate  ASA Prophylaxis  Lung CA Screening    Piedad Lloyd MD  [facility]    Identified Health Risks:  "

## 2021-06-15 NOTE — PATIENT INSTRUCTIONS
Patient Education   Personalized Prevention Plan  You are due for the preventive services outlined below.  Your care team is available to assist you in scheduling these services.  If you have already completed any of these items, please share that information with your care team to update in your medical record.  Health Maintenance Due   Topic Date Due     ANNUAL REVIEW OF HM ORDERS  Never done     COVID-19 Vaccine (1) Never done     Discuss Advance Care Planning  06/27/2021

## 2021-06-16 ENCOUNTER — MYC MEDICAL ADVICE (OUTPATIENT)
Dept: FAMILY MEDICINE | Facility: CLINIC | Age: 76
End: 2021-06-16

## 2021-06-17 NOTE — TELEPHONE ENCOUNTER
Writer responded via [employer].    OLAMIDE LiraN, RN  [facility]

## 2021-06-25 ENCOUNTER — TELEPHONE (OUTPATIENT)
Dept: FAMILY MEDICINE | Facility: CLINIC | Age: 76
End: 2021-06-25
Payer: COMMERCIAL

## 2021-06-25 NOTE — TELEPHONE ENCOUNTER
[facility] Call Center    Phone Message    May a detailed message be left on voicemail: no     Reason for Call: Other: `      Pt states she liked Dr Hannah very much and is sad to see her go. Asked that I pass on message.    Action Taken: Other: [facility]    Travel Screening: Not Applicable

## 2021-07-29 ENCOUNTER — MYC MEDICAL ADVICE (OUTPATIENT)
Dept: FAMILY MEDICINE | Facility: CLINIC | Age: 76
End: 2021-07-29

## 2021-08-01 ENCOUNTER — OFFICE VISIT (OUTPATIENT)
Dept: URGENT CARE | Facility: URGENT CARE | Age: 76
End: 2021-08-01
Payer: COMMERCIAL

## 2021-08-01 VITALS
HEIGHT: 65 IN | TEMPERATURE: 98.9 F | WEIGHT: 160 LBS | OXYGEN SATURATION: 95 % | BODY MASS INDEX: 26.66 KG/M2 | SYSTOLIC BLOOD PRESSURE: 131 MMHG | HEART RATE: 67 BPM | DIASTOLIC BLOOD PRESSURE: 83 MMHG

## 2021-08-01 DIAGNOSIS — L30.9 DERMATITIS: Primary | ICD-10-CM

## 2021-08-01 PROCEDURE — 99213 OFFICE O/P EST LOW 20 MIN: CPT | Performed by: INTERNAL MEDICINE

## 2021-08-01 ASSESSMENT — MIFFLIN-ST. JEOR: SCORE: 1218.7

## 2021-08-01 NOTE — PROGRESS NOTES
"    Assessment & Plan     Dermatitis  Differential diagnosis for this pruritic exanthem associated with an apparent insect bite includes erythema migrans, hypersensitivity dermatitis, cellulitis.  The physical exam rules out cellulitis and the history makes Lyme unlikely as she has had no geographic exposure that would pose any real risk of a deer tick bite.  The characteristics of the pruritic rash are more consistent with a hypersensitivity response than a cellulitis.  I do not see an indication for doxycycline at this point and have educated the patient on signs/symptoms to monitor for that would be indicative of secondary Lyme however this is very unlikely.    Rick Montano MD  [facility]    Subjective     HPI   She had a bite on the right breast a few weeks ago.  Initially this was just a small red bump however over the next two weeks she noted some expanding redness around this spot.  Some itchiness.  Now fading somewhat. She has only been in the [place] during this span of time and denies being in woods or tall grass.      Review of Systems   Constitutional, HEENT, cardiovascular, pulmonary, gi and gu systems are negative, except as otherwise noted.      Objective    /83   Pulse 67   Temp 98.9  F (37.2  C) (Oral)   Ht 1.638 m (5' 4.5\")   Wt 72.6 kg (160 lb)   SpO2 95%   BMI 27.04 kg/m    Body mass index is 27.04 kg/m .  Physical Exam   GENERAL APPEARANCE: healthy, alert and no distress  SKIN: there is a central pale erythematous papule surrounded by a pale blanching erythematous macular region that is not warm or tender; no central clearing  LYMPH: no axillary lymphadenopathy           "

## 2021-08-01 NOTE — PATIENT INSTRUCTIONS
Because you have not had any circumstance that has any likelihood of a deer tick exposure, this really looks like an allergic reaction to an insect bite.      Can use topical steroid cream like hydrocortisone if you want to.

## 2021-08-17 ENCOUNTER — MYC MEDICAL ADVICE (OUTPATIENT)
Dept: FAMILY MEDICINE | Facility: CLINIC | Age: 76
End: 2021-08-17

## 2021-08-20 ENCOUNTER — OFFICE VISIT (OUTPATIENT)
Dept: FAMILY MEDICINE | Facility: CLINIC | Age: 76
End: 2021-08-20
Payer: COMMERCIAL

## 2021-08-20 VITALS — DIASTOLIC BLOOD PRESSURE: 80 MMHG | SYSTOLIC BLOOD PRESSURE: 122 MMHG

## 2021-08-20 DIAGNOSIS — D22.9 MULTIPLE BENIGN NEVI: ICD-10-CM

## 2021-08-20 DIAGNOSIS — L82.1 SEBORRHEIC KERATOSES: ICD-10-CM

## 2021-08-20 DIAGNOSIS — Z85.828 HISTORY OF BASAL CELL CARCINOMA (BCC): Primary | ICD-10-CM

## 2021-08-20 DIAGNOSIS — L81.4 LENTIGINES: ICD-10-CM

## 2021-08-20 DIAGNOSIS — D18.01 CHERRY ANGIOMA: ICD-10-CM

## 2021-08-20 PROCEDURE — 99213 OFFICE O/P EST LOW 20 MIN: CPT | Performed by: PHYSICIAN ASSISTANT

## 2021-08-20 NOTE — PATIENT INSTRUCTIONS
Proper skin care from [facility]:    -Eliminate harsh soaps as they strip the natural oils from the skin, often resulting in dry itchy skin ( i.e. Dial, Zest, Yas Spring)  -Use mild soaps such as Cetaphil or Dove Sensitive Skin in the shower. You do not need to use soap on arms, legs, and trunk every time you shower unless visibly soiled.   -Avoid hot or cold showers.  -After showering, lightly dry off and apply moisturizing within 2-3 minutes. This will help trap moisture in the skin.   -Aggressive use of a moisturizer at least 1-2 times a day to the entire body (including -Vanicream, Cetaphil, Aquaphor or Cerave) and moisturize hands after every washing.  -We recommend using moisturizers that come in a tub that needs to be scooped out, not a pump. This has more of an oil base. It will hold moisture in your skin much better than a water base moisturizer. The above recommended are non-pore clogging.      Wear a sunscreen with at least SPF 30 on your face, ears, neck and V of the chest daily. Wear sunscreen on other areas of the body if those areas are exposed to the sun throughout the day. Sunscreens can contain physical and/or chemical blockers. Physical blockers are less likely to clog pores, these include zinc oxide and titanium dioxide. Reapply every two hour and after swimming.     Sunscreen examples: https://www.ewg.org/sunscreen/    UV radiation  UVA radiation remains constant throughout the day and throughout the year. It is a longer wavelength than UVB and therefore penetrates deeper into the skin leading to immediate and delayed tanning, photoaging, and skin cancer. 70-80% of UVA and UVB radiation occurs between the hours of 10am-2pm.  UVB radiation  UVB radiation causes the most harmful effects and is more significant during the summer months. However, snow and ice can reflect UVB radiation leading to skin damage during the winter months as well. UVB radiation is responsible for tanning,  burning, inflammation, delayed erythema (pinkness), pigmentation (brown spots), and skin cancer.     I recommend self monthly full body exams and yearly full body exams with a dermatology provider. If you develop a new or changing lesion please follow up for examination. Most skin cancers are pink and scaly or pink and pearly. However, we do see blue/brown/black skin cancers.  Consider the ABCDEs of melanoma when giving yourself your monthly full body exam ( don't forget the groin, buttocks, feet, toes, etc). A-asymmetry, B-borders, C-color, D-diameter, E-elevation or evolving. If you see any of these changes please follow up in clinic. If you cannot see your back I recommend purchasing a hand held mirror to use with a larger wall mirror.      Diego

## 2021-08-20 NOTE — LETTER
8/20/2021         RE: Lidia Jenkins  [address] [place], [place] [id number]        Dear Colleague,    Thank you for referring your patient, Lidia Jenkins, to the [facility]. Please see a copy of my visit note below.    HPI:  Lidia Jenkins is a 76 year old female patient here today for FBE. Has a spot on right anterior shoulder .  Patient states this has been present for a while.  Patient reports the following symptoms: bothersome .  Patient reports the following previous treatments: none.  Patient reports the following modifying factors: none.  Associated symptoms: none.  Patient has no other skin complaints today.  Remainder of the HPI, Meds, PMH, Allergies, FH, and SH was reviewed in chart.    Pertinent Hx:   BCC on back and right patella. Psoriasis.   Past Medical History:   Diagnosis Date     Arthritis      Basal cell carcinoma      Hyperlipidaemia LDL goal < 100      Hypertension goal BP (blood pressure) < 140/90      Psoriasis        Past Surgical History:   Procedure Laterality Date     ABDOMEN SURGERY  1987     COLONOSCOPY  2012     RELEASE TRIGGER FINGER Left 8/15/2019    Procedure: Left Middle Finger Trigger Release;  Surgeon: Castro Navarro MD;  Location:  OR        Family History   Problem Relation Age of Onset     Breast Cancer Mother      Hypertension Mother      Hyperlipidemia Mother      Depression Mother      Hypertension Brother      Hyperlipidemia Brother      Hypertension Sister      Hyperlipidemia Sister      Coronary Artery Disease Father         MI     Hypertension Father      Hyperlipidemia Father      Substance Abuse Father      Coronary Artery Disease Brother      Hypertension Brother      Substance Abuse Brother      Hyperlipidemia Brother      Hypertension Sister      Hyperlipidemia Sister      Depression Sister      Substance Abuse Sister      Other Cancer No family hx of        Social History     Socioeconomic History     Marital status:  Single     Spouse name: Not on file     Number of children: Not on file     Years of education: Not on file     Highest education level: Not on file   Occupational History     Not on file   Tobacco Use     Smoking status: Never Smoker     Smokeless tobacco: Never Used   Substance and Sexual Activity     Alcohol use: Yes     Alcohol/week: 4.0 standard drinks     Comment: 1-4 glasses of wine a week     Drug use: No     Sexual activity: Not Currently   Other Topics Concern     Parent/sibling w/ CABG, MI or angioplasty before 65F 55M? Yes     Comment: father, brother   Social History Narrative     Not on file     Social Determinants of Health     Financial Resource Strain:      Difficulty of Paying Living Expenses:    Food Insecurity:      Worried About Running Out of Food in the Last Year:      Ran Out of Food in the Last Year:    Transportation Needs:      Lack of Transportation (Medical):      Lack of Transportation (Non-Medical):    Physical Activity:      Days of Exercise per Week:      Minutes of Exercise per Session:    Stress:      Feeling of Stress :    Social Connections:      Frequency of Communication with Friends and Family:      Frequency of Social Gatherings with Friends and Family:      Attends Worship Services:      Active Member of Clubs or Organizations:      Attends Club or Organization Meetings:      Marital Status:    Intimate Partner Violence:      Fear of Current or Ex-Partner:      Emotionally Abused:      Physically Abused:      Sexually Abused:        Outpatient Encounter Medications as of 8/20/2021   Medication Sig Dispense Refill     atenolol (TENORMIN) 25 MG tablet Take 1 tablet (25 mg) by mouth daily 90 tablet 3     cholecalciferol (VITAMIN D) 1000 UNIT tablet Take 2 tablets (2,000 Units) by mouth daily 100 tablet 3     lovastatin (MEVACOR) 20 MG tablet Take 1 tablet (20 mg) by mouth At Bedtime 90 tablet 3     No facility-administered encounter medications on file as of 8/20/2021.        Review Of Systems:  Skin: spot  Eyes: negative  Ears/Nose/Throat: negative  Respiratory: No shortness of breath, dyspnea on exertion, cough, or hemoptysis  Cardiovascular: negative  Gastrointestinal: negative  Genitourinary: negative  Musculoskeletal: negative  Neurologic: negative  Psychiatric: negative  Hematologic/Lymphatic/Immunologic: negative  Endocrine: negative      Objective:     There were no vitals taken for this visit.  Eyes: Conjunctivae/lids: Normal   ENT: Lips:  Normal  MSK: Normal  Cardiovascular: Peripheral edema none  Pulm: Breathing Normal  Neuro/Psych: Orientation: A/O x 3. Normal; Mood/Affect: Normal, NAD, WDWN  Pt accompanied by: self  Following areas examined: Scalp, face, eyelids, lips, neck, chest, abdomen, back, R&L upper and lower extremities, buttock, hips.  Pt defers exam of groin and genitals.   Ng skin type:ii   Findings:  Red smooth well-defined macules on trunk and extremities.  Brown, stuck-on scaly appearing papules on right anterior shoulder, trunk and extremities.  Well circumscribed macules with symmetric color distribution on trunk and extremities.  Tan WD smooth macules on face, neck, trunk, and extremities.      Assessment and Plan:     1) Cherry angiomas, Seborrheic keratoses, Benign nevi, Lentigines   sk on shoulder-disc cryo. Pt defers. Consider otc cryo.      I discussed the specifics of tumor, prognosis, and genetics of benign lesions.  I explained that treatment of these lesions would be purely cosmetic and not medically neccessary.  I discussed with patient different removal options including excision, cryotherapy, cautery and /or laser.  Lesion may recur and/or may not completely resolve. May need additional treatment.     2) history of BCC  BCC on back and right patella  Signs and Symptoms of non-melanoma skin cancer and ABCDEs of melanoma reviewed with patient. Patient encouraged to perform monthly self skin exams and educated on how to perform them.  UV precautions reviewed with patient. Patient was asked about new or changing moles/lesions on body.   Wear a sunscreen with at least SPF 30 on your face, ears, neck and V of the chest daily. Wear sunscreen on other areas of the body if those areas are exposed to the sun throughout the day. Sunscreens can contain physical and/or chemical blockers. Physical blockers are less likely to clog pores, these include zinc oxide and titanium dioxide. Reapply every two hour and after swimming.Sunscreen examples: https://www.ewg.org/sunscreen/    Proper skin care from [facility]:    -Eliminate harsh soaps as they strip the natural oils from the skin, often resulting in dry itchy skin ( i.e. Dial, Zest, Yas Spring)  -Use mild soaps such as Cetaphil or Dove Sensitive Skin in the shower. You do not need to use soap on arms, legs, and trunk every time you shower unless visibly soiled.   -Avoid hot or cold showers.  -After showering, lightly dry off and apply moisturizing within 2-3 minutes. This will help trap moisture in the skin.   -Aggressive use of a moisturizer at least 1-2 times a day to the entire body (including -Vanicream, Cetaphil, Aquaphor or Cerave) and moisturize hands after every washing.  -We recommend using moisturizers that come in a tub that needs to be scooped out, not a pump. This has more of an oil base. It will hold moisture in your skin much better than a water base moisturizer. The above recommended are non-pore clogging.         It was a pleasure speaking with Lidia Jenkins today.       Follow up in yearly FBE        Again, thank you for allowing me to participate in the care of your patient.        Sincerely,        Ana Gaytan PA-C

## 2021-08-20 NOTE — PROGRESS NOTES
HPI:  Lidia Jenkins is a 76 year old female patient here today for FBE. Has a spot on right anterior shoulder .  Patient states this has been present for a while.  Patient reports the following symptoms: bothersome .  Patient reports the following previous treatments: none.  Patient reports the following modifying factors: none.  Associated symptoms: none.  Patient has no other skin complaints today.  Remainder of the HPI, Meds, PMH, Allergies, FH, and SH was reviewed in chart.    Pertinent Hx:   BCC on back and right patella. Psoriasis.   Past Medical History:   Diagnosis Date     Arthritis      Basal cell carcinoma      Hyperlipidaemia LDL goal < 100      Hypertension goal BP (blood pressure) < 140/90      Psoriasis        Past Surgical History:   Procedure Laterality Date     ABDOMEN SURGERY  1987     COLONOSCOPY  2012     RELEASE TRIGGER FINGER Left 8/15/2019    Procedure: Left Middle Finger Trigger Release;  Surgeon: Castro Navarro MD;  Location: [facility] OR        Family History   Problem Relation Age of Onset     Breast Cancer Mother      Hypertension Mother      Hyperlipidemia Mother      Depression Mother      Hypertension Brother      Hyperlipidemia Brother      Hypertension Sister      Hyperlipidemia Sister      Coronary Artery Disease Father         MI     Hypertension Father      Hyperlipidemia Father      Substance Abuse Father      Coronary Artery Disease Brother      Hypertension Brother      Substance Abuse Brother      Hyperlipidemia Brother      Hypertension Sister      Hyperlipidemia Sister      Depression Sister      Substance Abuse Sister      Other Cancer No family hx of        Social History     Socioeconomic History     Marital status: Single     Spouse name: Not on file     Number of children: Not on file     Years of education: Not on file     Highest education level: Not on file   Occupational History     Not on file   Tobacco Use     Smoking status: Never Smoker     Smokeless tobacco:  Never Used   Substance and Sexual Activity     Alcohol use: Yes     Alcohol/week: 4.0 standard drinks     Comment: 1-4 glasses of wine a week     Drug use: No     Sexual activity: Not Currently   Other Topics Concern     Parent/sibling w/ CABG, MI or angioplasty before 65F 55M? Yes     Comment: father, brother   Social History Narrative     Not on file     Social Determinants of Health     Financial Resource Strain:      Difficulty of Paying Living Expenses:    Food Insecurity:      Worried About Running Out of Food in the Last Year:      Ran Out of Food in the Last Year:    Transportation Needs:      Lack of Transportation (Medical):      Lack of Transportation (Non-Medical):    Physical Activity:      Days of Exercise per Week:      Minutes of Exercise per Session:    Stress:      Feeling of Stress :    Social Connections:      Frequency of Communication with Friends and Family:      Frequency of Social Gatherings with Friends and Family:      Attends [employer] Services:      Active Member of Clubs or Organizations:      Attends Club or Organization Meetings:      Marital Status:    Intimate Partner Violence:      Fear of Current or Ex-Partner:      Emotionally Abused:      Physically Abused:      Sexually Abused:        Outpatient Encounter Medications as of 8/20/2021   Medication Sig Dispense Refill     atenolol (TENORMIN) 25 MG tablet Take 1 tablet (25 mg) by mouth daily 90 tablet 3     cholecalciferol (VITAMIN D) 1000 UNIT tablet Take 2 tablets (2,000 Units) by mouth daily 100 tablet 3     lovastatin (MEVACOR) 20 MG tablet Take 1 tablet (20 mg) by mouth At Bedtime 90 tablet 3     No facility-administered encounter medications on file as of 8/20/2021.       Review Of Systems:  Skin: spot  Eyes: negative  Ears/Nose/Throat: negative  Respiratory: No shortness of breath, dyspnea on exertion, cough, or hemoptysis  Cardiovascular: negative  Gastrointestinal: negative  Genitourinary: negative  Musculoskeletal:  negative  Neurologic: negative  Psychiatric: negative  Hematologic/Lymphatic/Immunologic: negative  Endocrine: negative      Objective:     There were no vitals taken for this visit.  Eyes: Conjunctivae/lids: Normal   ENT: Lips:  Normal  MSK: Normal  Cardiovascular: Peripheral edema none  Pulm: Breathing Normal  Neuro/Psych: Orientation: A/O x 3. Normal; Mood/Affect: Normal, NAD, WDWN  Pt accompanied by: self  Following areas examined: Scalp, face, eyelids, lips, neck, chest, abdomen, back, R&L upper and lower extremities, buttock, hips.  Pt defers exam of groin and genitals.   Ng skin type:ii   Findings:  Red smooth well-defined macules on trunk and extremities.  Brown, stuck-on scaly appearing papules on right anterior shoulder, trunk and extremities.  Well circumscribed macules with symmetric color distribution on trunk and extremities.  Tan WD smooth macules on face, neck, trunk, and extremities.      Assessment and Plan:     1) Cherry angiomas, Seborrheic keratoses, Benign nevi, Lentigines   sk on shoulder-disc cryo. Pt defers. Consider otc cryo.      I discussed the specifics of tumor, prognosis, and genetics of benign lesions.  I explained that treatment of these lesions would be purely cosmetic and not medically neccessary.  I discussed with patient different removal options including excision, cryotherapy, cautery and /or laser.  Lesion may recur and/or may not completely resolve. May need additional treatment.     2) history of BCC  BCC on back and right patella  Signs and Symptoms of non-melanoma skin cancer and ABCDEs of melanoma reviewed with patient. Patient encouraged to perform monthly self skin exams and educated on how to perform them. UV precautions reviewed with patient. Patient was asked about new or changing moles/lesions on body.   Wear a sunscreen with at least SPF 30 on your face, ears, neck and V of the chest daily. Wear sunscreen on other areas of the body if those areas are  exposed to the sun throughout the day. Sunscreens can contain physical and/or chemical blockers. Physical blockers are less likely to clog pores, these include zinc oxide and titanium dioxide. Reapply every two hour and after swimming.Sunscreen examples: https://www.ewg.org/sunscreen/    Proper skin care from [facility]:    -Eliminate harsh soaps as they strip the natural oils from the skin, often resulting in dry itchy skin ( i.e. Dial, Zest, [place] Spring)  -Use mild soaps such as Cetaphil or Dove Sensitive Skin in the shower. You do not need to use soap on arms, legs, and trunk every time you shower unless visibly soiled.   -Avoid hot or cold showers.  -After showering, lightly dry off and apply moisturizing within 2-3 minutes. This will help trap moisture in the skin.   -Aggressive use of a moisturizer at least 1-2 times a day to the entire body (including -Vanicream, Cetaphil, Aquaphor or Cerave) and moisturize hands after every washing.  -We recommend using moisturizers that come in a tub that needs to be scooped out, not a pump. This has more of an oil base. It will hold moisture in your skin much better than a water base moisturizer. The above recommended are non-pore clogging.         It was a pleasure speaking with Lidia Jenkins today.       Follow up in yearly FBE

## 2021-10-04 ENCOUNTER — HEALTH MAINTENANCE LETTER (OUTPATIENT)
Age: 76
End: 2021-10-04

## 2021-12-02 ENCOUNTER — MYC MEDICAL ADVICE (OUTPATIENT)
Dept: FAMILY MEDICINE | Facility: CLINIC | Age: 76
End: 2021-12-02
Payer: COMMERCIAL

## 2021-12-03 ENCOUNTER — APPOINTMENT (OUTPATIENT)
Dept: URGENT CARE | Facility: CLINIC | Age: 76
End: 2021-12-03
Payer: COMMERCIAL

## 2022-05-03 NOTE — TELEPHONE ENCOUNTER
DIAGNOSIS: lower back pain / self referred / [facility] / no imaging   APPOINTMENT DATE: 5.13.22   NOTES STATUS DETAILS   MEDICATION LIST Internal    MRI Internal 3.1.16 sacrum and coccyx   DEXA (osteoporosis/bone health) Internal    XRAYS (IMAGES & REPORTS) Internal 2.23.16 sacrum and coccyx

## 2022-05-12 DIAGNOSIS — M54.50 LOW BACK PAIN: Primary | ICD-10-CM

## 2022-05-13 ENCOUNTER — ANCILLARY PROCEDURE (OUTPATIENT)
Dept: GENERAL RADIOLOGY | Facility: CLINIC | Age: 77
End: 2022-05-13
Attending: FAMILY MEDICINE
Payer: COMMERCIAL

## 2022-05-13 ENCOUNTER — PRE VISIT (OUTPATIENT)
Dept: ORTHOPEDICS | Facility: CLINIC | Age: 77
End: 2022-05-13
Payer: COMMERCIAL

## 2022-05-13 ENCOUNTER — OFFICE VISIT (OUTPATIENT)
Dept: ORTHOPEDICS | Facility: CLINIC | Age: 77
End: 2022-05-13
Payer: COMMERCIAL

## 2022-05-13 VITALS — BODY MASS INDEX: 26.66 KG/M2 | WEIGHT: 160 LBS | HEIGHT: 65 IN

## 2022-05-13 DIAGNOSIS — M54.50 CHRONIC RIGHT-SIDED LOW BACK PAIN, UNSPECIFIED WHETHER SCIATICA PRESENT: Primary | ICD-10-CM

## 2022-05-13 DIAGNOSIS — M25.551 CHRONIC RIGHT HIP PAIN: ICD-10-CM

## 2022-05-13 DIAGNOSIS — G89.29 CHRONIC RIGHT-SIDED LOW BACK PAIN, UNSPECIFIED WHETHER SCIATICA PRESENT: Primary | ICD-10-CM

## 2022-05-13 DIAGNOSIS — M54.50 LOW BACK PAIN: ICD-10-CM

## 2022-05-13 DIAGNOSIS — G89.29 CHRONIC RIGHT HIP PAIN: ICD-10-CM

## 2022-05-13 PROCEDURE — 72100 X-RAY EXAM L-S SPINE 2/3 VWS: CPT | Performed by: RADIOLOGY

## 2022-05-13 PROCEDURE — 99204 OFFICE O/P NEW MOD 45 MIN: CPT | Performed by: FAMILY MEDICINE

## 2022-05-13 NOTE — PROGRESS NOTES
CHIEF COMPLAINT:  Pain of the Spine       HISTORY OF PRESENT ILLNESS  Ms. Jenkins is a pleasant 76 year old year old female who presents to clinic today with lower back pain.  Lidia explains that She has been experiencing midline low back pain while standing from a chair for about 5 months. 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 No    Do you have any vision problems? No    Do you have any chest pain or edema? No    Do you have any shortness of breath or wheezing?  No    Do you have stomach problems? No    Do you have any numbness or focal weakness? No    Do you have diabetes? No    Do you have problems with bleeding or clotting? No    Do you have an rashes or other skin lesions? No    MEDICAL HISTORY  Patient Active Problem List   Diagnosis     Hypertension goal BP (blood pressure) < 140/90     History of basal cell carcinoma     Advance care planning       Current Outpatient Medications   Medication Sig Dispense Refill     atenolol (TENORMIN) 25 MG tablet Take 1 tablet (25 mg) by mouth daily 90 tablet 3     cholecalciferol (VITAMIN D) 1000 UNIT tablet Take 2 tablets (2,000 Units) by mouth daily 100 tablet 3     lovastatin (MEVACOR) 20 MG tablet Take 1 tablet (20 mg) by mouth At Bedtime 90 tablet 3       No Known Allergies    Family History   Problem Relation Age of Onset     Breast Cancer Mother      Hypertension Mother      Hyperlipidemia Mother      Depression Mother       "Hypertension Brother      Hyperlipidemia Brother      Hypertension Sister      Hyperlipidemia Sister      Coronary Artery Disease Father         MI     Hypertension Father      Hyperlipidemia Father      Substance Abuse Father      Coronary Artery Disease Brother      Hypertension Brother      Substance Abuse Brother      Hyperlipidemia Brother      Hypertension Sister      Hyperlipidemia Sister      Depression Sister      Substance Abuse Sister      Other Cancer No family hx of        Additional medical/Social/Surgical histories reviewed in [facility] and updated as appropriate.       PHYSICAL EXAM  Ht 1.638 m (5' 4.5\")   Wt 72.6 kg (160 lb)   BMI 27.04 kg/m      General  - normal appearance, in no obvious distress  CV  - normal peripheral perfusion  Pulm  - normal respiratory pattern, non-labored  Musculoskeletal - lumbar spine  - stance: normal gait without limp, no obvious leg length discrepancy, normal heel and toe walk  - inspection: normal bone and joint alignment, no obvious scoliosis  - palpation: no paravertebral or bony tenderness  - ROM: normal flexion, extension, sidebending, rotation  - strength: lower extremities 5/5 in all planes  - special tests:  (-) straight leg raise  (-) slump test  Musculoskeletal - right hip  - inspection: no swelling of anterolateral hip,  normal bone and joint alignment, no obvious deformity  - palpation: no lateral or anterior hip tenderness  - ROM: Pain and limited internal and external rotation of right hip. Pain to groin  - strength: 5/5 in all planes  - special tests:  (-) RICHARD  (+) FADIR groin  no pain with axial femoral load  Neuro  - No lower extremity sensory deficits, grossly normal coordination, normal muscle tone  Skin  - no ecchymosis, erythema, warmth, or induration, no obvious rash  Psych  - interactive, appropriate, normal mood and affect    IMAGING : XR lumbar 2V with AP pelvis. Final results and radiologist's interpretation, available in the [facility] " record. Images were reviewed with the patient/family members in the office today. My personal interpretation of the performed imaging is moderate osteoarthritis of right hip. Low lumbar facet changes with disc narrowing L5-S1.     ASSESSMENT & PLAN  Ms. Jenkins is a 76 year old year old female who presents to clinic today with right hip as well as right-sided lumbar pain over the past 6 months.    I suspect separate etiologies and she does have evidence for hip osteoarthritis on x-ray.  This is also consistent with provocative intra-articular hip pain.    Additionally her lumbar exam was normal, but pain only comes about after prolonged sitting to standing.  I suspect discogenic or facet agenic pain with +/- nerve root impingement.    Diagnosis:   Chronic low back pain, intermittent  Primary osteoarthritis right hip    Treat options discussed with Lidia at this time I recommended starting with formal physical therapy.  Lumbar as well as hip strengthening and stabilization program should be implemented together.  I would like to see what percentage of her pain does improve we can discuss addressing either issue if pain lingers.  Hip corticosteroid injection could be considered.  Additionally in the lumbar spine an MRI would be our next reasonable step.    All questions were answered to patient's satisfaction.    It was a pleasure seeing Lidia today.    Willi Neal DO, CAQSM  Primary Care Sports Medicine    48 minutes on date of the encounter doing chart review, history and examination, independent imaging review, documentation, and additional activities noted above.

## 2022-05-13 NOTE — LETTER
5/13/2022      RE: Lidia Jenkins  [address] [place] [id number]     Dear Colleague,    Thank you for referring your patient, Lidia Jenkins, to the [facility] [place]. Please see a copy of my visit note below.    CHIEF COMPLAINT:  Pain of the Spine       HISTORY OF PRESENT ILLNESS  Ms. Jenkins is a pleasant 76 year old year old female who presents to clinic today with lower back pain.  Lidia explains that She has been experiencing midline low back pain while standing from a chair for about 5 months. 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 No    Do you have any vision problems? No    Do you have any chest pain or edema? No    Do you have any shortness of breath or wheezing?  No    Do you have stomach problems? No    Do you have any numbness or focal weakness? No    Do you have diabetes? No    Do you have problems with bleeding or clotting? No    Do you have an rashes or other skin lesions? No    MEDICAL HISTORY  Patient Active Problem List   Diagnosis     Hypertension goal BP (blood pressure) < 140/90     History of basal cell carcinoma     Advance care planning       Current Outpatient Medications   Medication Sig Dispense Refill     atenolol (TENORMIN) 25 MG tablet Take 1 tablet (25 mg) by mouth daily 90 tablet 3     cholecalciferol (VITAMIN D) 1000 UNIT tablet Take 2 tablets (2,000 Units) by mouth daily 100 tablet 3      "lovastatin (MEVACOR) 20 MG tablet Take 1 tablet (20 mg) by mouth At Bedtime 90 tablet 3       No Known Allergies    Family History   Problem Relation Age of Onset     Breast Cancer Mother      Hypertension Mother      Hyperlipidemia Mother      Depression Mother      Hypertension Brother      Hyperlipidemia Brother      Hypertension Sister      Hyperlipidemia Sister      Coronary Artery Disease Father         MI     Hypertension Father      Hyperlipidemia Father      Substance Abuse Father      Coronary Artery Disease Brother      Hypertension Brother      Substance Abuse Brother      Hyperlipidemia Brother      Hypertension Sister      Hyperlipidemia Sister      Depression Sister      Substance Abuse Sister      Other Cancer No family hx of        Additional medical/Social/Surgical histories reviewed in [facility] and updated as appropriate.       PHYSICAL EXAM  Ht 1.638 m (5' 4.5\")   Wt 72.6 kg (160 lb)   BMI 27.04 kg/m      General  - normal appearance, in no obvious distress  CV  - normal peripheral perfusion  Pulm  - normal respiratory pattern, non-labored  Musculoskeletal - lumbar spine  - stance: normal gait without limp, no obvious leg length discrepancy, normal heel and toe walk  - inspection: normal bone and joint alignment, no obvious scoliosis  - palpation: no paravertebral or bony tenderness  - ROM: normal flexion, extension, sidebending, rotation  - strength: lower extremities 5/5 in all planes  - special tests:  (-) straight leg raise  (-) slump test  Musculoskeletal - right hip  - inspection: no swelling of anterolateral hip,  normal bone and joint alignment, no obvious deformity  - palpation: no lateral or anterior hip tenderness  - ROM: Pain and limited internal and external rotation of right hip. Pain to groin  - strength: 5/5 in all planes  - special tests:  (-) RICHARD  (+) FADIR groin  no pain with axial femoral load  Neuro  - No lower extremity sensory deficits, grossly normal coordination, normal " muscle tone  Skin  - no ecchymosis, erythema, warmth, or induration, no obvious rash  Psych  - interactive, appropriate, normal mood and affect    IMAGING : XR lumbar 2V with AP pelvis. Final results and radiologist's interpretation, available in the [facility] health record. Images were reviewed with the patient/family members in the office today. My personal interpretation of the performed imaging is moderate osteoarthritis of right hip. Low lumbar facet changes with disc narrowing L5-S1.     ASSESSMENT & PLAN  Ms. Jenkins is a 76 year old year old female who presents to clinic today with right hip as well as right-sided lumbar pain over the past 6 months.    I suspect separate etiologies and she does have evidence for hip osteoarthritis on x-ray.  This is also consistent with provocative intra-articular hip pain.    Additionally her lumbar exam was normal, but pain only comes about after prolonged sitting to standing.  I suspect discogenic or facet agenic pain with +/- nerve root impingement.    Diagnosis:   Chronic low back pain, intermittent  Primary osteoarthritis right hip    Treat options discussed with Lidia at this time I recommended starting with formal physical therapy.  Lumbar as well as hip strengthening and stabilization program should be implemented together.  I would like to see what percentage of her pain does improve we can discuss addressing either issue if pain lingers.  Hip corticosteroid injection could be considered.  Additionally in the lumbar spine an MRI would be our next reasonable step.    All questions were answered to patient's satisfaction.    It was a pleasure seeing Lidia today.    Willi Neal DO, [facility]  Primary Care Sports Medicine    48 minutes on date of the encounter doing chart review, history and examination, independent imaging review, documentation, and additional activities noted above.        Again, thank you for allowing me to participate in the care of your patient.       Sincerely,    Willi Neal, DO

## 2022-05-24 ENCOUNTER — THERAPY VISIT (OUTPATIENT)
Dept: PHYSICAL THERAPY | Facility: CLINIC | Age: 77
End: 2022-05-24
Payer: COMMERCIAL

## 2022-05-24 DIAGNOSIS — G89.29 CHRONIC RIGHT HIP PAIN: ICD-10-CM

## 2022-05-24 DIAGNOSIS — M25.551 CHRONIC RIGHT HIP PAIN: ICD-10-CM

## 2022-05-24 DIAGNOSIS — G89.29 CHRONIC RIGHT-SIDED LOW BACK PAIN, UNSPECIFIED WHETHER SCIATICA PRESENT: ICD-10-CM

## 2022-05-24 DIAGNOSIS — M54.50 CHRONIC RIGHT-SIDED LOW BACK PAIN, UNSPECIFIED WHETHER SCIATICA PRESENT: ICD-10-CM

## 2022-05-24 PROCEDURE — 97161 PT EVAL LOW COMPLEX 20 MIN: CPT | Mod: GP

## 2022-05-24 PROCEDURE — 97110 THERAPEUTIC EXERCISES: CPT | Mod: GP

## 2022-05-24 NOTE — PROGRESS NOTES
[facility]    OUTPATIENT Physical Therapy ORTHOPEDIC EVALUATION  PLAN OF TREATMENT FOR OUTPATIENT REHABILITATION  (COMPLETE FOR INITIAL CLAIMS ONLY)  Patient's Last Name, First Name, M.I.  YOB: 1945  Lidia Jenkins    Provider s Name:  [facility]   Medical Record No.  [id number]   Start of Care Date:  05/24/22   Onset Date:   05/13/22 (Order date)   Type:     _X__PT   ___OT Medical Diagnosis:    Encounter Diagnoses   Name Primary?    Chronic right-sided low back pain, unspecified whether sciatica present     Chronic right hip pain         Treatment Diagnosis:  LBP / R hip        Goals:     05/24/22 0500   Body Part   Goals listed below are for LBP / R hip   Goal #1   Goal #1 standing   Previous Functional Level No restrictions   Performance level Pain-free sit <> stands 100% of the time   Current Functional Level   (Painful sit <> stands 25% of the time)   Performance level Pain 10/10   STG [employer] Performance   (Sit <> stands)   Performance level Pain 5/10 <25% of the time   Rationale for personal hygiene  (to allow rest from standing)   Due date 06/20/22   LTG [employer] Performance   (Sit <> stands)   Performance Level Pain <2/10 100% of the time   Rationale   (To allow rest from standing / sitting)   Due date 07/24/22       Therapy Frequency:  1x/week  Predicted Duration of Therapy Intervention:  x4 weeks tapering to 2x/month x 1 month    Letty Bergeron, PT                 I CERTIFY THE NEED FOR THESE SERVICES FURNISHED UNDER        THIS PLAN OF TREATMENT AND WHILE UNDER MY CARE     (Physician attestation of this document indicates review and certification of the therapy plan).                     Certification Date From:  05/24/22   Certification Date To:  07/24/22    Referring Provider:  Willi Neal    Initial Assessment        See Epic Evaluation SOC Date:  05/24/22

## 2022-05-24 NOTE — PROGRESS NOTES
"Physical Therapy Initial Examination/Evaluation  May 24, 2022    Lidia Jenkins is a 76 year old female referred to physical therapy by Willi Neal DO for treatment of Chronic R LBP and R hip pain  with Precautions/Restrictions/MD instructions Lumbar degenerative disc disease and symptomatic hip OA right hip.    Therapist Impression:   Patient has complaints of back and hip pain with transitioning from sit > stand, dressing, sitting > 30 mins, gardening and walking when she suddenly stops. Pain is sharp and resolves upon standing for a couple of seconds. Also reports not having the ROM to mount a bike moving her R leg over.  Patient found to have impaired lumbar and hip ROM, weak hip ABD, poor TA contraction and tenderness to JACOB ITBs. Patient given HEP with butterfly stretch, TA contraction, clam shells and posture correction with lumbar roll.       Subjective:  DOI/onset: 6 months ago  Acute Injury or Gradual Onset?: Gradual injury over time  Mechanism of Injury: unknown  Related PMH: None    Imaging: x-ray: 1.  No acute osseous abnormality.  2.  Mild degenerative changes spine most prominent at L5-S1.  3.  Degenerative changes the right hip with moderate joint space  narrowing, and osseous remodeling and sclerosis of the acetabulum and  femoral head.  Chief Complaint/Functional Limitations:   Low back pain, hip pain and see below in therapy evaluation codes   Pain: rest 0 /10, activity 10/10 Location: JACOB LBP, R hip lateral and groin Frequency: Intermittent Described as: knife, muscle spasm Previous Treatment: Bend over or sit back down, chiro Effect of prior treatment: good Alleviated by: Rest Progression of Symptoms: Unchanged Time of day when pain is worse: Activity related and Position related  Sleeping: No issues/uninterrupted   Occupation: retired  Job duties: cleaning, gardening, reading, walking   Current HEP/exercise regimen: Walking, silver sneakers   Patient's goals are see chief complaints \"To get " "rid of the back pain when I go to stand up\"    Other pertinent PMH/Red Flags: High Blood Pressure, Overweight   Barriers at home/work: Live alone  Pertinent Surgical History: None  Medications: High blood pressure and High cholesterol   General health as reported by patient: good  Return to MD:  5 weeks     Lumbar Spine Evaluation  Static Posture  Standing posture: No obvious findings  Sitting posture: Poor - sacral sitting, crossed ankles, forward head and shoulders     Dynamic Movement Screen  Double limb squat observations: Good technique/no significant findings  Gait: No significant findings    Hip Joint Screen WNL    Trunk Range of Motion  Movement Loss Major Moderate Minimal Nil Pain   Flexion        Extension        Sidebending R   X  X   Sidebending L        Side Gliding R     X   Side Gliding L            Hip ROM Flexion ER  IR   Left WNL 32 35   Right WNL 18 20        Flexibility Left Right   Hamstrings none/WNL none/WNL   Figure 4 mild moderate     Hip and Knee Strength   MMT Left Right   Hip Flexion 5/5 5/5   Hip Extension 5/5 5/5   Hip Abduction 4+/5 4/5   Hip ER 5/5 5/5   Hip IR 5/5 5/5     TA contraction: poor     Palpation:  Mild tenderness to palpation at JACOB ITBs    Assessment/Plan:  Patient is a 76 year old female with lumbar and right side hip complaints.    Patient has the following significant findings with corresponding treatment plan.                Diagnosis 1:  LBP, Hip pain  Pain -  manual therapy and directional preference exercise  Decreased ROM/flexibility - manual therapy and therapeutic exercise  Decreased strength - therapeutic exercise and therapeutic activities  Decreased function - therapeutic activities  Impaired posture - neuro re-education    Therapy Evaluation Codes:   1) History comprised of:   Personal factors that impact the plan of care:      None.    Comorbidity factors that impact the plan of care are:      High Blood Pressure, Overweight .     Medications impacting " care: High blood pressure.  2) Examination of Body Systems comprised of:   Body structures and functions that impact the plan of care:      Hip and Lumbar spine.   Activity limitations that impact the plan of care are:      Sitting, Standing and Walking.  3) Clinical presentation characteristics are:   Stable/Uncomplicated.  4) Decision-Making    Low complexity using standardized patient assessment instrument and/or measureable assessment of functional outcome.  Cumulative Therapy Evaluation is: Low complexity.    Previous and current functional limitations:  (See Goal Flow Sheet for this information)    Short term and Long term goals: (See Goal Flow Sheet for this information)     Communication ability:  Patient appears to be able to clearly communicate and understand verbal and written communication and follow directions correctly.  Treatment Explanation - The following has been discussed with the patient:   RX ordered/plan of care  Anticipated outcomes  Possible risks and side effects  This patient would benefit from PT intervention to resume normal activities.   Rehab potential is good.    Frequency:  1 X week, once daily  Duration:  x4 weeks tapering to 2x/month x 1 month  Discharge Plan:  Achieve all LTG.  Independent in home treatment program.  Reach maximal therapeutic benefit.    Please refer to the daily flowsheet for treatment today, total treatment time and time spent performing 1:1 timed codes.

## 2022-05-31 ENCOUNTER — THERAPY VISIT (OUTPATIENT)
Dept: PHYSICAL THERAPY | Facility: CLINIC | Age: 77
End: 2022-05-31
Payer: COMMERCIAL

## 2022-05-31 DIAGNOSIS — G89.29 CHRONIC RIGHT-SIDED LOW BACK PAIN, UNSPECIFIED WHETHER SCIATICA PRESENT: Primary | ICD-10-CM

## 2022-05-31 DIAGNOSIS — M54.50 CHRONIC RIGHT-SIDED LOW BACK PAIN, UNSPECIFIED WHETHER SCIATICA PRESENT: Primary | ICD-10-CM

## 2022-05-31 DIAGNOSIS — M25.551 CHRONIC RIGHT HIP PAIN: ICD-10-CM

## 2022-05-31 DIAGNOSIS — G89.29 CHRONIC RIGHT HIP PAIN: ICD-10-CM

## 2022-05-31 PROCEDURE — 97140 MANUAL THERAPY 1/> REGIONS: CPT | Mod: GP | Performed by: PHYSICAL THERAPIST

## 2022-05-31 PROCEDURE — 97110 THERAPEUTIC EXERCISES: CPT | Mod: GP | Performed by: PHYSICAL THERAPIST

## 2022-06-06 ENCOUNTER — THERAPY VISIT (OUTPATIENT)
Dept: PHYSICAL THERAPY | Facility: CLINIC | Age: 77
End: 2022-06-06
Payer: COMMERCIAL

## 2022-06-06 DIAGNOSIS — M25.551 CHRONIC RIGHT HIP PAIN: ICD-10-CM

## 2022-06-06 DIAGNOSIS — M54.50 CHRONIC RIGHT-SIDED LOW BACK PAIN, UNSPECIFIED WHETHER SCIATICA PRESENT: Primary | ICD-10-CM

## 2022-06-06 DIAGNOSIS — G89.29 CHRONIC RIGHT-SIDED LOW BACK PAIN, UNSPECIFIED WHETHER SCIATICA PRESENT: Primary | ICD-10-CM

## 2022-06-06 DIAGNOSIS — G89.29 CHRONIC RIGHT HIP PAIN: ICD-10-CM

## 2022-06-06 PROCEDURE — 97140 MANUAL THERAPY 1/> REGIONS: CPT | Mod: GP | Performed by: PHYSICAL THERAPIST

## 2022-06-06 PROCEDURE — 97110 THERAPEUTIC EXERCISES: CPT | Mod: GP | Performed by: PHYSICAL THERAPIST

## 2022-06-22 ENCOUNTER — ANCILLARY PROCEDURE (OUTPATIENT)
Dept: MAMMOGRAPHY | Facility: CLINIC | Age: 77
End: 2022-06-22
Attending: PHYSICIAN ASSISTANT
Payer: COMMERCIAL

## 2022-06-22 DIAGNOSIS — Z12.31 VISIT FOR SCREENING MAMMOGRAM: ICD-10-CM

## 2022-06-22 PROCEDURE — 77067 SCR MAMMO BI INCL CAD: CPT

## 2022-06-22 PROCEDURE — 77067 SCR MAMMO BI INCL CAD: CPT | Mod: 26 | Performed by: RADIOLOGY

## 2022-07-18 DIAGNOSIS — E78.5 HYPERLIPIDEMIA, UNSPECIFIED HYPERLIPIDEMIA TYPE: ICD-10-CM

## 2022-07-18 DIAGNOSIS — I10 HYPERTENSION GOAL BP (BLOOD PRESSURE) < 140/90: ICD-10-CM

## 2022-07-21 RX ORDER — LOVASTATIN 20 MG
20 TABLET ORAL AT BEDTIME
Qty: 90 TABLET | Refills: 0 | Status: SHIPPED | OUTPATIENT
Start: 2022-07-21 | End: 2022-08-23

## 2022-07-21 RX ORDER — ATENOLOL 25 MG/1
25 TABLET ORAL DAILY
Qty: 90 TABLET | Refills: 0 | Status: SHIPPED | OUTPATIENT
Start: 2022-07-21 | End: 2022-08-23

## 2022-07-21 NOTE — TELEPHONE ENCOUNTER
Prescriptionsapproved per [facility] Refill Protocol.    Future Office Visit:   Next 5 appointments (look out 90 days)    Aug 23, 2022 11:00 AM  (Arrive by 10:40 AM)  Annual Wellness Visit with Jessica Cheema PA-C  [facility] ([facility] ) [address] [place], [address]  [facility] [address] [phone]           OLAMIDE LiraN, RN  [facility]

## 2022-07-28 PROBLEM — G89.29 CHRONIC RIGHT HIP PAIN: Status: RESOLVED | Noted: 2022-05-24 | Resolved: 2022-07-28

## 2022-07-28 PROBLEM — M25.551 CHRONIC RIGHT HIP PAIN: Status: RESOLVED | Noted: 2022-05-24 | Resolved: 2022-07-28

## 2022-07-28 PROBLEM — G89.29 CHRONIC RIGHT-SIDED LOW BACK PAIN, UNSPECIFIED WHETHER SCIATICA PRESENT: Status: RESOLVED | Noted: 2022-05-24 | Resolved: 2022-07-28

## 2022-07-28 PROBLEM — M54.50 CHRONIC RIGHT-SIDED LOW BACK PAIN, UNSPECIFIED WHETHER SCIATICA PRESENT: Status: RESOLVED | Noted: 2022-05-24 | Resolved: 2022-07-28

## 2022-07-28 NOTE — PROGRESS NOTES
Discharge Note    Progress reporting period is from initial evaluation date (please see noted date below) to Jun 6, 2022.  Linked Episodes   Type: Episode: Status: Noted: Resolved: Last update: Updated by:   PHYSICAL THERAPY LBP / R hip 5/24/2022 Active 5/24/2022 6/6/2022  8:39 AM Letty Bergeron PT      Comments:       Lidia failed to follow up and current status is unknown.  Please see information below for last relevant information on current status.  Patient seen for 3 visits.    SUBJECTIVE  Subjective changes noted by patient:  Patient states that her pain is getting better in her hip and low back. She would like to spread visits out to see more change between appointments. Still having instances of standing with spasms  .  Current pain level is 4/10.     Previous pain level was  10/10 (at worst).   Changes in function:  Yes (See Goal flowsheet attached for changes in current functional level)  Adverse reaction to treatment or activity: None    OBJECTIVE  Changes noted in objective findings: Lumbar AROM: flexion - nil loss, Extension = min loss     ASSESSMENT/PLAN  Diagnosis: LBP / R hip   Updated problem list and treatment plan:   Pain - HEP  Decreased ROM/flexibility - HEP  Decreased function - HEP  Decreased strength - HEP  STG/LTGs have been met or progress has been made towards goals:  Yes, please see goal flowsheet for most current information  Assessment of Progress: current status is unknown.    Last current status:     Self Management Plans:  HEP  I have re-evaluated this patient and find that the nature, scope, duration and intensity of the therapy is appropriate for the medical condition of the patient.  Lidia continues to require the following intervention to meet STG and LTG's:  HEP.    Recommendations:  Discharge with current home program.  Patient to follow up with MD as needed.    Please refer to the daily flowsheet for treatment today, total treatment time and time spent performing 1:1 timed  codes.

## 2022-08-18 ENCOUNTER — TELEPHONE (OUTPATIENT)
Dept: DERMATOLOGY | Facility: CLINIC | Age: 77
End: 2022-08-18

## 2022-08-22 ASSESSMENT — ACTIVITIES OF DAILY LIVING (ADL): CURRENT_FUNCTION: NO ASSISTANCE NEEDED

## 2022-08-22 ASSESSMENT — ENCOUNTER SYMPTOMS
NERVOUS/ANXIOUS: 0
MYALGIAS: 0
CHILLS: 0
CONSTIPATION: 0
DIARRHEA: 0
FREQUENCY: 0
ARTHRALGIAS: 1
NAUSEA: 0
DYSURIA: 0
DIZZINESS: 0
EYE PAIN: 0
SHORTNESS OF BREATH: 0
PALPITATIONS: 0
HEADACHES: 0
PARESTHESIAS: 1
HEMATOCHEZIA: 0
ABDOMINAL PAIN: 0
HEMATURIA: 0
BREAST MASS: 0
FEVER: 0
JOINT SWELLING: 0
SORE THROAT: 0
HEARTBURN: 0
COUGH: 0
WEAKNESS: 0

## 2022-08-23 ENCOUNTER — OFFICE VISIT (OUTPATIENT)
Dept: FAMILY MEDICINE | Facility: CLINIC | Age: 77
End: 2022-08-23
Payer: COMMERCIAL

## 2022-08-23 VITALS
RESPIRATION RATE: 15 BRPM | WEIGHT: 165 LBS | DIASTOLIC BLOOD PRESSURE: 80 MMHG | OXYGEN SATURATION: 100 % | BODY MASS INDEX: 27.49 KG/M2 | HEIGHT: 65 IN | HEART RATE: 60 BPM | TEMPERATURE: 97.2 F | SYSTOLIC BLOOD PRESSURE: 124 MMHG

## 2022-08-23 DIAGNOSIS — E78.5 HYPERLIPIDEMIA, UNSPECIFIED HYPERLIPIDEMIA TYPE: ICD-10-CM

## 2022-08-23 DIAGNOSIS — Z00.00 ENCOUNTER FOR MEDICARE ANNUAL WELLNESS EXAM: Primary | ICD-10-CM

## 2022-08-23 DIAGNOSIS — I10 HYPERTENSION GOAL BP (BLOOD PRESSURE) < 140/90: ICD-10-CM

## 2022-08-23 PROCEDURE — G0439 PPPS, SUBSEQ VISIT: HCPCS | Performed by: PHYSICIAN ASSISTANT

## 2022-08-23 PROCEDURE — 99214 OFFICE O/P EST MOD 30 MIN: CPT | Mod: 25 | Performed by: PHYSICIAN ASSISTANT

## 2022-08-23 RX ORDER — ATENOLOL 25 MG/1
25 TABLET ORAL DAILY
Qty: 90 TABLET | Refills: 3 | Status: SHIPPED | OUTPATIENT
Start: 2022-08-23 | End: 2023-09-13

## 2022-08-23 RX ORDER — LOVASTATIN 20 MG
20 TABLET ORAL AT BEDTIME
Qty: 90 TABLET | Refills: 3 | Status: SHIPPED | OUTPATIENT
Start: 2022-08-23 | End: 2023-09-13

## 2022-08-23 ASSESSMENT — ENCOUNTER SYMPTOMS
FREQUENCY: 0
PALPITATIONS: 0
DIARRHEA: 0
HEADACHES: 0
HEARTBURN: 0
SHORTNESS OF BREATH: 0
WEAKNESS: 0
BREAST MASS: 0
HEMATURIA: 0
NERVOUS/ANXIOUS: 0
HEMATOCHEZIA: 0
CHILLS: 0
DIZZINESS: 0
ARTHRALGIAS: 1
SORE THROAT: 0
JOINT SWELLING: 0
EYE PAIN: 0
NAUSEA: 0
FEVER: 0
ABDOMINAL PAIN: 0
DYSURIA: 0
MYALGIAS: 0
COUGH: 0
CONSTIPATION: 0
PARESTHESIAS: 1

## 2022-08-23 ASSESSMENT — ACTIVITIES OF DAILY LIVING (ADL): CURRENT_FUNCTION: NO ASSISTANCE NEEDED

## 2022-08-23 NOTE — PATIENT INSTRUCTIONS
Patient Education   Personalized Prevention Plan  You are due for the preventive services outlined below.  Your care team is available to assist you in scheduling these services.  If you have already completed any of these items, please share that information with your care team to update in your medical record.  Health Maintenance Due   Topic Date Due     Annual Wellness Visit  06/15/2022     ANNUAL REVIEW OF HM ORDERS  06/15/2022         Melolabial Transposition Flap Text: The defect edges were debeveled with a #15 scalpel blade.  Given the location of the defect and the proximity to free margins a melolabial flap was deemed most appropriate.  Using a sterile surgical marker, an appropriate melolabial transposition flap was drawn incorporating the defect.    The area thus outlined was incised deep to adipose tissue with a #15 scalpel blade.  The skin margins were undermined to an appropriate distance in all directions utilizing iris scissors.

## 2022-08-23 NOTE — PROGRESS NOTES
"SUBJECTIVE:   Lidia Jenkins is a 77 year old female who presents for Preventive Visit.      Patient has been advised of split billing requirements and indicates understanding: Yes  Are you in the first 12 months of your Medicare coverage?  No    Healthy Habits:     In general, how would you rate your overall health?  Excellent    Frequency of exercise:  4-5 days/week    Duration of exercise:  30-45 minutes    Do you usually eat at least 4 servings of fruit and vegetables a day, include whole grains    & fiber and avoid regularly eating high fat or \"junk\" foods?  Yes    Taking medications regularly:  Yes    Medication side effects:  None    Ability to successfully perform activities of daily living:  No assistance needed    Home Safety:  No safety concerns identified    Hearing Impairment:  Need to ask people to speak up or repeat themselves    In the past 6 months, have you been bothered by leaking of urine?  No    In general, how would you rate your overall mental or emotional health?  Excellent      PHQ-2 Total Score: 0    Additional concerns today:  Yes    Do you feel safe in your environment? Yes    Have you ever done Advance Care Planning? (For example, a Health Directive, POLST, or a discussion with a medical provider or your loved ones about your wishes): Yes, advance care planning is on file.       Fall risk  Fallen 2 or more times in the past year?: No  Any fall with injury in the past year?: No    Cognitive Screening   1) Repeat 3 items (Leader, Season, Table)    2) Clock draw: NORMAL  3) 3 item recall: Recalls 3 objects  Results: 3 items recalled: COGNITIVE IMPAIRMENT LESS LIKELY    Mini-CogTM Copyright MAXIME Spencer. Licensed by the author for use in [facility]; reprinted with permission ([email or web address]@.[facility]). All rights reserved.      Do you have sleep apnea, excessive snoring or daytime drowsiness?: no    Reviewed and updated as needed this visit by clinical staff   Tobacco  Allergies  Meds  " Problems  Med Hx  Surg Hx  Fam Hx            Reviewed and updated as needed this visit by Provider   Tobacco  Allergies  Meds  Problems  Med Hx  Surg Hx  Fam Hx           Social History     Tobacco Use     Smoking status: Never Smoker     Smokeless tobacco: Never Used   Substance Use Topics     Alcohol use: Yes     Alcohol/week: 4.0 standard drinks     Comment: 1-4 glasses of wine a week         Alcohol Use 8/22/2022   Prescreen: >3 drinks/day or >7 drinks/week? No   Prescreen: >3 drinks/day or >7 drinks/week? -           Should she see Cardiologist at her age  Osteo screen?  Backs of hands are sensitive  Plantar fascititis    Current providers sharing in care for this patient include:   Patient Care Team:  Jessica Cheema PA-C as PCP - General (Family Medicine)  Ana Pantoja MD as MD (Family Medicine - Sports Medicine)  Castro Navarro MD as MD (Orthopedics)  Piedad Lloyd MD as MD (Family Practice)  Ana Gaytan PA-C as Physician Assistant (Dermatology)  Piedad Lloyd MD as Assigned PCP  Willi Neal DO as Assigned Musculoskeletal Provider  Conner Kemp MD as MD (Dermatology)  Rachel Aguilar PA-C as Physician Assistant (Dermatology)    The following health maintenance items are reviewed in Epic and correct as of today:  Health Maintenance Due   Topic Date Due     ANNUAL REVIEW OF HM ORDERS  06/15/2022     Lab work is in process  Labs reviewed in EPIC  BP Readings from Last 3 Encounters:   08/23/22 124/80   08/20/21 122/80   08/01/21 131/83    Wt Readings from Last 3 Encounters:   08/23/22 74.8 kg (165 lb)   05/13/22 72.6 kg (160 lb)   08/01/21 72.6 kg (160 lb)                  Patient Active Problem List   Diagnosis     Hypertension goal BP (blood pressure) < 140/90     History of basal cell carcinoma     Advance care planning     Past Surgical History:   Procedure Laterality Date     ABDOMEN SURGERY  1987     COLONOSCOPY  2012      RELEASE TRIGGER FINGER Left 8/15/2019    Procedure: Left Middle Finger Trigger Release;  Surgeon: Castro Navarro MD;  Location:  OR       Social History     Tobacco Use     Smoking status: Never Smoker     Smokeless tobacco: Never Used   Substance Use Topics     Alcohol use: Yes     Alcohol/week: 4.0 standard drinks     Comment: 1-4 glasses of wine a week     Family History   Problem Relation Age of Onset     Breast Cancer Mother      Hypertension Mother      Hyperlipidemia Mother      Depression Mother      Hypertension Brother      Hyperlipidemia Brother      Hypertension Sister      Hyperlipidemia Sister      Coronary Artery Disease Father         MI     Hypertension Father      Hyperlipidemia Father      Substance Abuse Father      Coronary Artery Disease Brother      Hypertension Brother      Substance Abuse Brother      Hyperlipidemia Brother      Hypertension Sister      Hyperlipidemia Sister      Depression Sister      Substance Abuse Sister      Other Cancer No family hx of          Current Outpatient Medications   Medication Sig Dispense Refill     atenolol (TENORMIN) 25 MG tablet Take 1 tablet (25 mg) by mouth daily 90 tablet 3     cholecalciferol (VITAMIN D) 1000 UNIT tablet Take 2 tablets (2,000 Units) by mouth daily 100 tablet 3     lovastatin (MEVACOR) 20 MG tablet Take 1 tablet (20 mg) by mouth At Bedtime 90 tablet 3     No Known Allergies  Recent Labs   Lab Test 06/15/21  0756 08/17/20  0828 06/11/19  0841 06/07/18  0855 07/05/17  0851   * 107* 112*   < > 109*   HDL 67 61 65   < > 66   TRIG 71 71 71   < > 63   ALT 25 23 27   < > 24   CR 0.83 0.76 0.82   < > 0.80   GFRESTIMATED 69 78 72   < > 70   GFRESTBLACK 80 >90 83   < > 85   POTASSIUM 3.8 4.6 4.7   < > 4.0   TSH  --   --  2.93  --  2.40    < > = values in this interval not displayed.        Mammogram Screening - Patient over age 75, has elected to discontinue screenings.  Pertinent mammograms are reviewed under the imaging  "tab.    Review of Systems   Constitutional: Negative for chills and fever.   HENT: Negative for congestion, ear pain, hearing loss and sore throat.    Eyes: Positive for visual disturbance. Negative for pain.   Respiratory: Negative for cough and shortness of breath.    Cardiovascular: Negative for chest pain, palpitations and peripheral edema.   Gastrointestinal: Negative for abdominal pain, constipation, diarrhea, heartburn, hematochezia and nausea.   Breasts:  Negative for tenderness, breast mass and discharge.   Genitourinary: Negative for dysuria, frequency, genital sores, hematuria, pelvic pain, urgency, vaginal bleeding and vaginal discharge.   Musculoskeletal: Positive for arthralgias. Negative for joint swelling and myalgias.   Skin: Negative for rash.   Neurological: Positive for paresthesias. Negative for dizziness, weakness and headaches.   Psychiatric/Behavioral: Negative for mood changes. The patient is not nervous/anxious.        OBJECTIVE:   /80   Pulse 60   Temp 97.2  F (36.2  C) (Temporal)   Resp 15   Ht 1.651 m (5' 5\")   Wt 74.8 kg (165 lb)   SpO2 100%   BMI 27.46 kg/m   Estimated body mass index is 27.46 kg/m  as calculated from the following:    Height as of this encounter: 1.651 m (5' 5\").    Weight as of this encounter: 74.8 kg (165 lb).  Physical Exam  GENERAL: healthy, alert and no distress  EYES: Eyes grossly normal to inspection, PERRL and conjunctivae and sclerae normal  HENT: ear canals and TM's normal, nose and mouth without ulcers or lesions  NECK: no adenopathy, no asymmetry, masses, or scars and thyroid normal to palpation  RESP: lungs clear to auscultation - no rales, rhonchi or wheezes  CV: regular rate and rhythm, normal S1 S2, no S3 or S4, no murmur, click or rub, no peripheral edema and peripheral pulses strong  ABDOMEN: soft, nontender, no hepatosplenomegaly, no masses and bowel sounds normal  MS: no gross musculoskeletal defects noted, no edema  SKIN: no " "suspicious lesions or rashes  NEURO: Normal strength and tone, mentation intact and speech normal  PSYCH: mentation appears normal, affect normal/bright    Diagnostic Test Results:  Labs reviewed in Epic    ASSESSMENT / PLAN:       ICD-10-CM    1. Encounter for Medicare annual wellness exam  Z00.00    2. Hypertension goal BP (blood pressure) < 140/90  I10 atenolol (TENORMIN) 25 MG tablet     Hemoglobin     Comprehensive metabolic panel   3. Hyperlipidemia, unspecified hyperlipidemia type  E78.5 lovastatin (MEVACOR) 20 MG tablet     Lipid panel reflex to direct LDL Fasting       Patient has been advised of split billing requirements and indicates understanding: Yes    COUNSELING:  Reviewed preventive health counseling, as reflected in patient instructions       Regular exercise       Healthy diet/nutrition       Vision screening       Hearing screening    Estimated body mass index is 27.46 kg/m  as calculated from the following:    Height as of this encounter: 1.651 m (5' 5\").    Weight as of this encounter: 74.8 kg (165 lb).    Weight management plan: Discussed healthy diet and exercise guidelines    She reports that she has never smoked. She has never used smokeless tobacco.      Appropriate preventive services were discussed with this patient, including applicable screening as appropriate for cardiovascular disease, diabetes, osteopenia/osteoporosis, and glaucoma.  As appropriate for age/gender, discussed screening for colorectal cancer, prostate cancer, breast cancer, and cervical cancer. Checklist reviewing preventive services available has been given to the patient.    Reviewed patients plan of care and provided an AVS. The Basic Care Plan (routine screening as documented in Health Maintenance) for Lidia meets the Care Plan requirement. This Care Plan has been established and reviewed with the Patient.    Counseling Resources:  ATP IV Guidelines  Pooled Cohorts Equation Calculator  Breast Cancer Risk " Calculator  Breast Cancer: Medication to Reduce Risk  FRAX Risk Assessment  ICSI Preventive Guidelines  Dietary Guidelines for Americans, 2010  [employer]'s MyPlate  ASA Prophylaxis  Lung CA Screening    JUAN ANTONIO [facility]    Identified Health Risks:

## 2022-08-24 ENCOUNTER — OFFICE VISIT (OUTPATIENT)
Dept: DERMATOLOGY | Facility: CLINIC | Age: 77
End: 2022-08-24
Payer: COMMERCIAL

## 2022-08-24 DIAGNOSIS — Z12.83 SKIN CANCER SCREENING: Primary | ICD-10-CM

## 2022-08-24 DIAGNOSIS — L81.4 SOLAR LENTIGINOSIS: ICD-10-CM

## 2022-08-24 DIAGNOSIS — L84 CLAVUS: ICD-10-CM

## 2022-08-24 DIAGNOSIS — L82.1 SEBORRHEIC KERATOSES: ICD-10-CM

## 2022-08-24 DIAGNOSIS — Z85.828 HISTORY OF BASAL CELL CARCINOMA: ICD-10-CM

## 2022-08-24 DIAGNOSIS — D22.9 MULTIPLE PIGMENTED NEVI: ICD-10-CM

## 2022-08-24 DIAGNOSIS — D18.01 CHERRY ANGIOMA: ICD-10-CM

## 2022-08-24 PROCEDURE — 99213 OFFICE O/P EST LOW 20 MIN: CPT | Performed by: PHYSICIAN ASSISTANT

## 2022-08-24 ASSESSMENT — PAIN SCALES - GENERAL: PAINLEVEL: NO PAIN (0)

## 2022-08-24 NOTE — NURSING NOTE
Dermatology Rooming Note    ToshiaFLAKITO Jenkins's goals for this visit include:   Chief Complaint   Patient presents with     Skin Check     Lidia is here for an annual skin check.  Has a spot on her back and a possible wart on left foot.     Natalya Iglesias, CHIARA

## 2022-08-24 NOTE — PROGRESS NOTES
[facility] Dermatology Note  Encounter Date: Aug 24, 2022  Office Visit     Dermatology Problem List:  1. Psoriasis  2. History of NMSC,  - BCC, back and right patella  ____________________________________________    Assessment & Plan:    #Skin cancer screening with multiple benign findings including solar lentigines, multiple pigmented nevi, cherry angiomas and solar lentiginosis   - ABCDEs: Counseled ABCDEs of melanoma: Asymmetry, Border (irregularity), Color (not uniform, changes in color), Diameter (greater than 6 mm which is about the size of a pencil eraser), and Evolving (any changes in preexisting moles).  - Sun protection: Counseled SPF30+ sunscreen, UPF clothing, sun avoidance, tanning bed avoidance.     # History of nonmelanoma skin cancer, no clincial evidence of recurrence.   -Recommend annual screenings  -We discussed sun protection.    #Corns, clavus of bilateral feet, greater on the left lateral foot.  -Discussed paring these down and using a pumice stone to keep the medicine as possible.  -Patient to see podiatry for her plantar fasciitis, they may have further recommendations.  We will      Procedures Performed:   None      Follow-up: 1 year(s) in-person, or earlier for new or changing lesions    Staff and Scribe:     Scribe Disclosure:  Jean LANCASTER, am serving as a scribe to prepare the note used to document services personally performed by Rachel Aguilar PA-C.     Provider Disclosure:   The documentation recorded by the scribe accurately reflects the services I personally performed and the decisions made by me.    All risks, benefits and alternatives were discussed with patient.  Patient is in agreement and understands the assessment and plan.  All questions were answered.  Sun Screen Education was given.   Return to Clinic annually or sooner as needed.   Rachel Aguilar PA-C   [facility]    ____________________________________________    CC: Skin Check (Lidia is here for an annual skin check.  Has a spot on her back and a possible wart on left foot.)    HPI:  Ms. Lidia Jenkins is a(n) 77 year old female who presents today as a return patient for a skin check.. Last seen in dermatology by Brittany Gaytan PA-C on 8/20/2021, at which time patient received an unremarkable FBSE.    Today, she reports she has small little seeds on both of her feet, most notably on the left foot.  She states when she walks with bare feet it hurts.  She does have plantar fasciitis but does not believe this is related to her discomfort.    She has a growth on her back and she wonders if this is anything to be concerned about.  It is not changing, growing, bleeding or painful.    She tries to be good about sun protection but often is outdoors and forgets to reapply sunscreen.    Patient is otherwise feeling well, without additional skin concerns.    Labs Reviewed:  N/A    Physical Exam:  Vitals: There were no vitals taken for this visit.  SKIN: Full skin, which includes the head/face, both arms, chest, back, abdomen,both legs, genitalia and/or groin buttocks, digits and/or nails, was examined.  -Suntanned skin.  - There are dome shaped bright red papules on the trunk..   Multiple regular brown pigmented macules and papules are identified on the face, trunk and extremities.   There is no erythema, telangectasias, nodularity, or pigmentation on the back and right patella.  Scattered brown macules on sun exposed areas, diffusely.  There are waxy stuck on tan to brown papules on the face, trunk and extremities including the right upper back.  -There are hyperkeratotic fine papules on the lateral surfaces of the feet, thicker on the left foot.  - No other lesions of concern on areas examined.     Medications:  Current Outpatient Medications   Medication     atenolol (TENORMIN) 25 MG tablet     cholecalciferol (VITAMIN D) 1000 UNIT  tablet     lovastatin (MEVACOR) 20 MG tablet     No current facility-administered medications for this visit.      Past Medical History:   Patient Active Problem List   Diagnosis     Hypertension goal BP (blood pressure) < 140/90     History of basal cell carcinoma     Advance care planning     Past Medical History:   Diagnosis Date     Arthritis      Basal cell carcinoma      Hyperlipidaemia LDL goal < 100      Hypertension goal BP (blood pressure) < 140/90      Psoriasis         CC Referred Self, MD  No address on file on close of this encounter.

## 2022-08-24 NOTE — LETTER
8/24/2022       RE: Lidia Jenkins  [address] [facility] [address]     Dear Colleague,    Thank you for referring your patient, Lidia Jenkins, to the [facility] DERMATOLOGY [facility] [place] at [facility]. Please see a copy of my visit note below.    [facility] Dermatology Note  Encounter Date: Aug 24, 2022  Office Visit     Dermatology Problem List:  1. Psoriasis  2. History of NMSC,  - BCC, back and right patella  ____________________________________________    Assessment & Plan:    #Skin cancer screening with multiple benign findings including solar lentigines, multiple pigmented nevi, cherry angiomas and solar lentiginosis   - ABCDEs: Counseled ABCDEs of melanoma: Asymmetry, Border (irregularity), Color (not uniform, changes in color), Diameter (greater than 6 mm which is about the size of a pencil eraser), and Evolving (any changes in preexisting moles).  - Sun protection: Counseled SPF30+ sunscreen, UPF clothing, sun avoidance, tanning bed avoidance.     # History of nonmelanoma skin cancer, no clincial evidence of recurrence.   -Recommend annual screenings  -We discussed sun protection.    #Corns, clavus of bilateral feet, greater on the left lateral foot.  -Discussed paring these down and using a pumice stone to keep the medicine as possible.  -Patient to see podiatry for her plantar fasciitis, they may have further recommendations.  We will      Procedures Performed:   None      Follow-up: 1 year(s) in-person, or earlier for new or changing lesions    Staff and Scribe:     Scribe Disclosure:  I, Jean Rush, am serving as a scribe to prepare the note used to document services personally performed by Rachel Aguilar PA-C.     Provider Disclosure:   The documentation recorded by the scribe accurately reflects the services I personally performed and the decisions made by me.    All risks, benefits and alternatives  were discussed with patient.  Patient is in agreement and understands the assessment and plan.  All questions were answered.  Sun Screen Education was given.   Return to Clinic annually or sooner as needed.   Rachel Aguilar PA-C   [facility]   ____________________________________________    CC: Skin Check (Lidia is here for an annual skin check.  Has a spot on her back and a possible wart on left foot.)    HPI:  Ms. Lidia Jenkins is a(n) 77 year old female who presents today as a return patient for a skin check.. Last seen in dermatology by Brittany Gaytan PA-C on 8/20/2021, at which time patient received an unremarkable FBSE.    Today, she reports she has small little seeds on both of her feet, most notably on the left foot.  She states when she walks with bare feet it hurts.  She does have plantar fasciitis but does not believe this is related to her discomfort.    She has a growth on her back and she wonders if this is anything to be concerned about.  It is not changing, growing, bleeding or painful.    She tries to be good about sun protection but often is outdoors and forgets to reapply sunscreen.    Patient is otherwise feeling well, without additional skin concerns.    Labs Reviewed:  N/A    Physical Exam:  Vitals: There were no vitals taken for this visit.  SKIN: Full skin, which includes the head/face, both arms, chest, back, abdomen,both legs, genitalia and/or groin buttocks, digits and/or nails, was examined.  -Suntanned skin.  - There are dome shaped bright red papules on the trunk..   Multiple regular brown pigmented macules and papules are identified on the face, trunk and extremities.   There is no erythema, telangectasias, nodularity, or pigmentation on the back and right patella.  Scattered brown macules on sun exposed areas, diffusely.  There are waxy stuck on tan to brown papules on the face, trunk and extremities including the right upper back.  -There are  hyperkeratotic fine papules on the lateral surfaces of the feet, thicker on the left foot.  - No other lesions of concern on areas examined.     Medications:  Current Outpatient Medications   Medication     atenolol (TENORMIN) 25 MG tablet     cholecalciferol (VITAMIN D) 1000 UNIT tablet     lovastatin (MEVACOR) 20 MG tablet     No current facility-administered medications for this visit.      Past Medical History:   Patient Active Problem List   Diagnosis     Hypertension goal BP (blood pressure) < 140/90     History of basal cell carcinoma     Advance care planning     Past Medical History:   Diagnosis Date     Arthritis      Basal cell carcinoma      Hyperlipidaemia LDL goal < 100      Hypertension goal BP (blood pressure) < 140/90      Psoriasis         CC Referred Self, MD  No address on file on close of this encounter.

## 2022-08-27 ENCOUNTER — LAB (OUTPATIENT)
Dept: LAB | Facility: CLINIC | Age: 77
End: 2022-08-27
Payer: COMMERCIAL

## 2022-08-27 DIAGNOSIS — E78.5 HYPERLIPIDEMIA, UNSPECIFIED HYPERLIPIDEMIA TYPE: ICD-10-CM

## 2022-08-27 DIAGNOSIS — I10 HYPERTENSION GOAL BP (BLOOD PRESSURE) < 140/90: ICD-10-CM

## 2022-08-27 LAB
ALBUMIN SERPL-MCNC: 3.7 G/DL (ref 3.4–5)
ALP SERPL-CCNC: 46 U/L (ref 40–150)
ALT SERPL W P-5'-P-CCNC: 26 U/L (ref 0–50)
ANION GAP SERPL CALCULATED.3IONS-SCNC: 5 MMOL/L (ref 3–14)
AST SERPL W P-5'-P-CCNC: 24 U/L (ref 0–45)
BILIRUB SERPL-MCNC: 0.5 MG/DL (ref 0.2–1.3)
BUN SERPL-MCNC: 19 MG/DL (ref 7–30)
CALCIUM SERPL-MCNC: 9.2 MG/DL (ref 8.5–10.1)
CHLORIDE BLD-SCNC: 110 MMOL/L (ref 94–109)
CHOLEST SERPL-MCNC: 179 MG/DL
CO2 SERPL-SCNC: 27 MMOL/L (ref 20–32)
CREAT SERPL-MCNC: 0.75 MG/DL (ref 0.52–1.04)
FASTING STATUS PATIENT QL REPORTED: YES
GFR SERPL CREATININE-BSD FRML MDRD: 82 ML/MIN/1.73M2
GLUCOSE BLD-MCNC: 98 MG/DL (ref 70–99)
HDLC SERPL-MCNC: 70 MG/DL
HGB BLD-MCNC: 13.7 G/DL (ref 11.7–15.7)
LDLC SERPL CALC-MCNC: 93 MG/DL
NONHDLC SERPL-MCNC: 109 MG/DL
POTASSIUM BLD-SCNC: 4.3 MMOL/L (ref 3.4–5.3)
PROT SERPL-MCNC: 7.1 G/DL (ref 6.8–8.8)
SODIUM SERPL-SCNC: 142 MMOL/L (ref 133–144)
TRIGL SERPL-MCNC: 81 MG/DL

## 2022-08-27 PROCEDURE — 80061 LIPID PANEL: CPT

## 2022-08-27 PROCEDURE — 36415 COLL VENOUS BLD VENIPUNCTURE: CPT

## 2022-08-27 PROCEDURE — 80053 COMPREHEN METABOLIC PANEL: CPT

## 2022-08-27 PROCEDURE — 85018 HEMOGLOBIN: CPT

## 2022-09-11 ENCOUNTER — HEALTH MAINTENANCE LETTER (OUTPATIENT)
Age: 77
End: 2022-09-11

## 2022-09-27 ENCOUNTER — MYC MEDICAL ADVICE (OUTPATIENT)
Dept: FAMILY MEDICINE | Facility: CLINIC | Age: 77
End: 2022-09-27

## 2022-09-27 DIAGNOSIS — A60.1 HERPES SIMPLEX INFECTION OF PERIANAL SKIN: ICD-10-CM

## 2022-09-27 RX ORDER — ACYCLOVIR 400 MG/1
400 TABLET ORAL 3 TIMES DAILY
Qty: 30 TABLET | Refills: 0 | Status: SHIPPED | OUTPATIENT
Start: 2022-09-27 | End: 2024-07-25

## 2022-09-27 NOTE — TELEPHONE ENCOUNTER
MP,    Routing refill request to provider for review/approval because:  Drug not active on patient's medication list  Order T'd up from medication history list.  Last Rx 2018.    Last seen 8/23/22 - physical.    Thank you,  Manju Yan RN

## 2022-10-18 DIAGNOSIS — E78.5 HYPERLIPIDEMIA, UNSPECIFIED HYPERLIPIDEMIA TYPE: ICD-10-CM

## 2022-10-18 DIAGNOSIS — I10 HYPERTENSION GOAL BP (BLOOD PRESSURE) < 140/90: ICD-10-CM

## 2022-10-20 RX ORDER — ATENOLOL 25 MG/1
TABLET ORAL
Qty: 90 TABLET | Refills: 3 | OUTPATIENT
Start: 2022-10-20

## 2022-10-20 RX ORDER — LOVASTATIN 20 MG
TABLET ORAL
Qty: 90 TABLET | Refills: 3 | OUTPATIENT
Start: 2022-10-20

## 2022-10-20 RX ORDER — ATENOLOL 25 MG/1
TABLET ORAL
Qty: 1 TABLET | Refills: 0 | OUTPATIENT
Start: 2022-10-20

## 2022-10-20 RX ORDER — LOVASTATIN 20 MG
TABLET ORAL
Qty: 1 TABLET | Refills: 0 | OUTPATIENT
Start: 2022-10-20

## 2023-06-23 ENCOUNTER — ANCILLARY PROCEDURE (OUTPATIENT)
Dept: MAMMOGRAPHY | Facility: CLINIC | Age: 78
End: 2023-06-23
Attending: PHYSICIAN ASSISTANT
Payer: COMMERCIAL

## 2023-06-23 DIAGNOSIS — Z12.31 VISIT FOR SCREENING MAMMOGRAM: ICD-10-CM

## 2023-06-23 PROCEDURE — 77067 SCR MAMMO BI INCL CAD: CPT | Mod: GC | Performed by: RADIOLOGY

## 2023-06-23 PROCEDURE — 77063 BREAST TOMOSYNTHESIS BI: CPT | Mod: GC | Performed by: RADIOLOGY

## 2023-07-22 ENCOUNTER — OFFICE VISIT (OUTPATIENT)
Dept: OPHTHALMOLOGY | Facility: CLINIC | Age: 78
End: 2023-07-22
Payer: COMMERCIAL

## 2023-07-22 DIAGNOSIS — H25.813 COMBINED FORMS OF AGE-RELATED CATARACT OF BOTH EYES: ICD-10-CM

## 2023-07-22 DIAGNOSIS — H52.03 HYPEROPIA OF BOTH EYES WITH REGULAR ASTIGMATISM AND PRESBYOPIA: ICD-10-CM

## 2023-07-22 DIAGNOSIS — H04.123 BILATERAL DRY EYES: Primary | ICD-10-CM

## 2023-07-22 DIAGNOSIS — H52.223 HYPEROPIA OF BOTH EYES WITH REGULAR ASTIGMATISM AND PRESBYOPIA: ICD-10-CM

## 2023-07-22 DIAGNOSIS — H52.4 HYPEROPIA OF BOTH EYES WITH REGULAR ASTIGMATISM AND PRESBYOPIA: ICD-10-CM

## 2023-07-22 PROCEDURE — 92004 COMPRE OPH EXAM NEW PT 1/>: CPT | Performed by: OPTOMETRIST

## 2023-07-22 ASSESSMENT — CONF VISUAL FIELD
OS_NORMAL: 1
OD_INFERIOR_TEMPORAL_RESTRICTION: 0
OS_SUPERIOR_NASAL_RESTRICTION: 0
OS_SUPERIOR_TEMPORAL_RESTRICTION: 0
OS_INFERIOR_TEMPORAL_RESTRICTION: 0
OD_INFERIOR_NASAL_RESTRICTION: 0
OS_INFERIOR_NASAL_RESTRICTION: 0
OD_NORMAL: 1
OD_SUPERIOR_TEMPORAL_RESTRICTION: 0
OD_SUPERIOR_NASAL_RESTRICTION: 0
METHOD: COUNTING FINGERS

## 2023-07-22 ASSESSMENT — REFRACTION_WEARINGRX
OS_ADD: +2.50
SPECS_TYPE: PAL
OS_CYLINDER: +0.75
OD_AXIS: 098
OD_ADD: +2.50
OS_SPHERE: +0.75
OS_AXIS: 105
OD_CYLINDER: +0.50
OD_SPHERE: +1.75

## 2023-07-22 ASSESSMENT — VISUAL ACUITY
OS_CC: 20/20
OD_CC+: -1
METHOD: SNELLEN - LINEAR
CORRECTION_TYPE: GLASSES
OD_CC: 20/25

## 2023-07-22 ASSESSMENT — CUP TO DISC RATIO
OD_RATIO: 0.25
OS_RATIO: 0.25

## 2023-07-22 ASSESSMENT — REFRACTION_MANIFEST
OS_AXIS: 110
OS_SPHERE: +0.75
OD_ADD: +2.50
OD_SPHERE: +1.75
OS_ADD: +2.50
OS_CYLINDER: +1.00
OD_CYLINDER: +0.50
OD_AXIS: 095

## 2023-07-22 ASSESSMENT — EXTERNAL EXAM - RIGHT EYE: OD_EXAM: NORMAL

## 2023-07-22 ASSESSMENT — SLIT LAMP EXAM - LIDS
COMMENTS: NORMAL
COMMENTS: NORMAL

## 2023-07-22 ASSESSMENT — EXTERNAL EXAM - LEFT EYE: OS_EXAM: NORMAL

## 2023-07-22 ASSESSMENT — TONOMETRY
OD_IOP_MMHG: 14
IOP_METHOD: ICARE
OS_IOP_MMHG: 15

## 2023-07-22 NOTE — NURSING NOTE
Chief Complaints and History of Present Illnesses   Patient presents with     COMPREHENSIVE EYE EXAM     Chief Complaint(s) and History of Present Illness(es)     COMPREHENSIVE EYE EXAM            Laterality: both eyes    Quality: hazy (Left eye)    Context: distance vision    Associated symptoms: dryness.  Negative for eye pain, flashes and floaters    Treatments tried: artificial tears          Comments    Lidia Jenkins is a 77 year old female who presents for a comprehensive exam. Last eye exam was over 1 year ago. Since exam, vision has gradually decreased making it more difficult to read road signs and identify birds on a power line. Uses AT as needed for dryness. No flashes or floaters. No eye pain.     Jean Loyola COT 7:24 AM July 22, 2023

## 2023-07-22 NOTE — PROGRESS NOTES
Assessment & Plan       Lidia Jenkins is a 77 year old female with the following diagnoses:   1. Bilateral dry eyes - Both Eyes    2. Combined forms of age-related cataract of both eyes - Both Eyes    3. Hyperopia of both eyes with regular astigmatism and presbyopia - Both Eyes          dry eyes cont lubricants each eye   Cataracts surgical consult   NO RX    Patient disposition:   No follow-ups on file.          Complete documentation of historical and exam elements from today's encounter can be found in the full encounter summary report (not reduplicated in this progress note). I personally obtained the chief complaint(s) and history of present illness.  I confirmed and edited as necessary the review of systems, past medical/surgical history, family history, social history, and examination findings as documented by others; and I examined the patient myself. I personally reviewed the relevant tests, images, and reports as documented above. I formulated and edited as necessary the assessment and plan and discussed the findings and management plan with the patient and family.  Dr. Mal Leal

## 2023-07-25 ENCOUNTER — PATIENT OUTREACH (OUTPATIENT)
Dept: CARE COORDINATION | Facility: CLINIC | Age: 78
End: 2023-07-25
Payer: COMMERCIAL

## 2023-08-02 NOTE — TELEPHONE ENCOUNTER
FUTURE VISIT INFORMATION      FUTURE VISIT INFORMATION:  Date: 9/26/23  Time: 10:40am  Location: [facility]  REFERRAL INFORMATION:  Referring provider:  Mal Leal OD   Referring providers clinic:  [facility]  Reason for visit/diagnosis  CE    RECORDS REQUESTED FROM:       Clinic name Comments Records Status Imaging Status   [facility] OV/referral 7/22/23 EPIC

## 2023-08-08 ENCOUNTER — PATIENT OUTREACH (OUTPATIENT)
Dept: CARE COORDINATION | Facility: CLINIC | Age: 78
End: 2023-08-08
Payer: COMMERCIAL

## 2023-08-15 DIAGNOSIS — H91.90 CHANGE IN HEARING, UNSPECIFIED LATERALITY: Primary | ICD-10-CM

## 2023-09-10 ASSESSMENT — ENCOUNTER SYMPTOMS
JOINT SWELLING: 0
EYE PAIN: 0
EYE REDNESS: 0
STIFFNESS: 0
ARTHRALGIAS: 1
EYE WATERING: 1
MUSCLE WEAKNESS: 0
MUSCLE CRAMPS: 1
DOUBLE VISION: 0
NECK PAIN: 0
BACK PAIN: 1
EYE IRRITATION: 0
MYALGIAS: 0

## 2023-09-12 NOTE — PROGRESS NOTES
SUBJECTIVE:   Lidia is a 78 year old who presents for Preventive Visit.    Are you in the first 12 months of your Medicare coverage?  No    HPI    Lidia is having bilateral dorsal hand pain (ache) in cold weather for the past 5-6 years.  No fingers symptoms or color changes.  Wears gloves and that helps.      She has been having splitting fingernails over the winter since for about 5 years.  No rashes.      She has also been having leg cramps for the past 6 months to a year.  Wakes at night with cramps, usually on the R side but has happened on both.  It is sporadic.      Itching ears for about 5 years and getting worse.  She is applying olive oil with a Q-tip and that does help.      She has TMJ issues with pain with chewing.  She talked to her dentist.  She is trying some online exercises.      Heart disease in family and stress testing.  She has no chest pain or MEJÍA.        Blood pressure is elevated at 156/82.  BP last checked a year ago in clinic and was good.  She has not checked at home recently.        The 10-year ASCVD risk score (Pauline LEMOS, et al., 2019) is: 35.1%    Values used to calculate the score:      Age: 78 years      Sex: Female      Is Non- : No      Diabetic: No      Tobacco smoker: No      Systolic Blood Pressure: 152 mmHg      Is BP treated: Yes      HDL Cholesterol: 74 mg/dL      Total Cholesterol: 195 mg/dL     Have you ever done Advance Care Planning? (For example, a Health Directive, POLST, or a discussion with a medical provider or your loved ones about your wishes): Yes, advance care planning is on file.  Info is UTD       Fall risk  Fallen 2 or more times in the past year?: No  Any fall with injury in the past year?: No    Cognitive Screening - forgot to do formal screen but memory and recall are intact during our visit, no concerns      Reviewed and updated as needed this visit by clinical staff   Tobacco  Allergies  Meds   Med Hx  Surg Hx  Fam Hx  Soc Hx         Reviewed and updated as needed this visit by Provider   Tobacco     Med Hx  Surg Hx  Fam Hx  Soc Hx       Social History     Tobacco Use    Smoking status: Never    Smokeless tobacco: Never   Substance Use Topics    Alcohol use: Yes     Alcohol/week: 4.0 standard drinks of alcohol     Comment: 1-4 glasses of wine a week             8/22/2022     3:36 PM   Alcohol Use   Prescreen: >3 drinks/day or >7 drinks/week? No     Do you have a current opioid prescription? No  Do you use any other controlled substances or medications that are not prescribed by a provider? None          Current providers sharing in care for this patient include:   Patient Care Team:  Jessica Cheema PA-C as PCP - General (Family Medicine)  Ana Pantoja MD as MD (Family Medicine - Sports Medicine)  Castro Navarro MD as MD (Orthopedics)  Piedad Lloyd MD as MD (Family Practice)  Ana Gaytan PA-C as Physician Assistant (Dermatology)  Willi Neal DO as Assigned Musculoskeletal Provider  Conner Kemp MD as MD (Dermatology)  Rachel Aguilar PA-C as Physician Assistant (Dermatology)  Jessica Cheema PA-C as Assigned PCP  Bettina Grady MD as MD (Dermatology)  Mal Leal OD as Assigned Surgical Provider  Whitney Edouard MD as MD (Ophthalmology)    The following health maintenance items are reviewed in Epic and correct as of today:  Health Maintenance   Topic Date Due    DTAP/TDAP/TD IMMUNIZATION (3 - Td or Tdap) 01/01/2023    INFLUENZA VACCINE (1) 09/01/2023    MEDICARE ANNUAL WELLNESS VISIT  08/23/2023    LIPID  09/13/2024    CREATININE  09/13/2024    FALL RISK ASSESSMENT  09/13/2024    ADVANCE CARE PLANNING  08/23/2027    DEXA  09/10/2030    HEPATITIS C SCREENING  Completed    PHQ-2 (once per calendar year)  Completed    Pneumococcal Vaccine: 65+ Years  Completed    ZOSTER IMMUNIZATION  Completed    COVID-19 Vaccine  Completed    IPV  "IMMUNIZATION  Aged Out    HPV IMMUNIZATION  Aged Out    MENINGITIS IMMUNIZATION  Aged Out    MAMMO SCREENING  Discontinued    COLORECTAL CANCER SCREENING  Discontinued       Pertinent mammograms are reviewed under the imaging tab.    Review of Systems    Answers submitted by the patient for this visit:  Symptoms you have experienced in the last 30 days (Submitted on 9/10/2023)  General Symptoms: No  Skin Symptoms: No  HENT Symptoms: No  EYE SYMPTOMS: Yes  HEART SYMPTOMS: No  LUNG SYMPTOMS: No  INTESTINAL SYMPTOMS: No  URINARY SYMPTOMS: No  GYNECOLOGIC SYMPTOMS: No  BREAST SYMPTOMS: No  SKELETAL SYMPTOMS: Yes  BLOOD SYMPTOMS: No  NERVOUS SYSTEM SYMPTOMS: No  MENTAL HEALTH SYMPTOMS: No  Please answer the questions below to tell us what conditions you are experiencing: (Submitted on 9/10/2023)  Vision loss: No  Dry eyes: No  Watery eyes: Yes  Eye bulging: No  Double vision: No  Flashing of lights: No  Spots: No  Floaters: No  Crossed eyes: No  Tunnel Vision: No  Yellowing of eyes: No  Eye irritation: No  Please answer the questions below to tell us what condition you are experiencing: (Submitted on 9/10/2023)  Bone pain: No  Muscle cramps: Yes  Muscle weakness: No  Joint stiffness: No  Bone fracture: No      OBJECTIVE:   BP (!) 152/74   Pulse 56   Ht 1.651 m (5' 5\")   Wt 77.6 kg (171 lb 1.6 oz)   SpO2 98%   BMI 28.47 kg/m   Estimated body mass index is 28.47 kg/m  as calculated from the following:    Height as of this encounter: 1.651 m (5' 5\").    Weight as of this encounter: 77.6 kg (171 lb 1.6 oz).  Physical Exam  GENERAL: healthy, alert and no distress  EYES: Eyes grossly normal to inspection, PERRL and conjunctivae and sclerae normal  HENT: ear canals and TM's normal, nose and mouth without ulcers or lesions  NECK: no adenopathy, no asymmetry, masses, or scars and thyroid normal to palpation  RESP: lungs clear to auscultation - no rales, rhonchi or wheezes  CV: regular rate and rhythm, normal S1 S2, no S3 or " S4, no murmur, click or rub, no peripheral edema   ABDOMEN: soft, nontender, no hepatosplenomegaly, no masses and bowel sounds normal  MS: no gross musculoskeletal defects noted, no edema  SKIN: no suspicious lesions or rashes  NEURO: Normal strength and tone, mentation intact and speech normal  PSYCH: mentation appears normal, affect normal/bright        ASSESSMENT / PLAN:   (Z00.00) Encounter for Medicare annual wellness exam  (primary encounter diagnosis)    Immunizations: Discussed getting tetanus booster at pharmacy.  Offered flu vaccine today, but she prefers to wait and get these at the pharmacy when she gets the RSV vax and COVID booster.    Lab Studies: as below  Advanced care planning: on file and UTD     (I10) Hypertension goal BP (blood pressure) < 140/90  Comment: BP high today- she will check some home BPs and send a My[facility] message in a couple of weeks with numbers.  If high, we discussed medication options.  Her HR is already 56, so would not increase atenolol.  Discussed adding diuretic or ACE/ARB and she did not have a strong preference.   Plan: atenolol (TENORMIN) 25 MG tablet, Comprehensive        metabolic panel (BMP + Alb, Alk Phos, ALT, AST,        Total. Bili, TP)            (E78.5) Hyperlipidemia, unspecified hyperlipidemia type  Comment: Lidia wonders if she should have a stress test due to her family history, but she is asymptomatic.  Discussed that stress testing is indicated when someone has symptoms.  Discussed CAC scoring for asymptomatic risk stratification, but her ASCVD is already known to be in the high risk category, so this would not .  Discussed minimizing risk through good BP and cholesterol control.  Her LDL is just a bit high at 105- could increase to a higher potency statin.  MyC result note sent to discuss this.  Would not recommend aspirin in her age group due to higher bleeding risk.   Plan: Lipid panel reflex to direct LDL Non-fasting,         lovastatin  (MEVACOR) 20 MG tablet            (L60.3) Nail dystrophy  Comment: She gets cracking of the nails in cold weather, no other associated skin problems.  Checking labs as below.   Plan: CBC with platelets, TSH with free T4 reflex,         Iron and iron binding capacity, Comprehensive         metabolic panel (BMP + Alb, Alk Phos, ALT, AST,        Total. Bili, TP), Folate, Ferritin            (M79.641, M79.642) Bilateral hand pain  Comment:  Lidia experiences dorsal hand pain only in the cold, which is mitigated by wearing gloves.  No symptoms in the fingers or color change to suggest Raynaud phenomenon.  May be arthritis related.  Discussed x-rays, but she defers.     (R25.2) Muscle cramp  Comment: Lidia is having nocturnal leg cramps.  We checked labs below significant for elevated BUN with normal creatinine and slightly high Mg.  She reports she probably doesn't stay well hydrated, so discussed increasing water intake and doing stretches before bed.   Plan: TSH with free T4 reflex, Comprehensive         metabolic panel (BMP + Alb, Alk Phos, ALT, AST,        Total. Bili, TP), Magnesium            (L29.9) Ear itch  Comment: May be related to ear canal dermatitis.  She has been using olive oil with improvement and canals look good today.  Can continue prn olive oil.  If symptoms worsen, could consider steroid ear drops.     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  We discussed lifestyle management with avoiding chewy foods, continue the exercises she found, and I'd recommend talking with the dentist specifically about a mouthguard for night.  Discussed NSAIDs and muscle relaxers. She defers a prescription for the latter as she prefers to avoid meds.           COUNSELING:  Reviewed preventive health counseling, as reflected in patient instructions        She reports that she has never smoked. She has never used  smokeless tobacco.      Appropriate preventive services were discussed with this patient, including applicable screening as appropriate for cardiovascular disease, diabetes, osteopenia/osteoporosis, and glaucoma.  As appropriate for age/gender, discussed screening for colorectal cancer, prostate cancer, breast cancer, and cervical cancer. Checklist reviewing preventive services available has been given to the patient.    Reviewed patients plan of care and provided an AVS. The Basic Care Plan (routine screening as documented in Health Maintenance) for Lidia meets the Care Plan requirement. This Care Plan has been established and reviewed with the Patient.    Alejandro Serrano MD  [facility] INTERNAL MEDICINE [place]    Identified Health Risks:  She endorses reduced hearing but has eval already scheduled

## 2023-09-12 NOTE — PATIENT INSTRUCTIONS
Check blood pressure at home at least a few times per week for the next 2 weeks and send a [employer] message with the numbers.      Get tetanus booster at the pharmacy.  You can also get flu, COVID and RSV vaccines there when available.          Patient Education   Personalized Prevention Plan  You are due for the preventive services outlined below.  Your care team is available to assist you in scheduling these services.  If you have already completed any of these items, please share that information with your care team to update in your medical record.  Health Maintenance Due   Topic Date Due    PHQ-2 (once per calendar year)  01/01/2023    Diptheria Tetanus Pertussis (DTAP/TDAP/TD) Vaccine (3 - Td or Tdap) 01/01/2023    FALL RISK ASSESSMENT  08/23/2023    Cholesterol Lab  08/27/2023    Flu Vaccine (1) 09/01/2023    Annual Wellness Visit  08/23/2023    Creatinine Lab  09/12/2023

## 2023-09-13 ENCOUNTER — LAB (OUTPATIENT)
Dept: LAB | Facility: CLINIC | Age: 78
End: 2023-09-13
Payer: COMMERCIAL

## 2023-09-13 ENCOUNTER — OFFICE VISIT (OUTPATIENT)
Dept: INTERNAL MEDICINE | Facility: CLINIC | Age: 78
End: 2023-09-13
Payer: COMMERCIAL

## 2023-09-13 VITALS
BODY MASS INDEX: 28.51 KG/M2 | HEART RATE: 56 BPM | DIASTOLIC BLOOD PRESSURE: 74 MMHG | SYSTOLIC BLOOD PRESSURE: 152 MMHG | WEIGHT: 171.1 LBS | OXYGEN SATURATION: 98 % | HEIGHT: 65 IN

## 2023-09-13 DIAGNOSIS — M79.642 BILATERAL HAND PAIN: ICD-10-CM

## 2023-09-13 DIAGNOSIS — R25.2 MUSCLE CRAMP: ICD-10-CM

## 2023-09-13 DIAGNOSIS — L60.3 NAIL DYSTROPHY: ICD-10-CM

## 2023-09-13 DIAGNOSIS — E78.5 HYPERLIPIDEMIA, UNSPECIFIED HYPERLIPIDEMIA TYPE: ICD-10-CM

## 2023-09-13 DIAGNOSIS — I10 HYPERTENSION GOAL BP (BLOOD PRESSURE) < 140/90: ICD-10-CM

## 2023-09-13 DIAGNOSIS — M26.609 TMJ (TEMPOROMANDIBULAR JOINT SYNDROME): ICD-10-CM

## 2023-09-13 DIAGNOSIS — M79.641 BILATERAL HAND PAIN: ICD-10-CM

## 2023-09-13 DIAGNOSIS — Z00.00 ENCOUNTER FOR MEDICARE ANNUAL WELLNESS EXAM: Primary | ICD-10-CM

## 2023-09-13 DIAGNOSIS — L29.9 EAR ITCH: ICD-10-CM

## 2023-09-13 LAB
ALBUMIN SERPL BCG-MCNC: 4.6 G/DL (ref 3.5–5.2)
ALP SERPL-CCNC: 52 U/L (ref 35–104)
ALT SERPL W P-5'-P-CCNC: 18 U/L (ref 0–50)
ANION GAP SERPL CALCULATED.3IONS-SCNC: 10 MMOL/L (ref 7–15)
AST SERPL W P-5'-P-CCNC: 27 U/L (ref 0–45)
BILIRUB SERPL-MCNC: 0.3 MG/DL
BUN SERPL-MCNC: 26 MG/DL (ref 8–23)
CALCIUM SERPL-MCNC: 9.9 MG/DL (ref 8.8–10.2)
CHLORIDE SERPL-SCNC: 106 MMOL/L (ref 98–107)
CHOLEST SERPL-MCNC: 195 MG/DL
CREAT SERPL-MCNC: 0.84 MG/DL (ref 0.51–0.95)
DEPRECATED HCO3 PLAS-SCNC: 25 MMOL/L (ref 22–29)
EGFRCR SERPLBLD CKD-EPI 2021: 71 ML/MIN/1.73M2
ERYTHROCYTE [DISTWIDTH] IN BLOOD BY AUTOMATED COUNT: 13.2 % (ref 10–15)
FERRITIN SERPL-MCNC: 109 NG/ML (ref 11–328)
FOLATE SERPL-MCNC: 16.9 NG/ML (ref 4.6–34.8)
GLUCOSE SERPL-MCNC: 92 MG/DL (ref 70–99)
HCT VFR BLD AUTO: 39 % (ref 35–47)
HDLC SERPL-MCNC: 74 MG/DL
HGB BLD-MCNC: 12.9 G/DL (ref 11.7–15.7)
IRON BINDING CAPACITY (ROCHE): 293 UG/DL (ref 240–430)
IRON SATN MFR SERPL: 15 % (ref 15–46)
IRON SERPL-MCNC: 43 UG/DL (ref 37–145)
LDLC SERPL CALC-MCNC: 105 MG/DL
MAGNESIUM SERPL-MCNC: 2.7 MG/DL (ref 1.7–2.3)
MCH RBC QN AUTO: 29.6 PG (ref 26.5–33)
MCHC RBC AUTO-ENTMCNC: 33.1 G/DL (ref 31.5–36.5)
MCV RBC AUTO: 89 FL (ref 78–100)
NONHDLC SERPL-MCNC: 121 MG/DL
PLATELET # BLD AUTO: 233 10E3/UL (ref 150–450)
POTASSIUM SERPL-SCNC: 4.8 MMOL/L (ref 3.4–5.3)
PROT SERPL-MCNC: 7.2 G/DL (ref 6.4–8.3)
RBC # BLD AUTO: 4.36 10E6/UL (ref 3.8–5.2)
SODIUM SERPL-SCNC: 141 MMOL/L (ref 136–145)
TRIGL SERPL-MCNC: 78 MG/DL
TSH SERPL DL<=0.005 MIU/L-ACNC: 3.62 UIU/ML (ref 0.3–4.2)
WBC # BLD AUTO: 5.8 10E3/UL (ref 4–11)

## 2023-09-13 PROCEDURE — 83550 IRON BINDING TEST: CPT | Performed by: PATHOLOGY

## 2023-09-13 PROCEDURE — 36415 COLL VENOUS BLD VENIPUNCTURE: CPT | Performed by: PATHOLOGY

## 2023-09-13 PROCEDURE — 84443 ASSAY THYROID STIM HORMONE: CPT | Performed by: PATHOLOGY

## 2023-09-13 PROCEDURE — 80053 COMPREHEN METABOLIC PANEL: CPT | Performed by: PATHOLOGY

## 2023-09-13 PROCEDURE — 80061 LIPID PANEL: CPT | Performed by: PATHOLOGY

## 2023-09-13 PROCEDURE — 83735 ASSAY OF MAGNESIUM: CPT | Performed by: PATHOLOGY

## 2023-09-13 PROCEDURE — 82728 ASSAY OF FERRITIN: CPT | Performed by: PATHOLOGY

## 2023-09-13 PROCEDURE — 85027 COMPLETE CBC AUTOMATED: CPT | Performed by: PATHOLOGY

## 2023-09-13 PROCEDURE — G0439 PPPS, SUBSEQ VISIT: HCPCS | Performed by: INTERNAL MEDICINE

## 2023-09-13 PROCEDURE — 99000 SPECIMEN HANDLING OFFICE-LAB: CPT | Performed by: PATHOLOGY

## 2023-09-13 PROCEDURE — 83540 ASSAY OF IRON: CPT | Performed by: PATHOLOGY

## 2023-09-13 PROCEDURE — 82746 ASSAY OF FOLIC ACID SERUM: CPT | Performed by: INTERNAL MEDICINE

## 2023-09-13 PROCEDURE — 99214 OFFICE O/P EST MOD 30 MIN: CPT | Mod: 25 | Performed by: INTERNAL MEDICINE

## 2023-09-13 RX ORDER — ATENOLOL 25 MG/1
25 TABLET ORAL DAILY
Qty: 90 TABLET | Refills: 3 | Status: SHIPPED | OUTPATIENT
Start: 2023-09-13 | End: 2023-09-21

## 2023-09-13 RX ORDER — LOVASTATIN 20 MG
20 TABLET ORAL AT BEDTIME
Qty: 90 TABLET | Refills: 3 | Status: SHIPPED | OUTPATIENT
Start: 2023-09-13 | End: 2023-09-15 | Stop reason: ALTCHOICE

## 2023-09-13 NOTE — NURSING NOTE
"Lidia Jenkins is a 78 year old female patient that presents today in clinic for the following:    Chief Complaint   Patient presents with    Establish Care    Physical     Hands hurt during winter.  Finger nails splitting.     The patient's allergies and medications were reviewed as noted. A set of vitals were recorded as noted without incident: BP (!) 156/82 (BP Location: Right arm, Patient Position: Sitting, Cuff Size: Adult Regular)   Pulse 56   Ht 1.651 m (5' 5\")   Wt 77.6 kg (171 lb 1.6 oz)   SpO2 98%   BMI 28.47 kg/m  . The patient does not have any other questions for the provider.    Paola Bailey, EMT at 1:27 PM on 9/13/2023.  Primary care clinic: [phone]    "

## 2023-09-13 NOTE — PROGRESS NOTES
AUDIOLOGY REPORT    SUBJECTIVE:  Lidia Jenkins is a 78 year old female who was seen in Audiology at the [facility] on 9/28/23 for audiologic evaluation, referred by Jessica Cheema PA-C.  The patient has been seen previously in this clinic on 9/26/19 for assessment and results indicated normal hearing sloping to a mild to moderate sensorineural hearing loss bilaterally.  At that time, she elected not to pursue a trial with amplification.  The patient reports her hearing seems to have gradually worsened as she is now more reliant on captioning on TV.  She notes bilateral tinnitus but it is not bothersome.  Patient denies dizziness/falls, ear pain, aural fullness, and drainage.  She has never had ear surgery.  There is no history of noise exposure.      OBJECTIVE:  Abuse Screening:  Do you feel unsafe at home or work/school? No  Do you feel threatened by someone? No  Does anyone try to keep you from having contact with others, or doing things outside of your home? No  Physical signs of abuse present? No     Fall Risk Screen:  1. Have you fallen two or more times in the past year? No  2. Have you fallen and had an injury in the past year? No  Is patient a fall risk? No  Referral initiated: No  Fall Risk Assessment Completed by Audiology    Otoscopic exam indicates ears are clear of cerumen bilaterally.     Pure Tone Thresholds assessed using conventional audiometry with good reliability from 250-8000 Hz bilaterally using circumaural headphones and insert earphones.        RIGHT: Normal hearing through 1000 Hz, sloping to a mild to moderately severe sensorineural hearing loss at 2000 Hz and above.     LEFT:  Normal hearing through 1000 Hz, sloping to a mild to moderately severe sensorineural hearing loss at 2000 Hz and above.     Tympanogram:    RIGHT: normal eardrum mobility    LEFT:   normal eardrum mobility    Reflexes (reported by stimulus ear):  RIGHT:  Ipsilateral is absent  RIGHT: Contralateral is absent  LEFT:   Ipsilateral is absent  LEFT:   Contralateral is absent    Speech Reception Threshold:    RIGHT: 20 dB HL    LEFT:   20 dB HL  Word Recognition Score:     RIGHT: 92% at 65 dB HL using NU-6 recorded word list.    LEFT:   96% at 65 dB HL using NU-6 recorded word list.      ASSESSMENT:   Sensorineural hearing loss in the high frequencies bilaterally    Compared to patient's previous audiogram dated 9/26/19, hearing in the right ear has decreased 10 dB at 9755-5714 Hz.  Hearing in the left ear has decreased 10 dB at 500, 3000 and 4000 Hz.     Today s results were discussed with the patient in detail.     PLAN:    Discussed that patient could consider hearing aid trial. She is not interested at this time.   Hearing should be rechecked in 1-2 years or sooner if changes.    Patient will follow up with her PCP Jessica Cheema PA-C regarding today's results and recommendations.      The patient expressed understanding and agreement with this plan.    Sourav Cedillo, CCC-A, [facility]  Licensed Audiologist  [place] #[id number]    Enclosure: audiogram    Cc Jessica Cheema PA-C

## 2023-09-14 NOTE — PROGRESS NOTES
Medicare Annual Wellness Questionnaire  This 78 year old year old female presents for a Medicare Wellness Exam.    The following is Lidia Jenkins's care team:  Patient Care Team         Relationship Specialty Notifications Start End    Jessica Cheema PA-C PCP - General Family Medicine  7/5/22     Phone: [phone] Fax: [phone] [address] [place] [address] [place] [address]    Ana Pantoja MD MD Family Medicine - Sports Medicine  2/25/16     Phone: [phone] Fax: [phone]         [address] [facility] [address]    Castro Navarro MD MD Orthopedics  7/9/19     Phone: [phone] Fax: [phone]         [address] [facility] [address]    Piedad Lloyd MD MD Family Practice  7/9/19     Phone: [phone] Fax: [phone]         [address] [facility] [address]    Ana Gaytan PA-C Physician Assistant Dermatology  6/25/21     Phone: [phone] Fax: [phone]         [address] [facility] [address] [place] [address] [place] [address]    Willi Neal DO Assigned Musculoskeletal Provider   5/22/22     Phone: [phone] Fax: [phone]         [address] [facility] [address]    Conner Kemp MD MD Dermatology  7/5/22     Phone: [phone] Fax: [phone]         [facility] [address] [address]    Rachel Aguilar PA-C Physician Assistant Dermatology  8/22/22     Phone: [phone] Fax: [phone]         [address] [place] [address]    Jessica Cheema PA-C Assigned PCP   9/3/22     Phone: [phone] Fax: [phone] [address] [place] [address] [place] [address]    Bettina Grady MD MD Dermatology  6/1/23     Phone: [phone] Fax: [phone] [address] [facility] [address] [facility] [address]    Mal Leal, OD Assigned Surgical Provider   7/29/23     Phone: [phone] Fax: [phone] [address] [facility]  [facility] [address]    Whitney Edouard MD MD Ophthalmology  8/1/23      [address] [facility] [address]            Fall Risk Assessment:  Have you fallen 2 or more times in the last year? No  How many times were you injured due to a fall in the last year? 0    PHQ-2:  Over the last 2 weeks, how often have you been bothered by feeling down, depressed, or hopeless? Not at all (0)   Over the last 2 weeks, how often have you had little interest or pleasure in doing things? Not at all (0)     Social History:  What is your marital status?   Who lives in your household? ME  Does your home have loose rugs in the hallway: No  Does your home have grab bars in the bathroom: Yes   Does your home have handrails on the stairs? Yes   Does your home have poorly lit areas? No  Do you feel threatened or controlled by a partner, ex-partner or anyone in your life? No  Has anyone hurt you physically, for example by pushing, hitting, slapping or kicking you or forcing you to have sex? No  Do you need help with the phone, transportation, shopping, preparing meals, housework, laundry, medications or managing money? No    Sexual Health:  Are you sexually active? No  Have you had any sexually transmitted infections in the last year? No  Do you have any sexual concerns? No  Women: What year did you stop having periods (approximate age)? N/a  Women: Any vaginal bleeding in the last year? No  Women: Have you ever had an abnormal Pap smear? No    General Health Assessment:  Have you noticed any hearing difficulties? Yes   Do you wear hearing aids? No  Have you seen a hearing professional such as an audiologist in the last 1 year? No  Do you have vision difficulty? Yes   Do you wear glasses or contacts? Yes   Have you seen an eye doctor in the last 1 year? Yes   How many servings of fruits and vegetables do you eat a day? 1  How often do you exercise in a week? 4-5  How long and what kind of exercise do you do?  WALKING 30-50 MIN    Tobacco and Alcohol History:  Do you use tobacco/nicotine products? No  Do you use any other drugs? No  Do you drink alcohol? Yes   If you drink alcohol, how many drinks per week? 3    Advance Directive:  Have you completed an Advance Directives document? Yes   If yes, have you given a copy to the clinic? Yes   Do you need information on Advance Directives? No    Paola Bailey, EMT at 7:11 PM on 9/13/2023.  Primary care clinic: [phone]  Answers submitted by the patient for this visit:  Symptoms you have experienced in the last 30 days (Submitted on 9/10/2023)  General Symptoms: No  Skin Symptoms: No  HENT Symptoms: No  EYE SYMPTOMS: Yes  HEART SYMPTOMS: No  LUNG SYMPTOMS: No  INTESTINAL SYMPTOMS: No  URINARY SYMPTOMS: No  GYNECOLOGIC SYMPTOMS: No  BREAST SYMPTOMS: No  SKELETAL SYMPTOMS: Yes  BLOOD SYMPTOMS: No  NERVOUS SYSTEM SYMPTOMS: No  MENTAL HEALTH SYMPTOMS: No  Please answer the questions below to tell us what conditions you are experiencing: (Submitted on 9/10/2023)  Eye pain: No  Vision loss: No  Dry eyes: No  Watery eyes: Yes  Eye bulging: No  Double vision: No  Flashing of lights: No  Spots: No  Floaters: No  Redness: No  Crossed eyes: No  Tunnel Vision: No  Yellowing of eyes: No  Eye irritation: No  Please answer the questions below to tell us what condition you are experiencing: (Submitted on 9/10/2023)  Back pain: Yes  Muscle aches: No  Neck pain: No  Swollen joints: No  Joint pain: Yes  Bone pain: No  Muscle cramps: Yes  Muscle weakness: No  Joint stiffness: No  Bone fracture: No

## 2023-09-21 DIAGNOSIS — I10 HYPERTENSION GOAL BP (BLOOD PRESSURE) < 140/90: ICD-10-CM

## 2023-09-21 RX ORDER — ATENOLOL 25 MG/1
25 TABLET ORAL DAILY
Qty: 90 TABLET | Refills: 3 | Status: SHIPPED | OUTPATIENT
Start: 2023-09-21

## 2023-09-21 RX ORDER — LOVASTATIN 20 MG
20 TABLET ORAL AT BEDTIME
Qty: 90 TABLET | Refills: 3 | Status: SHIPPED | OUTPATIENT
Start: 2023-09-21 | End: 2023-10-05

## 2023-09-22 ENCOUNTER — MYC MEDICAL ADVICE (OUTPATIENT)
Dept: DERMATOLOGY | Facility: CLINIC | Age: 78
End: 2023-09-22
Payer: COMMERCIAL

## 2023-09-26 ENCOUNTER — OFFICE VISIT (OUTPATIENT)
Dept: OPHTHALMOLOGY | Facility: CLINIC | Age: 78
End: 2023-09-26
Payer: COMMERCIAL

## 2023-09-26 ENCOUNTER — PRE VISIT (OUTPATIENT)
Dept: OPHTHALMOLOGY | Facility: CLINIC | Age: 78
End: 2023-09-26

## 2023-09-26 DIAGNOSIS — H52.00 HYPERMETROPIA, UNSPECIFIED LATERALITY: ICD-10-CM

## 2023-09-26 DIAGNOSIS — H35.379 EPIRETINAL MEMBRANE, UNSPECIFIED LATERALITY: Primary | ICD-10-CM

## 2023-09-26 DIAGNOSIS — H04.123 DRY EYES, BILATERAL: ICD-10-CM

## 2023-09-26 DIAGNOSIS — H25.13 NUCLEAR SCLEROTIC CATARACT OF BOTH EYES: ICD-10-CM

## 2023-09-26 DIAGNOSIS — H52.4 PRESBYOPIA OF BOTH EYES: ICD-10-CM

## 2023-09-26 PROCEDURE — 92004 COMPRE OPH EXAM NEW PT 1/>: CPT | Performed by: OPHTHALMOLOGY

## 2023-09-26 PROCEDURE — 92134 CPTRZ OPH DX IMG PST SGM RTA: CPT | Performed by: OPHTHALMOLOGY

## 2023-09-26 PROCEDURE — 92015 DETERMINE REFRACTIVE STATE: CPT | Performed by: OPHTHALMOLOGY

## 2023-09-26 ASSESSMENT — REFRACTION_WEARINGRX
OD_SPHERE: +1.75
OS_AXIS: 110
OD_AXIS: 095
OS_ADD: +2.50
OD_CYLINDER: +0.50
OS_SPHERE: +0.75
OD_ADD: +2.50
OS_CYLINDER: +1.00

## 2023-09-26 ASSESSMENT — REFRACTION_MANIFEST
OS_CYLINDER: +1.00
OD_AXIS: 120
OS_ADD: +2.50
OD_CYLINDER: +0.75
OD_SPHERE: +1.25
OD_ADD: +2.50
OS_AXIS: 110
OS_SPHERE: +0.75

## 2023-09-26 ASSESSMENT — TONOMETRY
OS_IOP_MMHG: 17
OD_IOP_MMHG: 17
IOP_METHOD: TONOPEN

## 2023-09-26 ASSESSMENT — CONF VISUAL FIELD
OS_SUPERIOR_NASAL_RESTRICTION: 0
OD_INFERIOR_TEMPORAL_RESTRICTION: 0
OD_NORMAL: 1
OD_SUPERIOR_NASAL_RESTRICTION: 0
OS_INFERIOR_TEMPORAL_RESTRICTION: 0
OD_INFERIOR_NASAL_RESTRICTION: 0
OS_INFERIOR_NASAL_RESTRICTION: 0
OS_NORMAL: 1
OD_SUPERIOR_TEMPORAL_RESTRICTION: 0
METHOD: COUNTING FINGERS
OS_SUPERIOR_TEMPORAL_RESTRICTION: 0

## 2023-09-26 ASSESSMENT — VISUAL ACUITY
CORRECTION_TYPE: GLASSES
OS_BAT_HIGH: 20/100
OS_CC+: -2
OS_CC: 20/20
OD_BAT_HIGH: 20/100
OD_CC: 20/40
METHOD: SNELLEN - LINEAR

## 2023-09-26 ASSESSMENT — SLIT LAMP EXAM - LIDS
COMMENTS: NORMAL
COMMENTS: NORMAL

## 2023-09-26 ASSESSMENT — CUP TO DISC RATIO
OD_RATIO: 0.25
OS_RATIO: 0.25

## 2023-09-26 ASSESSMENT — EXTERNAL EXAM - RIGHT EYE: OD_EXAM: NORMAL

## 2023-09-26 ASSESSMENT — EXTERNAL EXAM - LEFT EYE: OS_EXAM: NORMAL

## 2023-09-26 NOTE — NURSING NOTE
Chief Complaints and History of Present Illnesses   Patient presents with    Cataract Evaluation     Pt here for cataract evaluation per Dr Leal.      Chief Complaint(s) and History of Present Illness(es)       Cataract Evaluation              Laterality: both eyes    Associated symptoms: blurred vision and haloes    Comments: Pt here for cataract evaluation per Dr Leal.               Comments    Pt noting decreased vision right eye> left eye. States right eye tears a lot as well. Pt states she has difficulty with fine print and needing more light. She will avoid driving at night. Pt does endorse halos around lights.     Twyla Macias, COA on 9/26/2023 at 10:35 AM

## 2023-09-26 NOTE — PROGRESS NOTES
HPI  Lidia Jenkins is a 78 year old female here for cataract evaluation.    HPI       Cataract Evaluation    In both eyes.  Associated symptoms include blurred vision and haloes. Additional comments: Pt here for cataract evaluation per Dr Leal.              Comments    Pt noting decreased vision right eye> left eye. States right eye tears a lot as well. Pt states she has difficulty with fine print and needing more light. She will avoid driving at night. Pt does endorse halos around lights.     OSMANY Nelson on 9/26/2023 at 10:35 AM            Last edited by Twyla Macias COA on 9/26/2023 10:35 AM.        Can't see street signs, can't see birding, halos at night.  Watering right eye intermittently.      PMH:   Past Medical History:   Diagnosis Date    Arthritis     Basal cell carcinoma     Cataract     Hyperlipidaemia LDL goal < 100     Hyperlipidemia     Hypertension goal BP (blood pressure) < 140/90     Psoriasis       POH: Glasses for hypermetropia/presbyopia, cataracts, no surgery, no trauma  Oc Meds: none  FH: Denies any glaucoma, age related macular degeneration, or other known eye diseases .         Assessment & Plan     1. Epiretinal membrane, unspecified laterality - Right Eye    2. Nuclear sclerotic cataract of both eyes - Both Eyes    3. Hypermetropia, unspecified laterality - Both Eyes    4. Presbyopia of both eyes - Both Eyes    5. Dry eyes, bilateral      Epiretinal membrane  right eye (primary encounter diagnosis)  Nuclear sclerotic cataract of both eyes - Both Eyes   Cataracts and epiretinal membrane likely approximately 50/50 cause of vision changes  Discussed with patient updating glasses first, if cataract worsens and epiretinal membrane stable can consider surgery  If patient wanting to consult with retina will refer.    Hypermetropia-  Comment: slight myopic shift right eye due to cataract   Patient appreciates vision improvement over glasses   Plan: manifest refraction done and  prescription for glasses given     (H04.123) Dry eyes, bilateral - Both Eyes  Comment: mild, no cornea signs  Plan: trial of artificial tear drops twice a day     -----------------------------------------------------------------------------------        Patient disposition:   Return in about 1 year (around 9/26/2024) for Comprehensive Exam, OCT macula OU.         Complete documentation of historical and exam elements from today's encounter can be found in the full encounter summary report (not reduplicated in this progress note). I personally obtained the chief complaint(s) and history of present illness.  I have confirmed and edited as necessary the CC, HPI, PMH/PSH, social history, FMH, ROS, and exam/neuro findings as obtained by the technician or others. I have examined this patient myself and I personally viewed the image(s) and studies listed above and the documentation reflects my findings and interpretation.  I formulated and edited as necessary the assessment and plan and discussed the findings and management plan with the patient and family.     Whitney Edouard MD

## 2023-09-28 ENCOUNTER — OFFICE VISIT (OUTPATIENT)
Dept: AUDIOLOGY | Facility: CLINIC | Age: 78
End: 2023-09-28
Payer: COMMERCIAL

## 2023-09-28 DIAGNOSIS — H91.90 CHANGE IN HEARING, UNSPECIFIED LATERALITY: ICD-10-CM

## 2023-09-28 DIAGNOSIS — H90.3 SENSORINEURAL HEARING LOSS (SNHL) OF BOTH EARS: Primary | ICD-10-CM

## 2023-09-28 PROCEDURE — 92550 TYMPANOMETRY & REFLEX THRESH: CPT | Performed by: AUDIOLOGIST-HEARING AID FITTER

## 2023-09-28 PROCEDURE — 92557 COMPREHENSIVE HEARING TEST: CPT | Performed by: AUDIOLOGIST-HEARING AID FITTER

## 2023-10-23 ENCOUNTER — MYC MEDICAL ADVICE (OUTPATIENT)
Dept: INTERNAL MEDICINE | Facility: CLINIC | Age: 78
End: 2023-10-23
Payer: COMMERCIAL

## 2023-10-23 DIAGNOSIS — I10 HYPERTENSION GOAL BP (BLOOD PRESSURE) < 140/90: ICD-10-CM

## 2023-10-23 RX ORDER — LOVASTATIN 40 MG
40 TABLET ORAL AT BEDTIME
Qty: 90 TABLET | Refills: 3 | Status: SHIPPED | OUTPATIENT
Start: 2023-10-23

## 2023-10-23 NOTE — TELEPHONE ENCOUNTER
lovastatin (MEVACOR) 40 MG tablet     Resent order to updated/alternative pharmacy for Pt care.   90 Tabs, 3 Refills sent to nia Mcduffie RN  Central Triage Red Flags/Med Refills

## 2023-12-11 ENCOUNTER — OFFICE VISIT (OUTPATIENT)
Dept: DERMATOLOGY | Facility: CLINIC | Age: 78
End: 2023-12-11
Payer: COMMERCIAL

## 2023-12-11 DIAGNOSIS — Z85.828 HISTORY OF BASAL CELL CARCINOMA: ICD-10-CM

## 2023-12-11 DIAGNOSIS — L82.0 INFLAMED SEBORRHEIC KERATOSIS: ICD-10-CM

## 2023-12-11 DIAGNOSIS — L21.9 DERMATITIS, SEBORRHEIC: Primary | ICD-10-CM

## 2023-12-11 DIAGNOSIS — D22.9 MULTIPLE PIGMENTED NEVI: ICD-10-CM

## 2023-12-11 DIAGNOSIS — D18.01 CHERRY ANGIOMA: ICD-10-CM

## 2023-12-11 PROCEDURE — 99213 OFFICE O/P EST LOW 20 MIN: CPT | Mod: 25 | Performed by: DERMATOLOGY

## 2023-12-11 PROCEDURE — 17110 DESTRUCTION B9 LES UP TO 14: CPT | Mod: GC | Performed by: DERMATOLOGY

## 2023-12-11 RX ORDER — KETOCONAZOLE 20 MG/G
CREAM TOPICAL DAILY
Qty: 60 G | Refills: 4 | Status: SHIPPED | OUTPATIENT
Start: 2023-12-11

## 2023-12-11 NOTE — NURSING NOTE
Dermatology Rooming Note    Lidia Jenkins's goals for this visit include:   Chief Complaint   Patient presents with    Derm Problem     Lidia is here today for a skin check. Has a rough brown spot upper back. R eyebrow is itchy and has a spot in the hair line.

## 2023-12-11 NOTE — PATIENT INSTRUCTIONS
For the itchy areas on the face and ears  Apply the Ketoconazole cream to the face daily as needed   Can also use a dandruff shampoo as a face wash a few times a week    Cryotherapy Instructions    For the areas treated with liquid nitrogen (cryotherapy or freezing) today, they are expected to get pink, puffy, and perhaps even blister. The area should then crust up and fall off and the goal is to have nice new skin underneath. There is nothing special that you need to do for these areas. You can wash them as you do normal skin.     Sometimes a blister develops; if a blister does develop do NOT pop it. However, if it breaks open on its own, be sure to wash it with soap and water daily and put plain vasaline or petroleum jelly and a bandaid on it until the skin is healed.     Please call the clinic if you have any questions or concerns.

## 2023-12-11 NOTE — LETTER
12/11/2023       RE: Lidia Jenkins  [address] [facility] [address]     Dear Colleague,    Thank you for referring your patient, Lidia Jenkins, to the [facility] [place] [facility]. Please see a copy of my visit note below.    [facility] Dermatology Note  Encounter Date: Dec 11, 2023  Office Visit     Dermatology Problem List:  1. Psoriasis  2. History of NMSC,  - BCC, back and right patella  3. Inflamed SK   - LN2 preformed 12/11/23  4. Seborrheic Dermatitis - eyebrows and ears   - Ketoconazole cream BID PRN   ____________________________________________    Assessment & Plan:    #Skin cancer screening with multiple benign findings including solar lentigines, multiple pigmented nevi, cherry angiomas and solar lentiginosis   - ABCDEs: Counseled ABCDEs of melanoma: Asymmetry, Border (irregularity), Color (not uniform, changes in color), Diameter (greater than 6 mm which is about the size of a pencil eraser), and Evolving (any changes in preexisting moles).  - Sun protection: Counseled SPF30+ sunscreen, UPF clothing, sun avoidance, tanning bed avoidance.     # History of nonmelanoma skin cancer, no clincial evidence of recurrence.   -Recommend annual screenings  -We discussed sun protection.    # Seborrheic Dermatitis of the ears and scalp   - Recommend starting Ketoconazole cream BID PRN to areas of itch   - Recommend using Anti Dandruff shampoo to areas of rash a few times a week     # Inflamed Seborrheic Keratosis, Right Scalpula   - LN2 preformed, procedure note below     Procedures Performed:   - PROCEDURE - Cryotherapy: Right Scalpula: 1 Lesion were frozen with liquid nitrogen with a 10 sec thaw time for a total of 2 cycles. Usual warnings including bleeding, infection, recurrence, scarring, and pigmentation change were reviewed. After care instructions were reviewed and written hand out was provided.      Follow-up: 1 year(s) in-person, or earlier for new or changing lesions    Staff and Resident:     Soledad Andrade MD  Dermatology Resident, PGY-3  [facility]  Pager: [phone]  I was present for the entire procedure. Bettina Grady MD   I, Bettina Grady MD, saw this patient with the resident and agree with the resident s findings and plan of care as documented in the resident s note.    ____________________________________________    CC: No chief complaint on file.    HPI:  Ms. Lidia Jenkins is a(n) 78 year old female who presents today as a return patient for a skin check.. Last seen in dermatology by  MINNIE Aguilar PA-C 8/24/22, no biopsies were done at this time.     - Notes to have a few itchy spots in her eyes and eyebrows  - Has a hx of NMSC, only has a itchy, raised spot of concern on her right upper back today     Patient is otherwise feeling well, without additional skin concerns.    Labs Reviewed:  N/A    Physical Exam:  Vitals: There were no vitals taken for this visit.  SKIN: Full skin, which includes the head/face, both arms, chest, back, abdomen,both legs, buttocks, digits and/or nails, was examined.  - There are dome shaped bright red papules on the trunk..   - Multiple regular brown pigmented macules and papules are identified on the face, trunk and extremities.   - Right patella, No evidence of recurrent disease   - Scattered brown macules on sun exposed areas, diffusely.  - There are waxy stuck on tan to brown papules on the face, trunk and extremities including the right upper back.  - Right scapula with hyperkeratotic stuck on plaque with erythema   - No other lesions of concern on areas examined.     Medications:  Current Outpatient Medications   Medication    acyclovir (ZOVIRAX) 400 MG tablet    atenolol (TENORMIN) 25 MG tablet    cholecalciferol (VITAMIN D) 1000 UNIT tablet    lovastatin (MEVACOR) 40 MG tablet     No current facility-administered medications for this  visit.      Past Medical History:   Patient Active Problem List   Diagnosis    Hypertension goal BP (blood pressure) < 140/90    History of basal cell carcinoma    Advance care planning     Past Medical History:   Diagnosis Date    Arthritis     Basal cell carcinoma     Cataract     Hyperlipidaemia LDL goal < 100     Hyperlipidemia     Hypertension goal BP (blood pressure) < 140/90     Psoriasis         CC Referred Self, MD  No address on file on close of this encounter.

## 2023-12-11 NOTE — PROGRESS NOTES
[facility] Dermatology Note  Encounter Date: Dec 11, 2023  Office Visit     Dermatology Problem List:  1. Psoriasis  2. History of NMSC,  - BCC, back and right patella  3. Inflamed SK   - LN2 preformed 12/11/23  4. Seborrheic Dermatitis - eyebrows and ears   - Ketoconazole cream BID PRN   ____________________________________________    Assessment & Plan:    #Skin cancer screening with multiple benign findings including solar lentigines, multiple pigmented nevi, cherry angiomas and solar lentiginosis   - ABCDEs: Counseled ABCDEs of melanoma: Asymmetry, Border (irregularity), Color (not uniform, changes in color), Diameter (greater than 6 mm which is about the size of a pencil eraser), and Evolving (any changes in preexisting moles).  - Sun protection: Counseled SPF30+ sunscreen, UPF clothing, sun avoidance, tanning bed avoidance.     # History of nonmelanoma skin cancer, no clincial evidence of recurrence.   -Recommend annual screenings  -We discussed sun protection.    # Seborrheic Dermatitis of the ears and scalp   - Recommend starting Ketoconazole cream BID PRN to areas of itch   - Recommend using Anti Dandruff shampoo to areas of rash a few times a week     # Inflamed Seborrheic Keratosis, Right Scalpula   - LN2 preformed, procedure note below     Procedures Performed:   - PROCEDURE - Cryotherapy: Right Scalpula: 1 Lesion were frozen with liquid nitrogen with a 10 sec thaw time for a total of 2 cycles. Usual warnings including bleeding, infection, recurrence, scarring, and pigmentation change were reviewed. After care instructions were reviewed and written hand out was provided.     Follow-up: 1 year(s) in-person, or earlier for new or changing lesions    Staff and Resident:     Soledad Andrade MD  Dermatology Resident, PGY-3  [facility]  Pager: [phone]  I was present for the entire procedure. Bettina Grady MD   I, Bettina Grady MD, saw this patient with the resident  and agree with the resident s findings and plan of care as documented in the resident s note.    ____________________________________________    CC: No chief complaint on file.    HPI:  Ms. Lidia Jenkins is a(n) 78 year old female who presents today as a return patient for a skin check.. Last seen in dermatology by  MINNIE Aguilar PA-C 8/24/22, no biopsies were done at this time.     - Notes to have a few itchy spots in her eyes and eyebrows  - Has a hx of NMSC, only has a itchy, raised spot of concern on her right upper back today     Patient is otherwise feeling well, without additional skin concerns.    Labs Reviewed:  N/A    Physical Exam:  Vitals: There were no vitals taken for this visit.  SKIN: Full skin, which includes the head/face, both arms, chest, back, abdomen,both legs, buttocks, digits and/or nails, was examined.  - There are dome shaped bright red papules on the trunk..   - Multiple regular brown pigmented macules and papules are identified on the face, trunk and extremities.   - Right patella, No evidence of recurrent disease   - Scattered brown macules on sun exposed areas, diffusely.  - There are waxy stuck on tan to brown papules on the face, trunk and extremities including the right upper back.  - Right scapula with hyperkeratotic stuck on plaque with erythema   - No other lesions of concern on areas examined.     Medications:  Current Outpatient Medications   Medication    acyclovir (ZOVIRAX) 400 MG tablet    atenolol (TENORMIN) 25 MG tablet    cholecalciferol (VITAMIN D) 1000 UNIT tablet    lovastatin (MEVACOR) 40 MG tablet     No current facility-administered medications for this visit.      Past Medical History:   Patient Active Problem List   Diagnosis    Hypertension goal BP (blood pressure) < 140/90    History of basal cell carcinoma    Advance care planning     Past Medical History:   Diagnosis Date    Arthritis     Basal cell carcinoma     Cataract     Hyperlipidaemia LDL goal < 100      Hyperlipidemia     Hypertension goal BP (blood pressure) < 140/90     Psoriasis         CC Referred Self, MD  No address on file on close of this encounter.

## 2023-12-15 ENCOUNTER — TELEPHONE (OUTPATIENT)
Dept: DERMATOLOGY | Facility: CLINIC | Age: 78
End: 2023-12-15
Payer: COMMERCIAL

## 2023-12-15 NOTE — TELEPHONE ENCOUNTER
Via phone patient was scheduled for the following:    Appointment type: Return  Provider: Dr. Grady  Return date: 12-11-24  Specialty phone number: [phone]

## 2024-05-13 ENCOUNTER — TELEPHONE (OUTPATIENT)
Dept: INTERNAL MEDICINE | Facility: CLINIC | Age: 79
End: 2024-05-13
Payer: COMMERCIAL

## 2024-05-13 NOTE — TELEPHONE ENCOUNTER
Patient confirmed scheduled appointment:  Date: 9/16  Time: 12:30p  Visit type: epp  Provider: Dr. Serrano

## 2024-05-16 ENCOUNTER — TRANSFERRED RECORDS (OUTPATIENT)
Dept: HEALTH INFORMATION MANAGEMENT | Facility: CLINIC | Age: 79
End: 2024-05-16
Payer: COMMERCIAL

## 2024-05-20 NOTE — PROGRESS NOTES
Called & talked with the patient informing him that his MRI was approved and scheduled for Tuesday 11/24/2020 8am arrival for an 8:15AM scan at [facility]. Patient went ahead and scheduled his f/u appointment at [facility] on Tuesday [facility]. HPI  Lidia Jenkins is a 78 year old female here for cataract follow-up.  Vision right eye has gotten more cloudy, bothered consistently right eye every day.  Affecting reading and driving.  Denies metamorphopsia.  HPI       Cloudy Vision Right Eye    In right eye.  Treatments tried include no treatments.  Pain was noted as 0/10. Additional comments: Patient reports that vision right eye seems cloudier over since last visit 9/26/23             Comments    Patient questions if Cataracts is an issues.   Feel like vision left eye is doing all the work not sure there are any changes with it.   Denies any double vision or glare noticed each eye.   Tearing bothers with right eye more than left eye.     DOMINGA Bland COT 9:03 AM May 21, 2024                   Last edited by Audra Heranndez COT on 5/21/2024  9:03 AM.                PMH:   Past Medical History:   Diagnosis Date    Arthritis     Basal cell carcinoma     Cataract     Hyperlipidaemia LDL goal < 100     Hyperlipidemia     Hypertension goal BP (blood pressure) < 140/90     Psoriasis       POH: Glasses for hypermetropia/presbyopia, cataracts R>left, epiretinal membrane right eye,  no surgery, no trauma  Oc Meds: none  FH: Denies any glaucoma, age related macular degeneration, or other known eye diseases .         Assessment & Plan     1. Nuclear sclerotic cataract of both eyes - Both Eyes    2. Epiretinal membrane, unspecified laterality - Right Eye    3. Hypermetropia, unspecified laterality - Both Eyes    4. Presbyopia of both eyes - Both Eyes      Nuclear sclerotic cataract of both eyes - Both Eyes   Epiretinal membrane right eye   Comment:   The patient was advised that the current significant symptoms of blurred vision and/or glare were primarily secondary to cataracts.  The risks, benefits, and alternatives to cataract surgery with intraocular lens implant were discussed at length, and informed consent was obtained.  We discussed seeing a retinal  provider prior to cataract surgery since the epiretinal membrane could still be limiting her vision and may warrant surgical intervention, she prefers to see retina after cataract surgery if needed.  We discussed post operative drops to reduce the risk of cystoid macular edema.   The patient will schedule cataract surgery in the near future.  Pre-operative measurements were taken with the IOL Master to plan appropriate lens power and lens implant options were discussed including monofocal versus multifocal, toric for astigmatism, monovision, and refractive aim. The patient was advised of the necessity for a pre-operative physical with their primary physician.  The patient was also asked to arrange to have someone at home when returning from surgery.    Plan:  Phacoemulsification with intraocular lens implant right eye       Surgical plan:  Best corrected visual acuity today is 20/50  1.5  +NSC    posterior vacuoles     Special equipment/needs:    Anesthesia: MAC/Topical  Able to lie flat:  No  Dilation: Good   6.5-7mm  Alpha blocker/Flomax: No  Malyugen/Iris expansion: Not needed  Anticoagulants: No  Guttata: No  Diabetes: No  Pseudoexfoliation: No pseudoexfoliation  Trauma: No  Trypan Blue: No  Refractive goal: plano    Cylinder:  declines toric  Pac virtual  Post op day 1  pm     Hypermetropia/presbyopia-   Comment: slight myopic shift right eye due to cataract , visual acuity limited by cataract   Plan: hold on glasses prescription       -----------------------------------------------------------------------------------        Patient disposition:   Return in about 2 months (around 7/21/2024) for post-op cataract sx, POD  1  OD.         Complete documentation of historical and exam elements from today's encounter can be found in the full encounter summary report (not reduplicated in this progress note). I personally obtained the chief complaint(s) and history of present illness.  I have confirmed and edited as  necessary the CC, HPI, PMH/PSH, social history, FMH, ROS, and exam/neuro findings as obtained by the technician or others. I have examined this patient myself and I personally viewed the image(s) and studies listed above and the documentation reflects my findings and interpretation.  I formulated and edited as necessary the assessment and plan and discussed the findings and management plan with the patient and family.     Whitney Edouard MD

## 2024-05-21 ENCOUNTER — OFFICE VISIT (OUTPATIENT)
Dept: OPHTHALMOLOGY | Facility: CLINIC | Age: 79
End: 2024-05-21
Payer: COMMERCIAL

## 2024-05-21 DIAGNOSIS — H25.13 NUCLEAR SCLEROTIC CATARACT OF BOTH EYES: Primary | ICD-10-CM

## 2024-05-21 DIAGNOSIS — H52.4 PRESBYOPIA OF BOTH EYES: ICD-10-CM

## 2024-05-21 DIAGNOSIS — H35.379 EPIRETINAL MEMBRANE, UNSPECIFIED LATERALITY: ICD-10-CM

## 2024-05-21 DIAGNOSIS — H52.00 HYPERMETROPIA, UNSPECIFIED LATERALITY: ICD-10-CM

## 2024-05-21 DIAGNOSIS — H25.13 NUCLEAR SCLEROTIC CATARACT OF BOTH EYES: ICD-10-CM

## 2024-05-21 PROCEDURE — 92134 CPTRZ OPH DX IMG PST SGM RTA: CPT | Performed by: OPHTHALMOLOGY

## 2024-05-21 PROCEDURE — 76519 ECHO EXAM OF EYE: CPT | Mod: RT | Performed by: OPHTHALMOLOGY

## 2024-05-21 PROCEDURE — 99214 OFFICE O/P EST MOD 30 MIN: CPT | Performed by: OPHTHALMOLOGY

## 2024-05-21 ASSESSMENT — VISUAL ACUITY
OS_SC: J1
OS_BAT_HIGH: 20/40
METHOD: SNELLEN - LINEAR
OS_CC+: -3
OD_BAT_HIGH: 20/40
OD_CC: J5
OS_CC: 20/25
OD_CC: 20/50
CORRECTION_TYPE: GLASSES

## 2024-05-21 ASSESSMENT — REFRACTION_WEARINGRX
OD_SPHERE: +1.25
OD_CYLINDER: +0.75
OS_CYLINDER: +1.00
OD_AXIS: 120
SPECS_TYPE: PAL
OS_SPHERE: +0.75
OD_ADD: +2.50
OS_ADD: +2.50
OS_AXIS: 110

## 2024-05-21 ASSESSMENT — EXTERNAL EXAM - LEFT EYE: OS_EXAM: NORMAL

## 2024-05-21 ASSESSMENT — REFRACTION_MANIFEST
OD_ADD: +2.75
OS_ADD: +2.75
OD_CYLINDER: +0.25
OS_CYLINDER: +0.75
OS_AXIS: 100
OD_AXIS: 118
OS_SPHERE: +0.75
OD_SPHERE: +1.50

## 2024-05-21 ASSESSMENT — SLIT LAMP EXAM - LIDS
COMMENTS: NORMAL
COMMENTS: NORMAL

## 2024-05-21 ASSESSMENT — CUP TO DISC RATIO
OD_RATIO: 0.25
OS_RATIO: 0.25

## 2024-05-21 ASSESSMENT — CONF VISUAL FIELD
OS_SUPERIOR_TEMPORAL_RESTRICTION: 0
OD_INFERIOR_NASAL_RESTRICTION: 0
OS_INFERIOR_NASAL_RESTRICTION: 0
OS_INFERIOR_TEMPORAL_RESTRICTION: 0
OS_SUPERIOR_NASAL_RESTRICTION: 0
OD_NORMAL: 1
METHOD: COUNTING FINGERS
OD_SUPERIOR_TEMPORAL_RESTRICTION: 0
OD_SUPERIOR_NASAL_RESTRICTION: 0
OD_INFERIOR_TEMPORAL_RESTRICTION: 0
OS_NORMAL: 1

## 2024-05-21 ASSESSMENT — TONOMETRY
OD_IOP_MMHG: 18
IOP_METHOD: ICARE
OS_IOP_MMHG: 16

## 2024-05-21 ASSESSMENT — EXTERNAL EXAM - RIGHT EYE: OD_EXAM: NORMAL

## 2024-05-21 NOTE — NURSING NOTE
Chief Complaints and History of Present Illnesses   Patient presents with    Cloudy Vision Right Eye     Patient reports that vision right eye seems cloudier over since last visit 9/26/23     Chief Complaint(s) and History of Present Illness(es)       Cloudy Vision Right Eye              Laterality: right eye    Treatments tried: no treatments    Pain scale: 0/10    Comments: Patient reports that vision right eye seems cloudier over since last visit 9/26/23              Comments    Patient questions if Cataracts is an issues.   Feel like vision left eye is doing all the work not sure there are any changes with it.   Denies any double vision or glare noticed each eye.   Tearing bothers with right eye more than left eye.     Audra Hernandez, COT COT 9:03 AM May 21, 2024

## 2024-05-23 ENCOUNTER — TELEPHONE (OUTPATIENT)
Dept: OPHTHALMOLOGY | Facility: CLINIC | Age: 79
End: 2024-05-23
Payer: COMMERCIAL

## 2024-05-23 NOTE — TELEPHONE ENCOUNTER
Patient called in to schedule surgery. Message sent to Dr Edouard requesting case request if that is the plan.  Leanne Plascencia on 5/23/2024 at 4:24 PM

## 2024-05-30 PROBLEM — H25.13 NUCLEAR SCLEROTIC CATARACT OF BOTH EYES: Status: ACTIVE | Noted: 2024-05-21

## 2024-05-30 NOTE — TELEPHONE ENCOUNTER
Called patient to schedule surgery with Dr Edouard    Spoke with: patient    Date of Surgery: 7/29 (Right)      Patient aware of approximate arrival time: Yes at 10:30 a.m.     Location of surgery: [facility] (Right)      Pre-Op H&P: PAC  at 7/25     Informed patient that they need to call to schedule pre-op H&P with  within 30 days of surgery date: Not Applicable    Post-Op Appt Dates: 7/30, 8/5     Discussed with patient pre-op RN will call 2-3 days prior to surgery with arrival time and instructions:  Not Applicable    Discussed with patient PAC RN will provide arrival time and instructions for surgery at the time of the appointment: [[place] locations only]: Yes      Standard Surgery Packet Sent: Yes 05/30/24  via [employer] Message      Surgical Soap Discussed with Patient: No      Additional Information Sent in Packet: Post-op Appointment Itinerary      Informed patient that they will need an adult  to bring patient home from surgery: Yes  : Patient is aware of the need for an adult          Additional Comments:        All patients questions were answered and was instructed to review surgical packet and call back [phone] with any questions or concerns.       Leanne Plascencia on 5/30/2024 at 1:40 PM

## 2024-05-31 NOTE — TELEPHONE ENCOUNTER
FUTURE VISIT INFORMATION      SURGERY INFORMATION:  Date: 7/29/24  Location:  or  Surgeon:  Whitney Edouard MD   Anesthesia Type:  mac with topical  Procedure: RIGHT EYE PHACOEMULSIFICATION, CATARACT, WITH STANDARD INTRAOCULAR LENS IMPLANT INSERTION   Consult: ov 5/21    RECORDS REQUESTED FROM:       Primary Care Provider: [facility]    Pertinent Medical History: hypertension

## 2024-06-04 NOTE — TELEPHONE ENCOUNTER
I called Lidia and she says she just picked up a 3 month supply of atenolol from [facility] pharmacy.  She says [facility] should know this but she will call them again.    Manju Mcfadden RN     Detail Level: Zone Photo Preface (Leave Blank If You Do Not Want): Photographs were obtained today

## 2024-06-11 ENCOUNTER — HOSPITAL ENCOUNTER (OUTPATIENT)
Facility: AMBULATORY SURGERY CENTER | Age: 79
End: 2024-06-11
Attending: OPHTHALMOLOGY
Payer: COMMERCIAL

## 2024-06-11 ENCOUNTER — TELEPHONE (OUTPATIENT)
Dept: OPHTHALMOLOGY | Facility: CLINIC | Age: 79
End: 2024-06-11
Payer: COMMERCIAL

## 2024-06-11 NOTE — TELEPHONE ENCOUNTER
Called patient to schedule surgery with Dr Edouard    Spoke with: patient    Date of Surgery:  8/12 (Left)     Patient aware of approximate arrival time: Yes at late morning/early afternoon     Location of surgery:  [facility] (Left)     Pre-Op H&P: PAC  at 7/25     Informed patient that they need to call to schedule pre-op H&P with  within 30 days of surgery date: Not Applicable    Post-Op Appt Dates: 8/13, 9/9     Discussed with patient pre-op RN will call 2-3 days prior to surgery with arrival time and instructions:  Not Applicable    Discussed with patient PAC RN will provide arrival time and instructions for surgery at the time of the appointment: [[place] locations only]: Yes      Standard Surgery Packet Sent: Yes 06/11/24  via [employer] Message      Surgical Soap Discussed with Patient: No      Additional Information Sent in Packet: Post-op Appointment Itinerary      Informed patient that they will need an adult  to bring patient home from surgery: Yes  : Patient is aware of the need for an adult          Additional Comments:        All patients questions were answered and was instructed to review surgical packet and call back [phone] with any questions or concerns.       Leanne Plascencia on 6/11/2024 at 2:22 PM

## 2024-06-21 DIAGNOSIS — Z96.1 PSEUDOPHAKIA: Primary | ICD-10-CM

## 2024-06-21 RX ORDER — KETOROLAC TROMETHAMINE 4 MG/ML
SOLUTION/ DROPS OPHTHALMIC
Qty: 5 ML | Refills: 1 | Status: SHIPPED | OUTPATIENT
Start: 2024-06-21 | End: 2024-08-29

## 2024-06-21 RX ORDER — PREDNISOLONE ACETATE 10 MG/ML
SUSPENSION/ DROPS OPHTHALMIC
Qty: 5 ML | Refills: 1 | Status: SHIPPED | OUTPATIENT
Start: 2024-06-21 | End: 2024-08-29

## 2024-06-25 ENCOUNTER — ANCILLARY PROCEDURE (OUTPATIENT)
Dept: MAMMOGRAPHY | Facility: CLINIC | Age: 79
End: 2024-06-25
Attending: INTERNAL MEDICINE
Payer: COMMERCIAL

## 2024-06-25 ENCOUNTER — PRE VISIT (OUTPATIENT)
Dept: SURGERY | Facility: CLINIC | Age: 79
End: 2024-06-25
Payer: COMMERCIAL

## 2024-06-25 DIAGNOSIS — Z12.31 VISIT FOR SCREENING MAMMOGRAM: ICD-10-CM

## 2024-06-25 PROCEDURE — 77063 BREAST TOMOSYNTHESIS BI: CPT | Mod: GC | Performed by: STUDENT IN AN ORGANIZED HEALTH CARE EDUCATION/TRAINING PROGRAM

## 2024-06-25 PROCEDURE — 77067 SCR MAMMO BI INCL CAD: CPT | Mod: GC | Performed by: STUDENT IN AN ORGANIZED HEALTH CARE EDUCATION/TRAINING PROGRAM

## 2024-07-20 NOTE — PATIENT INSTRUCTIONS
FUTURE APPOINTMENTS  Follow up as needed.    TOPICAL STEROID INSTRUCTIONS  Triamcinolone 0.1% cream.  Apply two times per day for 10-14 days. Then, use only when needed.  1. Wash hands before applying topical steroid.  2. Apply sparingly (just enough to rub in) onto affected areas of the trunk and legs.  (Use the adult fingertip unit (FTU) as a guide.) Apply no more than 1 FTU per area.      This higher strength steroid should never be used on face nor groin.    After the initial treatment, topical steroid may be used as needed for flare-ups but only for short-term treatment. If you are using this for prolonged periods of time to control flare-ups, return to clinic for re-evaluation of treatment.    Keep in mind to also regularly use moisturizer, as this preventative measure can help maintain your skin's natural protective moisture barrier.     Capillary refill less/equal to 2 seconds

## 2024-07-25 ENCOUNTER — PRE VISIT (OUTPATIENT)
Dept: SURGERY | Facility: CLINIC | Age: 79
End: 2024-07-25

## 2024-07-25 ENCOUNTER — VIRTUAL VISIT (OUTPATIENT)
Dept: SURGERY | Facility: CLINIC | Age: 79
End: 2024-07-25
Payer: COMMERCIAL

## 2024-07-25 ENCOUNTER — ANESTHESIA EVENT (OUTPATIENT)
Dept: SURGERY | Facility: AMBULATORY SURGERY CENTER | Age: 79
End: 2024-07-25
Payer: COMMERCIAL

## 2024-07-25 VITALS — HEIGHT: 65 IN | BODY MASS INDEX: 27.49 KG/M2 | WEIGHT: 165 LBS

## 2024-07-25 DIAGNOSIS — H25.13 NUCLEAR SCLEROTIC CATARACT OF BOTH EYES: ICD-10-CM

## 2024-07-25 DIAGNOSIS — Z01.818 PREOP EXAMINATION: Primary | ICD-10-CM

## 2024-07-25 PROCEDURE — 99212 OFFICE O/P EST SF 10 MIN: CPT | Mod: 95 | Performed by: PHYSICIAN ASSISTANT

## 2024-07-25 RX ORDER — SODIUM PHOSPHATE,MONO-DIBASIC 19G-7G/118
1 ENEMA (ML) RECTAL EVERY EVENING
COMMUNITY

## 2024-07-25 RX ORDER — VITAMIN E 268 MG
400 CAPSULE ORAL EVERY EVENING
COMMUNITY

## 2024-07-25 ASSESSMENT — PAIN SCALES - GENERAL: PAINLEVEL: NO PAIN (0)

## 2024-07-25 NOTE — H&P
Pre-Operative H & P     CC:  Preoperative exam to assess for increased cardiopulmonary risk while undergoing surgery and anesthesia.    Date of Encounter: 7/25/2024  Primary Care Physician:  No primary care provider on file.     Reason for visit:   Encounter Diagnoses   Name Primary?    Preop examination Yes    Nuclear sclerotic cataract of both eyes        HPI  Lidia Jenkins is a 78 year old female who presents for pre-operative H & P in preparation for  Procedure Information       Case: [id number] Date/Time: 07/29/24 1230    Procedure: RIGHT EYE PHACOEMULSIFICATION, CATARACT, WITH STANDARD INTRAOCULAR LENS IMPLANT INSERTION (Right: Eye)    Anesthesia type: MAC with Topical    Diagnosis: Nuclear sclerotic cataract of both eyes [H25.13]    Pre-op diagnosis: Nuclear sclerotic cataract of both eyes [H25.13]    Location: [place] [address] / [facility]    Providers: Whitney Edouard MD            Ms. Jenkins has a past medical history significant for hypertension and dyslipidemia.  She has been followed by ophthalmology for her bilateral cataracts and the above procedure is now planned.  Procedure for left eye is scheduled 8/12/2024.    History is obtained from the patient and chart review    Hx of abnormal bleeding or anti-platelet use: Denies    Menstrual history: No LMP recorded. Patient is postmenopausal.:      Past Medical History  Past Medical History:   Diagnosis Date    Arthritis     Basal cell carcinoma     Cataract     Hyperlipidaemia LDL goal < 100     Hyperlipidemia     Hypertension goal BP (blood pressure) < 140/90     Psoriasis        Past Surgical History  Past Surgical History:   Procedure Laterality Date    ABDOMEN SURGERY  1987    COLONOSCOPY  2012    RELEASE TRIGGER FINGER Left 8/15/2019    Procedure: Left Middle Finger Trigger Release;  Surgeon: Castro Navarro MD;  Location:  OR       Prior to Admission Medications  Current Outpatient  Medications   Medication Sig Dispense Refill    atenolol (TENORMIN) 25 MG tablet TAKE 1 TABLET BY MOUTH DAILY (Patient taking differently: Take 25 mg by mouth every evening) 90 tablet 3    ketoconazole (NIZORAL) 2 % external cream Apply topically daily Use as needed for itchy areas on the face and ears 60 g 4    lovastatin (MEVACOR) 40 MG tablet Take 1 tablet (40 mg) by mouth at bedtime 90 tablet 3    acyclovir (ZOVIRAX) 400 MG tablet Take 1 tablet (400 mg) by mouth 3 times daily 30 tablet 0    cholecalciferol (VITAMIN D) 1000 UNIT tablet Take 2 tablets (2,000 Units) by mouth daily 100 tablet 3    ketorolac tromethamine (ACULAR-LS) 0.4 % SOLN ophthalmic solution Operated eye: Start same day as surgery 1 drop four times a day for 1 week, then twice a day for 1 week, then once a day for 1 week, then stop. 5 mL 1    prednisoLONE acetate (PRED FORTE) 1 % ophthalmic suspension Operated eye: Start the same day of surgery 1 drop four times a day for 1 week, then twice a day for 1 week, then once a day for 1 week, then stop 5 mL 1       Allergies  No Known Allergies    Social History  Social History     Socioeconomic History    Marital status: Single     Spouse name: Not on file    Number of children: Not on file    Years of education: Not on file    Highest education level: Not on file   Occupational History    Not on file   Tobacco Use    Smoking status: Never    Smokeless tobacco: Never   Substance and Sexual Activity    Alcohol use: Yes     Alcohol/week: 4.0 standard drinks of alcohol     Comment: 1-4 glasses of wine a week    Drug use: No    Sexual activity: Not Currently   Other Topics Concern    Parent/sibling w/ CABG, MI or angioplasty before 65F 55M? Yes     Comment: father, brother   Social History Narrative    Not on file     Social Determinants of Health     Financial Resource Strain: Not on file   Food Insecurity: Not on file   Transportation Needs: Not on file   Physical Activity: Not on file   Stress: Not on  file   Social Connections: Not on file   Interpersonal Safety: Not on file   Housing Stability: Not on file       Family History  Family History   Problem Relation Age of Onset    Hypertension Mother     Hyperlipidemia Mother     Depression Mother     Coronary Artery Disease Father         MI    Hypertension Father     Hyperlipidemia Father     Substance Abuse Father     Heart Failure Father     Hypertension Sister     Hyperlipidemia Sister     Hypertension Sister     Hyperlipidemia Sister     Depression Sister     Substance Abuse Sister     Hypertension Brother     Heart Disease Brother     Hyperlipidemia Brother     Coronary Artery Disease Brother     Hypertension Brother     Substance Abuse Brother     Hyperlipidemia Brother     Other Cancer No family hx of     Melanoma No family hx of     Skin Cancer No family hx of     Macular Degeneration No family hx of     Glaucoma No family hx of     Anesthesia Reaction No family hx of     Venous thrombosis No family hx of        Review of Systems  The complete review of systems is negative other than noted in the HPI or here.   Anesthesia Evaluation   Pt has had prior anesthetic.     No history of anesthetic complications       ROS/MED HX  ENT/Pulmonary:     (+)     DWAYNE risk factors, snores loudly, hypertension,                                 Neurologic:  - neg neurologic ROS     Cardiovascular:     (+) Dyslipidemia hypertension- -   -  - -                                      METS/Exercise Tolerance: >4 METS    Hematologic:  - neg hematologic  ROS     Musculoskeletal:  - neg musculoskeletal ROS     GI/Hepatic:  - neg GI/hepatic ROS     Renal/Genitourinary:  - neg Renal ROS     Endo:  - neg endo ROS     Psychiatric/Substance Use:  - neg psychiatric ROS     Infectious Disease:  - neg infectious disease ROS     Malignancy: Comment: BCC  (+) Malignancy, History of Skin.Skin CA status post Surgery.      Other:  - neg other ROS          Virtual visit -  No vitals were  "obtained    Physical Exam  Constitutional: Awake, alert, cooperative, no apparent distress, and appears stated age.  HENT: Normocephalic  Respiratory: non labored breathing   Neurologic: Awake, alert, oriented to name, place and time.   Neuropsychiatric: Calm, cooperative. Normal affect.      Prior Labs/Diagnostic Studies   All labs and imaging personally reviewed     Labs not indicated    EKG/ stress test - if available please see in ROS above         The patient's records and results personally reviewed by this provider.     Outside records reviewed from: No outside records available      Assessment    Lidia Jenkins is a 78 year old female seen as a PAC referral for risk assessment and optimization for anesthesia.    Plan/Recommendations  Pt will be optimized for the proposed procedure.  See below for details on the assessment, risk, and preoperative recommendations    NEUROLOGY  - No history of TIA, CVA or seizure    -Post Op delirium risk factors:  Age    ENT  - No current airway concerns.  Will need to be reassessed day of surgery.  Mallampati: Unable to assess  TM: Unable to assess    CARDIAC  - Hypertension, continue atenolol  - Hyperlipidemia, continue lovastatin    - METS (Metabolic Equivalents)  Patient performs 4 or more METS exercise without symptoms             Total Score: 0      RCRI-Very low risk: Class 1 0.4% complication rate             Total Score: 0        PULMONARY    DWAYNE Medium Risk             Total Score: 3    DWAYNE: Snores loudly    DWAYNE: Hypertension    DWAYNE: Over 50 ys old      - Denies asthma or inhaler use  - Tobacco History    History   Smoking Status    Never   Smokeless Tobacco    Never       GI  - Denies GERD  PONV Medium Risk  Total Score: 2           1 AN PONV: Pt is Female    1 AN PONV: Patient is not a current smoker        ENDOCRINE    - BMI: Estimated body mass index is 28.47 kg/m  as calculated from the following:    Height as of 9/13/23: 1.651 m (5' 5\").    Weight as of " 9/13/23: 77.6 kg (171 lb 1.6 oz).  Overweight (BMI 25.0-29.9)  - No history of Diabetes Mellitus    HEME  VTE Low Risk 0.26%             Total Score: 1    VTE: Greater than 59 yrs old      - No history of abnormal bleeding or antiplatelet use.          Different anesthesia methods/types have been discussed with the patient, but they are aware that the final plan will be decided by the assigned anesthesia provider on the date of service.      The patient is optimized for their procedure. AVS with information on surgery time/arrival time, meds and NPO status given by nursing staff. No further diagnostic testing indicated.    Please refer to the physical examination documented by the anesthesiologist in the anesthesia record on the day of surgery.    Video-Visit Details    Type of service:  Video Visit    Provider received verbal consent for a Video Visit from the patient? Yes   Video Call Length: 7 minutes    Originating Location (pt. Location): Home    Distant Location (provider location):  Off-site  Mode of Communication:  Video Conference via [employer]  On the day of service:     Prep time: 3 minutes  Visit time: 7 minutes  Documentation time: 10 minutes  ------------------------------------------  Total time: 20 minutes      Melissa Mahan PA-C  Preoperative Assessment Center  [facility]  Clinic and Surgery Center  Phone: [phone]  Fax: [phone]

## 2024-07-25 NOTE — PROGRESS NOTES
Lidia is a 78 year old who is being evaluated via a billable video visit.    Wero      Subjective   Lidia is a 78 year old, presenting for the following health issues:  No chief complaint on file.          TELMA Moy LPN

## 2024-07-25 NOTE — PATIENT INSTRUCTIONS
Preparing for Your Surgery      Name:  Lidia Jenkins   MRN:  [id number]   :  1945   Today's Date:  2024         Arriving for surgery:  Surgery date:  24  Arrival time:  11:00 am  Surgery time: 12:30 pm    Restrictions due to COVID 19:    Please maintain social distance.  Masking is optional        parking is available for anyone with mobility limitations or disabilities. (Monday- Friday 7 am- 5 pm)    Please come to:    [facility]  [address] [place] [id number]    Check in on the 5th floor, Ambulatory Surgery Center.    What can I eat or drink?    -  You may eat and drink normally until 8 hours before arrival time  (Until 3:00 am)  -  You may have clear liquids until 2 hours before arrival time  (Until 9:00 am)    Examples of clear liquids:  Water  Clear broth  Juices (apple, white grape, white cranberry  and cider) without pulp  Noncarbonated, powder based beverages  (lemonade and Von-Aid)  Sodas (Sprite, 7-Up, ginger ale and seltzer)  Coffee or tea (without milk or cream)  Gatorade    --No alcohol or cannabis products for at least 24 hours before surgery    Which medicines can I take?    Hold Aspirin for 7 days before surgery.   Hold Multivitamins for 7 days before surgery.  Hold Supplements for 7 days before surgery.  Hold Ibuprofen (Advil, Motrin) for 1 day before surgery--unless otherwise directed by surgeon.  Hold Naproxen (Aleve) for 4 days before surgery.    -  DO NOT take the following medications the day of surgery:  Vitamin D  Topical medications    -  PLEASE TAKE the following medications per your usual routine:  Acyclovir (Zorivax)  Atenolol (Tenormin)  Lovastatin (Mevacor)    How do I prepare myself?  - Please take 2 showers (one the night prior to surgery and one the morning of surgery) using Scrubcare or Hibiclens soap.    Use this soap only from the neck to your toes. Do not use in genital area.     Leave the soap on your skin for one  minute--then rinse thoroughly.      You may use your own shampoo and conditioner; no other hair products.     Sleep in clean sheets and wear clean clothes.   - Please remove all jewelry and body piercings.  - No lotions, deodorants or fragrance.  - No makeup or fingernail polish.   - Bring your ID and insurance card.    -If you have a Deep Brain Stimulator, a Spinal Cord Stimulator or any implanted Neuro device you must bring the remote to the Surgery Center          ALL PATIENTS ARE REQUIRED TO HAVE A RESPONSIBLE ADULT TO DRIVE AND BE IN ATTENDANCE WITH THEM FOR 24 HOURS FOLLOWING SURGERY       Covid testing policy as of 12/06/2022  Your surgeon will notify and schedule you for a COVID test if one is needed before surgery--please direct any questions or COVID symptoms to your surgeon      Questions or Concerns:    -For questions regarding the day of surgery please contact the Ambulatory Surgery Center at [phone].    -If you have health changes between today and your surgery please contact your surgeon.     For questions after surgery please call your surgeons office.

## 2024-07-29 ENCOUNTER — ANESTHESIA (OUTPATIENT)
Dept: SURGERY | Facility: AMBULATORY SURGERY CENTER | Age: 79
End: 2024-07-29
Payer: COMMERCIAL

## 2024-07-29 ENCOUNTER — HOSPITAL ENCOUNTER (OUTPATIENT)
Facility: AMBULATORY SURGERY CENTER | Age: 79
Discharge: HOME | End: 2024-07-29
Attending: OPHTHALMOLOGY
Payer: COMMERCIAL

## 2024-07-29 VITALS
DIASTOLIC BLOOD PRESSURE: 84 MMHG | BODY MASS INDEX: 27.49 KG/M2 | WEIGHT: 165 LBS | TEMPERATURE: 97.4 F | HEART RATE: 56 BPM | HEIGHT: 65 IN | OXYGEN SATURATION: 98 % | SYSTOLIC BLOOD PRESSURE: 152 MMHG | RESPIRATION RATE: 16 BRPM

## 2024-07-29 DIAGNOSIS — Z96.1 PSEUDOPHAKIA: Primary | ICD-10-CM

## 2024-07-29 DIAGNOSIS — H25.13 NUCLEAR SCLEROTIC CATARACT OF BOTH EYES: Primary | ICD-10-CM

## 2024-07-29 PROCEDURE — 99100 ANES PT EXTEME AGE<1 YR&>70: CPT | Performed by: NURSE ANESTHETIST, CERTIFIED REGISTERED

## 2024-07-29 PROCEDURE — 66984 XCAPSL CTRC RMVL W/O ECP: CPT | Performed by: NURSE ANESTHETIST, CERTIFIED REGISTERED

## 2024-07-29 PROCEDURE — 66984 XCAPSL CTRC RMVL W/O ECP: CPT | Mod: RT | Performed by: OPHTHALMOLOGY

## 2024-07-29 PROCEDURE — 66984 XCAPSL CTRC RMVL W/O ECP: CPT | Mod: RT

## 2024-07-29 PROCEDURE — 66984 XCAPSL CTRC RMVL W/O ECP: CPT | Performed by: ANESTHESIOLOGY

## 2024-07-29 PROCEDURE — 99100 ANES PT EXTEME AGE<1 YR&>70: CPT | Performed by: ANESTHESIOLOGY

## 2024-07-29 DEVICE — LENS CC60WF 23.5 CLAREON UV ASPHERIC BICONVEX IOL: Type: IMPLANTABLE DEVICE | Site: EYE | Status: FUNCTIONAL

## 2024-07-29 RX ORDER — NALOXONE HYDROCHLORIDE 0.4 MG/ML
0.1 INJECTION, SOLUTION INTRAMUSCULAR; INTRAVENOUS; SUBCUTANEOUS
Status: DISCONTINUED | OUTPATIENT
Start: 2024-07-29 | End: 2024-07-30 | Stop reason: HOSPADM

## 2024-07-29 RX ORDER — CYCLOPENTOLAT/TROPIC/PHENYLEPH 1%-1%-2.5%
1 DROPS (EA) OPHTHALMIC (EYE)
Status: COMPLETED | OUTPATIENT
Start: 2024-07-29 | End: 2024-07-29

## 2024-07-29 RX ORDER — PROPARACAINE HYDROCHLORIDE 5 MG/ML
1 SOLUTION/ DROPS OPHTHALMIC ONCE
Status: COMPLETED | OUTPATIENT
Start: 2024-07-29 | End: 2024-07-29

## 2024-07-29 RX ORDER — SODIUM CHLORIDE, SODIUM LACTATE, POTASSIUM CHLORIDE, CALCIUM CHLORIDE 600; 310; 30; 20 MG/100ML; MG/100ML; MG/100ML; MG/100ML
INJECTION, SOLUTION INTRAVENOUS CONTINUOUS
Status: DISCONTINUED | OUTPATIENT
Start: 2024-07-29 | End: 2024-07-30 | Stop reason: HOSPADM

## 2024-07-29 RX ORDER — SODIUM CHLORIDE, SODIUM LACTATE, POTASSIUM CHLORIDE, CALCIUM CHLORIDE 600; 310; 30; 20 MG/100ML; MG/100ML; MG/100ML; MG/100ML
INJECTION, SOLUTION INTRAVENOUS CONTINUOUS
Status: DISCONTINUED | OUTPATIENT
Start: 2024-07-29 | End: 2024-07-29 | Stop reason: HOSPADM

## 2024-07-29 RX ORDER — FENTANYL CITRATE 50 UG/ML
25 INJECTION, SOLUTION INTRAMUSCULAR; INTRAVENOUS
Status: DISCONTINUED | OUTPATIENT
Start: 2024-07-29 | End: 2024-07-30 | Stop reason: HOSPADM

## 2024-07-29 RX ORDER — MOXIFLOXACIN 5 MG/ML
1 SOLUTION/ DROPS OPHTHALMIC
Status: COMPLETED | OUTPATIENT
Start: 2024-07-29 | End: 2024-07-29

## 2024-07-29 RX ORDER — OXYCODONE HYDROCHLORIDE 5 MG/1
5 TABLET ORAL
Status: DISCONTINUED | OUTPATIENT
Start: 2024-07-29 | End: 2024-07-30 | Stop reason: HOSPADM

## 2024-07-29 RX ORDER — PREDNISOLONE ACETATE 1 %
SUSPENSION, DROPS(FINAL DOSAGE FORM)(ML) OPHTHALMIC (EYE) PRN
Status: DISCONTINUED | OUTPATIENT
Start: 2024-07-29 | End: 2024-07-29 | Stop reason: HOSPADM

## 2024-07-29 RX ORDER — PREDNISOLONE ACETATE 10 MG/ML
SUSPENSION/ DROPS OPHTHALMIC
Qty: 5 ML | Refills: 1 | Status: SHIPPED | OUTPATIENT
Start: 2024-07-29 | End: 2024-08-29

## 2024-07-29 RX ORDER — OXYCODONE HYDROCHLORIDE 5 MG/1
10 TABLET ORAL
Status: DISCONTINUED | OUTPATIENT
Start: 2024-07-29 | End: 2024-07-30 | Stop reason: HOSPADM

## 2024-07-29 RX ORDER — BALANCED SALT SOLUTION 6.4; .75; .48; .3; 3.9; 1.7 MG/ML; MG/ML; MG/ML; MG/ML; MG/ML; MG/ML
SOLUTION OPHTHALMIC PRN
Status: DISCONTINUED | OUTPATIENT
Start: 2024-07-29 | End: 2024-07-29 | Stop reason: HOSPADM

## 2024-07-29 RX ORDER — ONDANSETRON 2 MG/ML
4 INJECTION INTRAMUSCULAR; INTRAVENOUS EVERY 30 MIN PRN
Status: DISCONTINUED | OUTPATIENT
Start: 2024-07-29 | End: 2024-07-30 | Stop reason: HOSPADM

## 2024-07-29 RX ORDER — KETOROLAC TROMETHAMINE 4 MG/ML
SOLUTION/ DROPS OPHTHALMIC
Qty: 5 ML | Refills: 1 | Status: SHIPPED | OUTPATIENT
Start: 2024-07-29 | End: 2024-08-29

## 2024-07-29 RX ORDER — FENTANYL CITRATE 50 UG/ML
INJECTION, SOLUTION INTRAMUSCULAR; INTRAVENOUS PRN
Status: DISCONTINUED | OUTPATIENT
Start: 2024-07-29 | End: 2024-07-29

## 2024-07-29 RX ORDER — TETRACAINE HYDROCHLORIDE 5 MG/ML
SOLUTION OPHTHALMIC PRN
Status: DISCONTINUED | OUTPATIENT
Start: 2024-07-29 | End: 2024-07-29 | Stop reason: HOSPADM

## 2024-07-29 RX ORDER — LIDOCAINE HYDROCHLORIDE 10 MG/ML
INJECTION, SOLUTION EPIDURAL; INFILTRATION; INTRACAUDAL; PERINEURAL PRN
Status: DISCONTINUED | OUTPATIENT
Start: 2024-07-29 | End: 2024-07-29 | Stop reason: HOSPADM

## 2024-07-29 RX ORDER — ACETAMINOPHEN 325 MG/1
975 TABLET ORAL ONCE
Status: COMPLETED | OUTPATIENT
Start: 2024-07-29 | End: 2024-07-29

## 2024-07-29 RX ORDER — MOXIFLOXACIN IN NACL,ISO-OS/PF 0.3MG/0.3
SYRINGE (ML) INTRAOCULAR PRN
Status: DISCONTINUED | OUTPATIENT
Start: 2024-07-29 | End: 2024-07-29 | Stop reason: HOSPADM

## 2024-07-29 RX ORDER — LIDOCAINE 40 MG/G
CREAM TOPICAL
Status: DISCONTINUED | OUTPATIENT
Start: 2024-07-29 | End: 2024-07-29 | Stop reason: HOSPADM

## 2024-07-29 RX ORDER — DEXAMETHASONE SODIUM PHOSPHATE 10 MG/ML
4 INJECTION, SOLUTION INTRAMUSCULAR; INTRAVENOUS
Status: DISCONTINUED | OUTPATIENT
Start: 2024-07-29 | End: 2024-07-30 | Stop reason: HOSPADM

## 2024-07-29 RX ORDER — ONDANSETRON 4 MG/1
4 TABLET, ORALLY DISINTEGRATING ORAL EVERY 30 MIN PRN
Status: DISCONTINUED | OUTPATIENT
Start: 2024-07-29 | End: 2024-07-30 | Stop reason: HOSPADM

## 2024-07-29 RX ADMIN — Medication 1 DROP: at 11:33

## 2024-07-29 RX ADMIN — Medication 1 DROP: at 11:37

## 2024-07-29 RX ADMIN — SODIUM CHLORIDE, SODIUM LACTATE, POTASSIUM CHLORIDE, CALCIUM CHLORIDE: 600; 310; 30; 20 INJECTION, SOLUTION INTRAVENOUS at 12:44

## 2024-07-29 RX ADMIN — PROPARACAINE HYDROCHLORIDE 1 DROP: 5 SOLUTION/ DROPS OPHTHALMIC at 11:30

## 2024-07-29 RX ADMIN — MOXIFLOXACIN 1 DROP: 5 SOLUTION/ DROPS OPHTHALMIC at 11:37

## 2024-07-29 RX ADMIN — ACETAMINOPHEN 975 MG: 325 TABLET ORAL at 11:49

## 2024-07-29 RX ADMIN — FENTANYL CITRATE 50 MCG: 50 INJECTION, SOLUTION INTRAMUSCULAR; INTRAVENOUS at 12:50

## 2024-07-29 RX ADMIN — MOXIFLOXACIN 1 DROP: 5 SOLUTION/ DROPS OPHTHALMIC at 11:32

## 2024-07-29 RX ADMIN — Medication 1 DROP: at 11:45

## 2024-07-29 RX ADMIN — MOXIFLOXACIN 1 DROP: 5 SOLUTION/ DROPS OPHTHALMIC at 11:45

## 2024-07-29 NOTE — DISCHARGE INSTRUCTIONS
[facility]  Home Care Following Anesthesia  For 24 hours after surgery:  Get plenty of rest.  A responsible adult must stay with you for at least 24 hours after you leave the surgery center.  Do not drive or use heavy equipment.  If you have weakness or tingling, don't drive or use heavy equipment until this feeling goes away.   Do not drink alcohol.   Avoid strenuous or risky activities.  Ask for help when climbing stairs.  You may feel lightheaded.  IF so, sit for a few minutes before standing.  Have someone help you get up.   If you have nausea (feel sick to your stomach): Drink only clear liquids such as apple juice, ginger ale, broth or 7-Up.  Rest may also help.  Be sure to drink enough fluids.  Move to a regular diet as you feel able.   You may have a slight fever.  Call the doctor if your fever is over 100 F (37.7 C) (taken under the tongue) or lasts longer than 24 hours.  You may have a dry mouth, a sore throat, muscle aches or trouble sleeping. These should go away after 24 hours.  Do not make important or legal decisions.   It is recommended to avoid smoking.               Tips for taking pain medications  To get the best pain relief possible, remember these points:  Take pain medications as directed, before pain becomes severe.  Pain medication can upset your stomach: taking it with food may help.  Constipation is a common side effect of pain medication. Drink plenty of  fluids.  Eat foods high in fiber. Take a stool softener if recommended by your doctor or pharmacist.  Do not drink alcohol, drive or operate machinery while taking pain medications.  Ask about other ways to control pain, such as with heat, ice or relaxation.    Tylenol/Acetaminophen Consumption    If you feel your pain relief is insufficient, you may take Tylenol/Acetaminophen in addition to your narcotic pain medication.   Be careful not to exceed 4,000 mg of Tylenol/Acetaminophen in a 24 hour  period from all sources.  If you are taking extra strength Tylenol/acetaminophen (500 mg), the maximum dose is 8 tablets in 24 hours.  If you are taking regular strength acetaminophen (325 mg), the maximum dose is 12 tablets in 24 hours.    Tylenol 975 mg given at 11:50 AM. OK to take more after 5:50 PM. Follow package instructions.       Call a doctor for any of the following:  Signs of infection (fever, growing tenderness at the surgery site, a large amount of drainage or bleeding, severe pain, foul-smelling drainage, redness, swelling).  It has been over 8 to 10 hours since surgery and you are still not able to urinate (pass water).  Headache for over 24 hours.  Signs of Covid-19 infection (temperature over 100 degrees, shortness of breath, cough, loss of taste/smell, generalized body aches, persistent headache, chills, sore throat, nausea/vomiting/diarrhea)  Your doctor is:  Dr. Whitney Edouard, Ophthalmology: [phone]  Or dial [phone] and ask for the resident on call for:  Ophthalmology  For emergency care, call the:  [facility]:  [phone] (TTY for hearing impaired: [phone])

## 2024-07-29 NOTE — ANESTHESIA CARE TRANSFER NOTE
Patient: Lidia Jenkins    Procedure: Procedure(s):  RIGHT EYE PHACOEMULSIFICATION, CATARACT, WITH STANDARD INTRAOCULAR LENS IMPLANT INSERTION       Diagnosis: Nuclear sclerotic cataract of both eyes [H25.13]  Diagnosis Additional Information: No value filed.    Anesthesia Type:   MAC     Note:    Oropharynx: oropharynx clear of all foreign objects and spontaneously breathing  Level of Consciousness: awake  Oxygen Supplementation: room air    Independent Airway: airway patency satisfactory and stable  Dentition: dentition unchanged  Vital Signs Stable: post-procedure vital signs reviewed and stable  Report to RN Given: handoff report given  Patient transferred to: Phase II    Handoff Report: Identifed the Patient, Identified the Reponsible Provider, Reviewed the pertinent medical history, Discussed the surgical course, Reviewed Intra-OP anesthesia mangement and issues during anesthesia, Set expectations for post-procedure period and Allowed opportunity for questions and acknowledgement of understanding      Vitals:  Vitals Value Taken Time   /84 07/29/24 1330   Temp 36.2  C (97.2  F) 07/29/24 1330   Pulse 51 07/29/24 1330   Resp 16 07/29/24 1330   SpO2 97 % 07/29/24 1330       Electronically Signed By: TIFFANY Cristina CRNA  July 29, 2024  1:34 PM

## 2024-07-29 NOTE — OP NOTE
Pre-operative Diagnosis: Visually significant cataract, Right eye    Post-operative Diagnosis:  Pseudophakia, Right     Operative Procedure:  Phacoemulsification with intraocular lens implantation, Right eye     Surgeon(s):  Whitney Edouard MD    Anesthesia:   Topical/MAC  Findings:  No unusual findings   Blood Loss:    Minimal  Implants:   Implant Name Type Inv. Item Serial No.  Lot No. LRB No. Used Action   LENS [id number] 23.5 [employer] UV ASPHERIC BICONVEX IOL - [id number] Lens/Eye Implant LENS [id number] 23.5 [employer] UV ASPHERIC BICONVEX IOL [id number] [facility]  Right 1 Implanted           Specimens:  None      Complications:  none   Condition:  stable    Indications: The patient Lidia Jenkins presented to the eye clinic with decreased vision secondary to cataract in the Right eye. The risks, benefits and alternatives to cataract extraction were discussed. The patient elected to proceed. All questions were answered to the patient's satisfaction.    Description of Procedure:   Prior to the procedure, appropriate cardiac and respiratory monitors were applied to the patient. The patient was brought to the operating room where a drop of topical tetracaine was given followed by povidone iodine.  With adequate anesthesia, the Right eye was prepped and draped in the usual sterile fashion.  A surgical pause was carried out to identify with all members of the surgical team the correct surgical site.   A lid speculum was placed, and the operating microscope was rotated into position.  A paracentesis was created to the left of the planned temporal incision.  Through this limbal paracentesis, the anterior chamber was filled with preservative-free lidocaine followed by dispersive viscoelastic.  A temporal wound was created at the limbus using a 2.5 mm keratome blade.  A capsulorrhexis was initiated using a bent 25-gauge needle and was completed in continuous and circular fashion using the capsulorrhexis  forceps. The lens nucleus was hydrodissected using balanced salt solution, there was a small amount of iris prolapse which resolved with downward pressure on the nucleus with the canula.  The lens nucleus was rotated and removed using phacoemulsification.  Residual cortical material was removed using irrigation-aspiration.  The capsular bag was reinflated to its maximal extent with cohesive viscoelastic.  An intraocular lens implant of the above listed power was inserted into the capsular bag.  The lens power was reviewed using the preoperative intraocular lens power measurements to confirm that the correct lens was used for the desired post-operative refractive state.  The residual viscoelastic was removed in its entirety, the wounds were hydrated and found to be self-sealing.  A dose of 0.1mg of intracameral moxifloxacin was administered through the paracentesis.  Tactile pressure was confirmed to be in a normal range.  The lid speculum was removed and a drop of prednisolone acetate 1% and ketorolac were given before a shield was applied.  The patient tolerated the procedure well, and there were no complications.    Plan: The patient will be discharged to home and will follow up tomorrow morning in the eye clinic.

## 2024-07-29 NOTE — ANESTHESIA POSTPROCEDURE EVALUATION
Patient: Lidia Jenkins    Procedure: Procedure(s):  RIGHT EYE PHACOEMULSIFICATION, CATARACT, WITH STANDARD INTRAOCULAR LENS IMPLANT INSERTION       Anesthesia Type:  MAC    Note:  Disposition: Outpatient   Postop Pain Control: Uneventful            Sign Out: Well controlled pain   PONV: No   Neuro/Psych: Uneventful            Sign Out: Acceptable/Baseline neuro status   Airway/Respiratory: Uneventful            Sign Out: Acceptable/Baseline resp. status   CV/Hemodynamics: Uneventful            Sign Out: Acceptable CV status; No obvious hypovolemia; No obvious fluid overload   Other NRE: NONE   DID A NON-ROUTINE EVENT OCCUR? No       Last vitals:  Vitals Value Taken Time   /84 07/29/24 1345   Temp 36.3  C (97.4  F) 07/29/24 1345   Pulse 56 07/29/24 1345   Resp 16 07/29/24 1345   SpO2 98 % 07/29/24 1345       Electronically Signed By: Karen Myao MD  July 29, 2024  1:51 PM

## 2024-07-29 NOTE — ANESTHESIA PREPROCEDURE EVALUATION
Anesthesia Pre-Procedure Evaluation    Patient: Lidia Jenkins   MRN: [id number] : 1945        Procedure : Procedure(s):  RIGHT EYE PHACOEMULSIFICATION, CATARACT, WITH STANDARD INTRAOCULAR LENS IMPLANT INSERTION          Past Medical History:   Diagnosis Date     Arthritis      Basal cell carcinoma      Cataract      Hyperlipidaemia LDL goal < 100      Hyperlipidemia      Hypertension goal BP (blood pressure) < 140/90      Psoriasis       Past Surgical History:   Procedure Laterality Date     ABDOMEN SURGERY  1987     COLONOSCOPY  2012     RELEASE TRIGGER FINGER Left 8/15/2019    Procedure: Left Middle Finger Trigger Release;  Surgeon: Castro Navarro MD;  Location: [facility] OR      No Known Allergies   Social History     Tobacco Use     Smoking status: Never     Smokeless tobacco: Never   Substance Use Topics     Alcohol use: Yes     Alcohol/week: 3.0 standard drinks of alcohol     Types: 3 Standard drinks or equivalent per week     Comment: 1-3 glasses of wine a week      Wt Readings from Last 1 Encounters:   24 74.8 kg (165 lb)        Anesthesia Evaluation   Pt has had prior anesthetic.         ROS/MED HX  ENT/Pulmonary:  - neg pulmonary ROS     Neurologic:  - neg neurologic ROS     Cardiovascular:     (+) Dyslipidemia hypertension- -   -  - -                                      METS/Exercise Tolerance:     Hematologic:  - neg hematologic  ROS     Musculoskeletal:  - neg musculoskeletal ROS     GI/Hepatic:  - neg GI/hepatic ROS     Renal/Genitourinary:  - neg Renal ROS     Endo:  - neg endo ROS     Psychiatric/Substance Use:  - neg psychiatric ROS     Infectious Disease:  - neg infectious disease ROS     Malignancy:   (+) Malignancy, History of Skin.    Other: Comment: cataract           Physical Exam    Airway        Mallampati: II   TM distance: > 3 FB   Neck ROM: full   Mouth opening: > 3 cm    Respiratory Devices and Support         Dental       (+) Modest Abnormalities - crowns, retainers, 1  "or 2 missing teeth      Cardiovascular   cardiovascular exam normal       Rhythm and rate: regular     Pulmonary   pulmonary exam normal        breath sounds clear to auscultation       OUTSIDE LABS:  CBC:   Lab Results   Component Value Date    WBC 5.8 09/13/2023    WBC 4.9 06/15/2021    HGB 12.9 09/13/2023    HGB 13.7 08/27/2022    HCT 39.0 09/13/2023    HCT 40.5 06/15/2021     09/13/2023     06/15/2021     BMP:   Lab Results   Component Value Date     09/13/2023     08/27/2022    POTASSIUM 4.8 09/13/2023    POTASSIUM 4.3 08/27/2022    CHLORIDE 106 09/13/2023    CHLORIDE 110 (H) 08/27/2022    CO2 25 09/13/2023    CO2 27 08/27/2022    BUN 26.0 (H) 09/13/2023    BUN 19 08/27/2022    CR 0.84 09/13/2023    CR 0.75 08/27/2022    GLC 92 09/13/2023    GLC 98 08/27/2022     COAGS: No results found for: \"PTT\", \"INR\", \"FIBR\"  POC: No results found for: \"BGM\", \"HCG\", \"HCGS\"  HEPATIC:   Lab Results   Component Value Date    ALBUMIN 4.6 09/13/2023    PROTTOTAL 7.2 09/13/2023    ALT 18 09/13/2023    AST 27 09/13/2023    ALKPHOS 52 09/13/2023    BILITOTAL 0.3 09/13/2023     OTHER:   Lab Results   Component Value Date    TAMIKO 9.9 09/13/2023    MAG 2.7 (H) 09/13/2023    TSH 3.62 09/13/2023       Anesthesia Plan    ASA Status:  3    NPO Status:  NPO Appropriate    Anesthesia Type: MAC.     - Reason for MAC: straight local not clinically adequate              Consents    Anesthesia Plan(s) and associated risks, benefits, and realistic alternatives discussed. Questions answered and patient/representative(s) expressed understanding.     - Discussed:     - Discussed with:  Patient      - Extended Intubation/Ventilatory Support Discussed: No.      - Patient is DNR/DNI Status: No     Use of blood products discussed: No .     Postoperative Care    Pain management: IV analgesics, Oral pain medications.   PONV prophylaxis: Ondansetron (or other 5HT-3)     Comments:               Karen Mayo MD    I have " "reviewed the pertinent notes and labs in the chart from the past 30 days and (re)examined the patient.  Any updates or changes from those notes are reflected in this note.              # Overweight: Estimated body mass index is 27.46 kg/m  as calculated from the following:    Height as of 7/25/24: 1.651 m (5' 5\").    Weight as of 7/25/24: 74.8 kg (165 lb).      "

## 2024-07-30 ENCOUNTER — OFFICE VISIT (OUTPATIENT)
Dept: OPHTHALMOLOGY | Facility: CLINIC | Age: 79
End: 2024-07-30
Payer: COMMERCIAL

## 2024-07-30 DIAGNOSIS — H35.379 EPIRETINAL MEMBRANE, UNSPECIFIED LATERALITY: ICD-10-CM

## 2024-07-30 DIAGNOSIS — Z96.1 PSEUDOPHAKIA OF RIGHT EYE: Primary | ICD-10-CM

## 2024-07-30 PROCEDURE — 99024 POSTOP FOLLOW-UP VISIT: CPT | Performed by: OPHTHALMOLOGY

## 2024-07-30 ASSESSMENT — VISUAL ACUITY
OD_SC: 20/50+2
OS_CC: 20/25
METHOD: SNELLEN - LINEAR
CORRECTION_TYPE: GLASSES

## 2024-07-30 ASSESSMENT — EXTERNAL EXAM - LEFT EYE: OS_EXAM: NORMAL

## 2024-07-30 ASSESSMENT — SLIT LAMP EXAM - LIDS
COMMENTS: NORMAL
COMMENTS: NORMAL

## 2024-07-30 ASSESSMENT — TONOMETRY
OD_IOP_MMHG: 13
IOP_METHOD: TONOPEN

## 2024-07-30 ASSESSMENT — REFRACTION_MANIFEST
OD_SPHERE: +0.25
OD_AXIS: 030
OD_CYLINDER: +0.50

## 2024-07-30 ASSESSMENT — EXTERNAL EXAM - RIGHT EYE: OD_EXAM: NORMAL

## 2024-07-30 ASSESSMENT — CUP TO DISC RATIO: OD_RATIO: 0.25

## 2024-07-30 NOTE — PROGRESS NOTES
Post op day #1, status post cataract surgery, right eye  7/29/24      HPI:  Lidia Jenkins is here for above.   Slight foreign body sensation in the right eye. Using drops.  Vision still fuzzy, not distorted right eye. No pain.   HPI       Post Op (Ophthalmology) Right Eye    In right eye.             Comments    1 day s/p right eye phaco/IOL (plano goal)   She states she did well last night, no pain/headache/nausea  She is using prednisolone and ketorolac QID right eye     She states her vision hasn't improved at all and she's very worried about it     DOMINGA Tran 2:32 PM 07/30/2024              Last edited by Vero Carranza on 7/30/2024  2:33 PM.            Assessment/Plan:  (Z96.1) Pseudophakia of right eye   Epiretinal membrane right eye   Comment:  Postoperative day #1, good post-operative appearance.  Wounds water-tight and IOP reasonable.  Visual acuity may be limited by epiretinal membrane, patient will see if any improvement this week if not she will schedule for retinal evaluation.     Plan:   Operative eye:    Prednisolone (white cap, milky drop) 4 times daily   Ketorolac (gray cap) 4 times daily  Patient received intracameral Moxifloxacin in surgery      Instructions for patient reviewed:  Eye protection at all times and eye shield at night for 1 week.  Limited activities with no exercise or heavy lifting for 1 week.  Instructed patient to contact immediately for decreasing vision, eye pain, increased redness, new floaters or flashes of light, or other concerning symptoms.      Followup:  Return in about 1 week (around 8/6/2024) for post-op cataract sx, POW  1  OD,  , OCT macula OU.        Complete documentation of historical and exam elements from today's encounter can be found in the full encounter summary report (not reduplicated in this progress note). I personally obtained the chief complaint(s) and history of present illness.  I have confirmed and edited as necessary the CC, HPI,  PMH/PSH, social history, FMH, ROS, and exam/neuro findings as obtained by the technician or others. I have examined this patient myself and I personally viewed the image(s) and studies listed above and the documentation reflects my findings and interpretation.  I formulated and edited as necessary the assessment and plan and discussed the findings and management plan with the patient and family.     Whitney Edouard MD

## 2024-08-05 ENCOUNTER — OFFICE VISIT (OUTPATIENT)
Dept: OPHTHALMOLOGY | Facility: CLINIC | Age: 79
End: 2024-08-05
Payer: COMMERCIAL

## 2024-08-05 DIAGNOSIS — Z96.1 PSEUDOPHAKIA OF RIGHT EYE: Primary | ICD-10-CM

## 2024-08-05 DIAGNOSIS — H35.371 EPIRETINAL MEMBRANE (ERM) OF RIGHT EYE: ICD-10-CM

## 2024-08-05 PROCEDURE — 92134 CPTRZ OPH DX IMG PST SGM RTA: CPT | Performed by: OPHTHALMOLOGY

## 2024-08-05 PROCEDURE — 99024 POSTOP FOLLOW-UP VISIT: CPT | Performed by: OPHTHALMOLOGY

## 2024-08-05 ASSESSMENT — EXTERNAL EXAM - RIGHT EYE: OD_EXAM: NORMAL

## 2024-08-05 ASSESSMENT — VISUAL ACUITY
OD_CC: 20/400
OD_SC: 20/70
METHOD: SNELLEN - LINEAR
OS_SC: 20/25
OS_SC+: -1

## 2024-08-05 ASSESSMENT — CUP TO DISC RATIO: OD_RATIO: 0.25

## 2024-08-05 ASSESSMENT — SLIT LAMP EXAM - LIDS
COMMENTS: NORMAL
COMMENTS: NORMAL

## 2024-08-05 ASSESSMENT — EXTERNAL EXAM - LEFT EYE: OS_EXAM: NORMAL

## 2024-08-05 ASSESSMENT — TONOMETRY
IOP_METHOD: TONOPEN
OD_IOP_MMHG: 14
OS_IOP_MMHG: 17

## 2024-08-05 ASSESSMENT — REFRACTION_MANIFEST
OD_CYLINDER: SPHERE
OD_SPHERE: +0.25

## 2024-08-05 NOTE — PROGRESS NOTES
Post op week #1, status post cataract surgery, right eye  7/29/24  Epiretinal membrane right eye       HPI:  Lidia Jenkins is here for above.     HPI       Post Op (Ophthalmology) Right Eye    Associated symptoms include itching (Mild, as in healing) and foreign body sensation (continuous).  Negative for dryness and eye pain.  Pain was noted as 0/10.             Comments    S/P phacoemulsification, cataract, with intraocular lens insertion, standard, right eye (7/29/2024). Patient is discouraged as right eye seems as blurry now after surgery as before surgery. Still blurry right eye with or without correction. Some eyestrain after reading. Taking Prednisolone BID right eye and Ketorolac BID right eye. Did get both in this morning.     Eva Bazzi on 8/5/2024 at 8:39 AM            Last edited by Eva Bazzi on 8/5/2024  8:39 AM.            Assessment/Plan:  (Z96.1) Pseudophakia of right eye   Epiretinal membrane right eye   Comment:  good post-operative appearance.  Wounds water-tight and IOP reasonable.  Minimal to no residual refractive error, visual acuity limited by epiretinal membrane.  Patient will schedule for retinal evaluation.  Understands more surgery will be needed to improve vision further.   Plan:   Operative eye:  Prednisolone (white cap, milky drop) 4 times daily - taper by one drop per week  Ketorolac (gray cap) 4 times daily    Discontinue shield and resume normal activities.      Followup:  Return in about 1 week (around 8/12/2024) for next available ERM OD Dr. Kerr.        Complete documentation of historical and exam elements from today's encounter can be found in the full encounter summary report (not reduplicated in this progress note). I personally obtained the chief complaint(s) and history of present illness.  I have confirmed and edited as necessary the CC, HPI, PMH/PSH, social history, FMH, ROS, and exam/neuro findings as obtained by the technician or others. I have  examined this patient myself and I personally viewed the image(s) and studies listed above and the documentation reflects my findings and interpretation.  I formulated and edited as necessary the assessment and plan and discussed the findings and management plan with the patient and family.     Whitney Edouard MD

## 2024-08-05 NOTE — NURSING NOTE
Chief Complaints and History of Present Illnesses   Patient presents with    Post Op (Ophthalmology) Right Eye     Chief Complaint(s) and History of Present Illness(es)       Post Op (Ophthalmology) Right Eye              Associated symptoms: itching (Mild, as in healing) and foreign body sensation (continuous).  Negative for dryness and eye pain    Pain scale: 0/10              Comments    S/P phacoemulsification, cataract, with intraocular lens insertion, standard, right eye (7/29/2024). Patient is discouraged as right eye seems as blurry now after surgery as before surgery. Still blurry right eye with or without correction. Some eyestrain after reading. Taking Prednisolone BID right eye and Ketorolac BID right eye. Did get both in this morning.     Eva Bazzi on 8/5/2024 at 8:39 AM

## 2024-08-08 ENCOUNTER — TELEPHONE (OUTPATIENT)
Dept: OPHTHALMOLOGY | Facility: CLINIC | Age: 79
End: 2024-08-08
Payer: COMMERCIAL

## 2024-08-08 NOTE — TELEPHONE ENCOUNTER
Spoke with patient regarding trying to explain again about the Billing question for [facility] Fees. Sent patient an appointment letter with Information on page 2 of appointment letter regarding costs for appointment.-Per Patient

## 2024-08-20 ENCOUNTER — TELEPHONE (OUTPATIENT)
Dept: OPHTHALMOLOGY | Facility: CLINIC | Age: 79
End: 2024-08-20
Payer: COMMERCIAL

## 2024-08-20 ENCOUNTER — MYC MEDICAL ADVICE (OUTPATIENT)
Dept: OPHTHALMOLOGY | Facility: CLINIC | Age: 79
End: 2024-08-20
Payer: COMMERCIAL

## 2024-08-20 NOTE — TELEPHONE ENCOUNTER
LVM for patient of scheduled appointment offered at 8:45am with . Will reach out to patient again later today.

## 2024-08-20 NOTE — TELEPHONE ENCOUNTER
"Per Abida message there is availability tomorrow morning:    \"Ann had a cancel for tomorrow at 8:45. Please call and schedule there otherwise its 9/11\"    If still avail, please offer to pt    Thank you,   K  "

## 2024-08-21 ENCOUNTER — OFFICE VISIT (OUTPATIENT)
Dept: OPHTHALMOLOGY | Facility: CLINIC | Age: 79
End: 2024-08-21
Attending: OPHTHALMOLOGY
Payer: COMMERCIAL

## 2024-08-21 ENCOUNTER — TELEPHONE (OUTPATIENT)
Dept: OPHTHALMOLOGY | Facility: CLINIC | Age: 79
End: 2024-08-21
Payer: COMMERCIAL

## 2024-08-21 DIAGNOSIS — H35.371 EPIRETINAL MEMBRANE (ERM) OF RIGHT EYE: Primary | ICD-10-CM

## 2024-08-21 DIAGNOSIS — H04.123 DRY EYES, BILATERAL: ICD-10-CM

## 2024-08-21 DIAGNOSIS — Z96.1 PSEUDOPHAKIA OF RIGHT EYE: ICD-10-CM

## 2024-08-21 PROCEDURE — 99214 OFFICE O/P EST MOD 30 MIN: CPT | Mod: 24 | Performed by: OPHTHALMOLOGY

## 2024-08-21 PROCEDURE — 92134 CPTRZ OPH DX IMG PST SGM RTA: CPT | Performed by: OPHTHALMOLOGY

## 2024-08-21 PROCEDURE — 99207 FUNDUS PHOTOS OU (BOTH EYES): CPT | Mod: 26 | Performed by: OPHTHALMOLOGY

## 2024-08-21 PROCEDURE — 92250 FUNDUS PHOTOGRAPHY W/I&R: CPT | Mod: 59 | Performed by: OPHTHALMOLOGY

## 2024-08-21 PROCEDURE — G0463 HOSPITAL OUTPT CLINIC VISIT: HCPCS | Performed by: OPHTHALMOLOGY

## 2024-08-21 ASSESSMENT — TONOMETRY
OD_IOP_MMHG: 6
IOP_METHOD: TONOPEN
OS_IOP_MMHG: 6

## 2024-08-21 ASSESSMENT — SLIT LAMP EXAM - LIDS
COMMENTS: NORMAL
COMMENTS: NORMAL

## 2024-08-21 ASSESSMENT — VISUAL ACUITY
OD_SC: 20/80
OS_CC: 20/30
OD_SC+: -1
METHOD: SNELLEN - LINEAR
CORRECTION_TYPE: GLASSES
OS_CC+: +2

## 2024-08-21 ASSESSMENT — EXTERNAL EXAM - RIGHT EYE: OD_EXAM: NORMAL

## 2024-08-21 ASSESSMENT — EXTERNAL EXAM - LEFT EYE: OS_EXAM: NORMAL

## 2024-08-21 ASSESSMENT — CUP TO DISC RATIO: OD_RATIO: 0.25

## 2024-08-21 NOTE — NURSING NOTE
"Chief Complaints and History of Present Illnesses   Patient presents with    Epiretinal Membrane Evaluation     Referral from Dr. Edouard for evaluation of ERM right eye. Hx CE/IOL right eye 7/29/24.     Patient notes vision is blurry with right eye since surgery about 3 weeks ago. Patient notes her vision is horrible. \"I can't see up close. Everything is blurry.\" Denies distortion. Patient states she finished the surgical drops as prescribed. \"I finished on Sunday.\"     DOMINGA John 8:33 AM 08/21/2024       Chief Complaint(s) and History of Present Illness(es)       Epiretinal Membrane Evaluation              Laterality: right eye    Onset: weeks ago    Quality: blurred vision    Associated symptoms: itching.  Negative for eye pain, flashes and floaters    Pain scale: 0/10    Comments: Referral from Dr. Edouard for evaluation of ERM right eye. Hx CE/IOL right eye 7/29/24.     Patient notes vision is blurry with right eye since surgery about 3 weeks ago. Patient notes her vision is horrible. \"I can't see up close. Everything is blurry.\" Denies distortion. Patient states she finished the surgical drops as prescribed. \"I finished on Sunday.\"     DOMINGA John 8:33 AM 08/21/2024                      "

## 2024-08-21 NOTE — PROGRESS NOTES
CC -   retinal membrane right eye     INTERVAL HISTORY - Initial visit    HPI -   Lidia Jenkins is a  79 year old patient referred by Dr. Edouard for right eye ERM. Had CE IOL right eye 7/29/24. Vision did not recover completely after surgery. Notes mild distortion.       RETINAL IMAGING:  OCT   OD - ERM with flattening of fovea and thickening of macula; PHF detached  OS - normal foveal anatomy; partial PVD      ASSESSMENT & PLAN    1. Epiretinal membrane (ERM) of right eye    2. Pseudophakia of right eye - Right Eye    3. Dry eyes, bilateral    4. Cataract left eye     Right eye ERM is significant in OCT and examination, causing visual symptoms and affecting daily life    Offered surgery: PPV/MP right eye   - risks of surgery including but not limited to infection, recurrent retinal detachment and need for repeat surgery, progression of cataract, glaucoma discussed. Lidia agreed and wanted to proceed.   - case request place, resident/fellow participation, she will obtain her H&P  - mac with block  - continue AT  - follow left eye with Dr. Edouard    POD1      Complete documentation of historical and exam elements from today's encounter can be found in the full encounter summary report (not reduplicated in this progress note). I personally obtained the chief complaint(s) and history of present illness.  I confirmed and edited as necessary the review of systems, past medical/surgical history, family history, social history, and examination findings as documented by others; and I examined the patient myself. I personally reviewed the relevant tests, images, and reports as documented above. I formulated and edited as necessary the assessment and plan and discussed the findings and management plan with the patient and family.     Bry Kerr MD, PhD

## 2024-08-23 ENCOUNTER — TELEPHONE (OUTPATIENT)
Dept: OPHTHALMOLOGY | Facility: CLINIC | Age: 79
End: 2024-08-23
Payer: COMMERCIAL

## 2024-08-23 PROBLEM — H35.371 EPIRETINAL MEMBRANE (ERM) OF RIGHT EYE: Status: ACTIVE | Noted: 2024-08-21

## 2024-08-23 NOTE — TELEPHONE ENCOUNTER
FUTURE VISIT INFORMATION      SURGERY INFORMATION:  Date: 9/12/24- Eye  Consult: ov/notes 8/21/24    RECORDS REQUESTED FROM:       Primary Care Provider: [facility]    Pertinent Medical History: hypertension

## 2024-08-23 NOTE — TELEPHONE ENCOUNTER
Called patient to schedule surgery with     Spoke with: Lidia    Date(s) of Surgery: 9/12/24    Patient aware of approximate arrival time: No  will get call from preop nursing      Location of surgery: [facility]     Pre-Op H&P: PAC  at [facility] 8/29/24 at 7:00 am     Informed patient that they need to call to schedule pre-op H&P within 30 days of surgery date: NA      Post-Op Appt Dates: 9/13 @ 9:15 AM ([place]),9/18 @ 8:00 AM, 10/9 @ 8:00 AM      Discussed with patient PAC RN will provide arrival time and instructions for surgery at the time of the appointment: [[place] locations only]: Yes      Standard Surgery Packet Sent: Yes 08/23/24  via [employer] Message      Additional Information Sent in Packet:          Informed patient that they will need an adult  to bring patient home from surgery: Yes  : Spouse         Additional Comments:        All patients questions were answered and was instructed to review surgical packet and call back [phone] with any questions or concerns.       Lexie Joe on 8/23/2024 at 12:42 PM

## 2024-08-29 ENCOUNTER — PRE VISIT (OUTPATIENT)
Dept: SURGERY | Facility: CLINIC | Age: 79
End: 2024-08-29

## 2024-08-29 ENCOUNTER — ANESTHESIA EVENT (OUTPATIENT)
Dept: SURGERY | Facility: AMBULATORY SURGERY CENTER | Age: 79
End: 2024-08-29
Payer: COMMERCIAL

## 2024-08-29 ENCOUNTER — VIRTUAL VISIT (OUTPATIENT)
Dept: SURGERY | Facility: CLINIC | Age: 79
End: 2024-08-29
Payer: COMMERCIAL

## 2024-08-29 VITALS — BODY MASS INDEX: 27.49 KG/M2 | WEIGHT: 165 LBS | HEIGHT: 65 IN

## 2024-08-29 DIAGNOSIS — Z01.818 PREOP EXAMINATION: Primary | ICD-10-CM

## 2024-08-29 DIAGNOSIS — H35.371 EPIRETINAL MEMBRANE (ERM) OF RIGHT EYE: ICD-10-CM

## 2024-08-29 PROCEDURE — 99214 OFFICE O/P EST MOD 30 MIN: CPT | Mod: 24 | Performed by: CLINICAL NURSE SPECIALIST

## 2024-08-29 ASSESSMENT — ENCOUNTER SYMPTOMS
DYSRHYTHMIAS: 0
SEIZURES: 0

## 2024-08-29 ASSESSMENT — LIFESTYLE VARIABLES: TOBACCO_USE: 0

## 2024-08-29 ASSESSMENT — PAIN SCALES - GENERAL: PAINLEVEL: NO PAIN (0)

## 2024-08-29 NOTE — PATIENT INSTRUCTIONS
Preparing for Your Surgery      Name:  Lidia Jenkins   MRN:  [id number]   :  1945   Today's Date:  2024         Arriving for surgery:  Surgery date:  24  Arrival time:  12-noon  Surgery time: 1:25 pm    Restrictions due to COVID 19:    Please maintain social distance.  Masking is optional        parking is available for anyone with mobility limitations or disabilities. (Monday- Friday 7 am- 5 pm)    Please come to:    [facility]  [address] [place] [id number]    Check in on the 5th floor, Ambulatory Surgery Center.    What can I eat or drink?    -  You may eat and drink normally until 8 hours before arrival time  (Until 4:00 am on 24)  -  You may have clear liquids until 2 hours before arrival time  (Until 10:00 am on 24)    Examples of clear liquids:  Water  Clear broth  Juices (apple, white grape, white cranberry  and cider) without pulp  Noncarbonated, powder based beverages  (lemonade and Von-Aid)  Sodas (Sprite, 7-Up, ginger ale and seltzer)  Coffee or tea (without milk or cream)  Gatorade    --No alcohol or cannabis products for at least 24 hours before surgery    Which medicines can I take?    Hold Supplements for 7 days before surgery. Vitamin E and glucosamine -chondroitin    Hold Ibuprofen (Advil, Motrin) for 1 day before surgery--unless otherwise directed by surgeon.  Hold Naproxen (Aleve) for 4 days before surgery.          How do I prepare myself?  - Please take 2 showers (one the night prior to surgery and one the morning of surgery) using Scrubcare or Hibiclens soap.    Use this soap only from the neck to your toes.     Leave the soap on your skin for one minute--then rinse thoroughly.      You may use your own shampoo and conditioner; no other hair products.   - Please remove all jewelry and body piercings.  - No lotions, deodorants or fragrance.  - No makeup or fingernail polish.   - Bring your ID and insurance card.    -If  you have a Deep Brain Stimulator, a Spinal Cord Stimulator or any implanted Neuro device you must bring the remote to the Surgery Center          ALL PATIENTS ARE REQUIRED TO HAVE A RESPONSIBLE ADULT TO DRIVE AND BE IN ATTENDANCE WITH THEM FOR 24 HOURS FOLLOWING SURGERY       Covid testing policy as of 12/06/2022  Your surgeon will notify and schedule you for a COVID test if one is needed before surgery--please direct any questions or COVID symptoms to your surgeon      Questions or Concerns:    -For questions regarding the day of surgery please contact the Ambulatory Surgery Center at [phone].    -If you have health changes between today and your surgery please contact your surgeon.     For questions after surgery please call your surgeons office.

## 2024-08-29 NOTE — PROGRESS NOTES
Lidia is a 79 year old who is being evaluated via a billable video visit.    How would you like to obtain your AVS? [employer]  If the video visit is dropped, the invitation should be resent by: Text to cell phone: [phone]

## 2024-08-29 NOTE — H&P
Pre-Operative H & P     CC:  Preoperative exam to assess for increased cardiopulmonary risk while undergoing surgery and anesthesia.    Date of Encounter: 8/29/2024  Primary Care Physician:  No Ref-Primary, Physician     Reason for visit:   Encounter Diagnoses   Name Primary?    Epiretinal membrane (ERM) of right eye     Preop examination Yes       HPI  Lidia Jenkins is a 79 year old female who presents for pre-operative H & P in preparation for  Procedure Information       Case: [id number] Date/Time: 09/12/24 1325    Procedure: VITRECTOMY, PARS PLANA APPROACH, USING 25-GAUGE INSTRUMENTS (Right: Eye)    Anesthesia type: MAC with Block    Diagnosis: Epiretinal membrane (ERM) of right eye [H35.371]    Pre-op diagnosis: Epiretinal membrane (ERM) of right eye [H35.371]    Location: [place] [address] / [facility]    Providers: Bry Jackman MD          History is obtained from the patient and chart review    Patient who was recently referred to Dr. Cirilo Bar for further evaluation of epiretinal membrane of right eye.  She is status post CE IOL of the right eye on 7/29/24, however vision did not recover completely after surgery.  The patient reported mild distortion and visual symptoms affecting her daily life.  After exam she was counseled for above procedure.     Patient's history is otherwise significant for hyperlipidemia, hypertension, and basal cell carcinoma of skin.      Hx of abnormal bleeding or anti-platelet use: Denies    Menstrual history: No LMP recorded. Patient is postmenopausal.     Past Medical History  Past Medical History:   Diagnosis Date    Arthritis     Basal cell carcinoma     Cataract     Epiretinal membrane (ERM) of right eye     Hyperlipidaemia LDL goal < 100     Hyperlipidemia     Hypertension goal BP (blood pressure) < 140/90     Psoriasis        Past Surgical History  Past Surgical History:   Procedure Laterality Date    ABDOMEN  SURGERY  1987    COLONOSCOPY  2012    PHACOEMULSIFICATION WITH STANDARD INTRAOCULAR LENS IMPLANT Right 7/29/2024    Procedure: RIGHT EYE PHACOEMULSIFICATION, CATARACT, WITH STANDARD INTRAOCULAR LENS IMPLANT INSERTION;  Surgeon: Whitney Edouard MD;  Location: [facility] OR    RELEASE TRIGGER FINGER Left 8/15/2019    Procedure: Left Middle Finger Trigger Release;  Surgeon: Castro Navarro MD;  [facility]       Prior to Admission Medications  Current Outpatient Medications   Medication Sig Dispense Refill    atenolol (TENORMIN) 25 MG tablet TAKE 1 TABLET BY MOUTH DAILY (Patient taking differently: Take 25 mg by mouth every evening.) 90 tablet 3    calcium citrate-vitamin D (CITRACAL) 200-6.25 MG-MCG TABS per tablet Take 1 tablet by mouth every evening.      cholecalciferol (VITAMIN D3) 25 mcg (1000 units) capsule Take 1 capsule by mouth every evening.      glucosamine-chondroitin 500-400 MG CAPS per capsule Take 1 capsule by mouth every evening      ketoconazole (NIZORAL) 2 % external cream Apply topically daily Use as needed for itchy areas on the face and ears 60 g 4    lovastatin (MEVACOR) 40 MG tablet Take 1 tablet (40 mg) by mouth at bedtime 90 tablet 3    vitamin E (TOCOPHEROL) 400 units (180 mg) capsule Take 400 Units by mouth every evening         Allergies  No Known Allergies    Social History  Social History     Socioeconomic History    Marital status: Single     Spouse name: Not on file    Number of children: Not on file    Years of education: Not on file    Highest education level: Not on file   Occupational History    Not on file   Tobacco Use    Smoking status: Never    Smokeless tobacco: Never   Substance and Sexual Activity    Alcohol use: Yes     Alcohol/week: 3.0 standard drinks of alcohol     Types: 3 Standard drinks or equivalent per week     Comment: 1-3 glasses of wine a week    Drug use: No    Sexual activity: Not Currently   Other Topics Concern    Parent/sibling w/ CABG, MI or  angioplasty before 65F 55M? Yes     Comment: father, brother   Social History Narrative    Not on file     Social Determinants of Health     Financial Resource Strain: Not on file   Food Insecurity: Not on file   Transportation Needs: Not on file   Physical Activity: Not on file   Stress: Not on file   Social Connections: Not on file   Interpersonal Safety: Not on file   Housing Stability: Not on file       Family History  Family History   Problem Relation Age of Onset    Hypertension Mother     Hyperlipidemia Mother     Depression Mother     Coronary Artery Disease Father         MI    Hypertension Father     Hyperlipidemia Father     Substance Abuse Father     Heart Failure Father     Hypertension Sister     Hyperlipidemia Sister     Hypertension Sister     Hyperlipidemia Sister     Depression Sister     Substance Abuse Sister     Hypertension Brother     Heart Disease Brother     Hyperlipidemia Brother     Coronary Artery Disease Brother     Hypertension Brother     Substance Abuse Brother     Hyperlipidemia Brother     Other Cancer No family hx of     Melanoma No family hx of     Skin Cancer No family hx of     Macular Degeneration No family hx of     Glaucoma No family hx of     Anesthesia Reaction No family hx of     Venous thrombosis No family hx of        Review of Systems  The complete review of systems is negative other than noted in the HPI or here.   Anesthesia Evaluation   Pt has had prior anesthetic. Type: MAC and General.    No history of anesthetic complications       ROS/MED HX  ENT/Pulmonary:     (+)     DWAYNE risk factors, snores loudly, hypertension,                              (-) tobacco use and recent URI   Neurologic:  - neg neurologic ROS  (-) no seizures and no CVA   Cardiovascular:     (+) Dyslipidemia hypertension-range: Home monitoring 130-140/80s/ -   -  - -                                 Previous cardiac testing   Echo: Date: 2015 Results:    Stress Test:  Date: Results:    ECG  Reviewed:  Date: 2015 Results:  SR, LAD, LAFB, no change from previous  Cath:  Date: Results:   (-) taking anticoagulants/antiplatelets, MEJÍA and arrhythmias   METS/Exercise Tolerance: >4 METS    Hematologic:  - neg hematologic  ROS  (-) history of blood transfusion   Musculoskeletal:  - neg musculoskeletal ROS     GI/Hepatic: Comment: Occasional GERD symptoms, with no specific meds    (+) GERD, Other,                  Renal/Genitourinary:  - neg Renal ROS     Endo:  - neg endo ROS     Psychiatric/Substance Use:  - neg psychiatric ROS     Infectious Disease:  - neg infectious disease ROS     Malignancy: Comment: BCC  (+) Malignancy, History of Skin.Skin CA Remission status post Surgery.      Other:  - neg other ROS          Virtual visit -  No vitals were obtained    Physical Exam  Constitutional: Awake, alert, no apparent distress, and appears stated age.  HENT: Normocephalic  Respiratory: non labored breathing; no cough   Neurologic: Oriented to name, place and time.   Neuropsychiatric: Calm, cooperative. Normal affect.      Prior Labs/Diagnostic Studies   All labs and imaging personally reviewed   OSH Last labs 9/13/23  Na 141  K 4.8  Cl 106  Cr 0.84  Glu 92  LFTs normal    CBC normal    EKG, last 2015 Sinus rhythm, left axis deviation, left anterior fascicular block. No change from previous       The patient's records and results personally reviewed by this provider.     Outside records reviewed from: Care Everywhere      Assessment    Lidia Jenkins is a 79 year old female seen as a PAC referral for risk assessment and optimization for anesthesia.    Plan/Recommendations  Pt will be optimized for the proposed procedure.  See below for details on the assessment, risk, and preoperative recommendations    NEUROLOGY  - No history of TIA, CVA or seizure    -Post Op delirium risk factors:  No risk identified    ENT  - No current airway concerns.  Will need to be reassessed day of surgery.  Mallampati: Unable to  "assess  TM: Unable to assess    CARDIAC  Hyperlipidemia. Lovastatin at at bedtime. Hypertension. Atenolol at at bedtime.  No other cardiac history, symptoms, or meds.  Good activity tolerance, walking 2 miles daily without exertional symptoms  - METS (Metabolic Equivalents)>4  Patient performs 4 or more METS exercise without symptoms             Total Score: 0      RCRI-Very low risk: Class 1 0.4% complication rate             Total Score: 0        PULMONARY  Denies asthma, cough or use of inhaler  Able to lie flat without difficulty  DWAYNE Medium Risk             Total Score: 3    DWAYNE: Snores loudly    DWAYNE: Hypertension    DWAYNE: Over 50 ys old      - Tobacco History    History   Smoking Status    Never   Smokeless Tobacco    Never       GI: Occasional GERD symptoms without specific meds   PONV Medium Risk  Total Score: 2           1 AN PONV: Pt is Female    1 AN PONV: Patient is not a current smoker        /RENAL  - Baseline Creatinine  Last 0.84    ENDOCRINE    - BMI: Estimated body mass index is 27.46 kg/m  as calculated from the following:    Height as of this encounter: 1.651 m (5' 5\").    Weight as of this encounter: 74.8 kg (165 lb).  Overweight (BMI 25.0-29.9)  - No history of Diabetes Mellitus  Last TSH 3.62    HEME  VTE Low Risk 0.26%             Total Score: 1    VTE: Greater than 59 yrs old      Denies personal or family history of blood clots  Denies history of blood transfusion   Last CBC normal    MSK: No frailty    Different anesthesia methods/types have been discussed with the patient, but they are aware that the final plan will be decided by the assigned anesthesia provider on the date of service.    The patient is optimized for their procedure. AVS with information on surgery time/arrival time, meds and NPO status given by nursing staff. No further diagnostic testing indicated.    Please refer to the physical examination documented by the anesthesiologist in the anesthesia record on the day of " surgery.    Video-Visit Details    Type of service:  Video Visit    Provider received verbal consent for a Video Visit from the patient? Yes   Video Start Time:  6:59am  Video End Time: 7:06am    Originating Location (pt. Location): Home    Distant Location (provider location):  Off-site  Mode of Communication:  Video Conference via [employer]  On the day of service:     Prep time: 12 minutes  Visit time: 7 minutes  Documentation time: 12 minutes  ------------------------------------------  Total time: 31 minutes      TIFFANY Fernandez CNS  Preoperative Assessment Center  [facility]  Clinic and Surgery Center  Phone: [phone]  Fax: [phone]

## 2024-09-08 ENCOUNTER — MYC MEDICAL ADVICE (OUTPATIENT)
Dept: SURGERY | Facility: AMBULATORY SURGERY CENTER | Age: 79
End: 2024-09-08
Payer: COMMERCIAL

## 2024-09-09 NOTE — TELEPHONE ENCOUNTER
FUTURE VISIT INFORMATION      SURGERY INFORMATION:  Date: 10/7/24  [facility]  Surgeon:  Bry Jackman MD   Anesthesia Type:  mac with block  Procedure: RIGHT EYE VITRECTOMY, PARS PLANA APPROACH, USING 25-GAUGE INSTRUMENTS   RECORDS REQUESTED FROM:         Primary Care Provider: [facility]     Pertinent Medical History: hypertension

## 2024-10-01 ENCOUNTER — PRE VISIT (OUTPATIENT)
Dept: SURGERY | Facility: CLINIC | Age: 79
End: 2024-10-01

## 2024-10-01 DIAGNOSIS — I10 HYPERTENSION GOAL BP (BLOOD PRESSURE) < 140/90: ICD-10-CM

## 2024-10-02 ENCOUNTER — PRE VISIT (OUTPATIENT)
Dept: SURGERY | Facility: CLINIC | Age: 79
End: 2024-10-02

## 2024-10-02 ENCOUNTER — VIRTUAL VISIT (OUTPATIENT)
Dept: SURGERY | Facility: CLINIC | Age: 79
End: 2024-10-02
Payer: COMMERCIAL

## 2024-10-02 VITALS — BODY MASS INDEX: 27.49 KG/M2 | WEIGHT: 165 LBS | HEIGHT: 65 IN

## 2024-10-02 DIAGNOSIS — Z01.818 PRE-OP EVALUATION: Primary | ICD-10-CM

## 2024-10-02 PROCEDURE — 99212 OFFICE O/P EST SF 10 MIN: CPT | Mod: 24 | Performed by: PHYSICIAN ASSISTANT

## 2024-10-02 ASSESSMENT — ENCOUNTER SYMPTOMS
SEIZURES: 0
DYSRHYTHMIAS: 0

## 2024-10-02 ASSESSMENT — LIFESTYLE VARIABLES: TOBACCO_USE: 0

## 2024-10-02 NOTE — PROGRESS NOTES
Lidia is a 79 year old who is being evaluated via a billable video visit.      Wero Haley   Lidia is a 79 year old, presenting for the following health issues:  Pre-Op Exam (/)    TELMA Moy LPN

## 2024-10-02 NOTE — PATIENT INSTRUCTIONS
Preparing for Your Surgery      Name:  Lidia Jenkins   MRN:  [id number]   :  1945   Today's Date:  10/2/2024         Arriving for surgery:  Surgery date:  10/07/2024  Arrival time:  9:00 am    Restrictions due to COVID 19:    Please maintain social distance.  Masking is optional.      parking is available for anyone with mobility limitations or disabilities. (Monday- Friday 7 am- 5 pm)    Please come to:    [facility]  [address] [place] [phone]    Please check in on the 5th floor at the Ambulatory Surgery Center.      What can I eat or drink?    -  You may eat and drink normally until 8 hours prior to arrival  time. (Until Midnight)  -  You may have clear liquids until 2 hours prior to arrival  time. (Until 7 am)    Examples of clear liquids:  Water  Clear broth  Juices (apple, white grape, white cranberry  and cider) without pulp  Noncarbonated, powder based beverages  (lemonade and Von-Aid)  Sodas (Sprite, 7-Up, ginger ale and seltzer)  Coffee or tea (without milk or cream)  Gatorade      Which medicines can I take?    Hold Aspirin for 7 days before surgery.   Hold Multivitamins for 7 days before surgery.    Hold Supplements for 7 days before surgery. (Vitamin E, Glucosamine)    Hold Ibuprofen (Advil, Motrin) for 1 day before surgery--unless otherwise directed by surgeon.  Hold Naproxen (Aleve) for 4 days before surgery.    No alcohol or cannabis products for 24 hours prior to procedure.        -  PLEASE TAKE the following medications the day of surgery:   NONE      How do I prepare myself?  - Please take 2 showers (one the night prior to surgery and one the morning of surgery) using Scrubcare or Hibiclens soap.    Use this soap only from the neck to your toes.     Leave the soap on your skin for one minute--then rinse thoroughly.      You may use your own shampoo and conditioner. No other hair products.   - Please remove all jewelry and body piercings.  -  No lotions, deodorants or fragrance.  - No makeup or fingernail polish.   - Bring your ID and insurance card.    -If you have a Deep Brain Stimulator, a Spinal Cord Stimulator, or any implanted Neuro Device, you must bring the remote to the Surgery Center.         ALL PATIENTS ARE REQUIRED TO HAVE A RESPONSIBLE ADULT TO DRIVE AND BE IN ATTENDANCE WITH THEM FOR 24 HOURS FOLLOWING SURGERY.     Covid testing policy as of 12/06/2022  Your surgeon will notify and schedule you for a COVID test if one is needed before surgery--please direct any questions or COVID symptoms to your surgeon      Questions or Concerns:    -For questions regarding the day of surgery, please contact the Ambulatory Surgery Center at [phone].    -If you have health changes between today and your surgery, please contact your surgeon.     - For questions after surgery, please contact your surgeon's office.

## 2024-10-02 NOTE — H&P
Pre-Operative H & P     CC:  Preoperative exam to assess for increased cardiopulmonary risk while undergoing surgery and anesthesia.    Date of Encounter: 10/2/2024  Primary Care Physician:  No Ref-Primary, Physician     Reason for visit:   Encounter Diagnosis   Name Primary?    Pre-op evaluation Yes       HPI  Lidia Jenkins is a 79 year old female who presents for pre-operative H & P in preparation for  Procedure Information       Case: [id number] Date/Time: 10/07/24 1030    Procedure: RIGHT EYE VITRECTOMY, PARS PLANA APPROACH, USING 25-GAUGE INSTRUMENTS (Right: Eye)    Anesthesia type: MAC with Block    Diagnosis: Epiretinal membrane (ERM) of right eye [H35.371]    Pre-op diagnosis: Epiretinal membrane (ERM) of right eye [H35.371]    Location: [place] [address] / [facility] [place] [facility] [place]    Providers: Bry Jackman MD            History is obtained from the patient and chart review     Patient who was recently referred to Dr. Cirilo Bar for further evaluation of epiretinal membrane of right eye.  She is status post CE IOL of the right eye on 7/29/24, however vision did not recover completely after surgery.  The patient reported mild distortion and visual symptoms affecting her daily life.  After exam she was counseled for above procedure. Of note, she was previously scheduled for this procedure, but testing positive for covid 4 days prior to surgery. She is now rescheduled as above.      Patient's history is otherwise significant for hyperlipidemia, hypertension, and basal cell carcinoma of skin.     Hx of abnormal bleeding or anti-platelet use: denies    Menstrual history: No LMP recorded. Patient is postmenopausal.     Past Medical History  Past Medical History:   Diagnosis Date    Arthritis     Basal cell carcinoma     Cataract     Epiretinal membrane (ERM) of right eye     Hyperlipidaemia LDL goal < 100     Hyperlipidemia     Hypertension goal BP (blood  pressure) < 140/90     Psoriasis        Past Surgical History  Past Surgical History:   Procedure Laterality Date    ABDOMEN SURGERY  1987    COLONOSCOPY  2012    PHACOEMULSIFICATION WITH STANDARD INTRAOCULAR LENS IMPLANT Right 7/29/2024    Procedure: RIGHT EYE PHACOEMULSIFICATION, CATARACT, WITH STANDARD INTRAOCULAR LENS IMPLANT INSERTION;  Surgeon: Whitney Edouard MD;  Location: [facility] OR    RELEASE TRIGGER FINGER Left 8/15/2019    Procedure: Left Middle Finger Trigger Release;  Surgeon: Castro Navarro MD;  [facility]       Prior to Admission Medications  Current Outpatient Medications   Medication Sig Dispense Refill    atenolol (TENORMIN) 25 MG tablet TAKE 1 TABLET BY MOUTH DAILY (Patient taking differently: Take 25 mg by mouth every evening.) 90 tablet 3    calcium citrate-vitamin D (CITRACAL) 200-6.25 MG-MCG TABS per tablet Take 1 tablet by mouth every evening.      cholecalciferol (VITAMIN D3) 25 mcg (1000 units) capsule Take 1 capsule by mouth every evening.      glucosamine-chondroitin 500-400 MG CAPS per capsule Take 1 capsule by mouth every evening      ketoconazole (NIZORAL) 2 % external cream Apply topically daily Use as needed for itchy areas on the face and ears 60 g 4    lovastatin (MEVACOR) 40 MG tablet Take 1 tablet (40 mg) by mouth at bedtime 90 tablet 3    vitamin E (TOCOPHEROL) 400 units (180 mg) capsule Take 400 Units by mouth every evening         Allergies  No Known Allergies    Social History  Social History     Socioeconomic History    Marital status: Single     Spouse name: Not on file    Number of children: Not on file    Years of education: Not on file    Highest education level: Not on file   Occupational History    Not on file   Tobacco Use    Smoking status: Never    Smokeless tobacco: Never   Substance and Sexual Activity    Alcohol use: Yes     Alcohol/week: 3.0 standard drinks of alcohol     Types: 3 Standard drinks or equivalent per week     Comment: 1-3  glasses of wine a week    Drug use: No    Sexual activity: Not Currently   Other Topics Concern    Parent/sibling w/ CABG, MI or angioplasty before 65F 55M? Yes     Comment: father, brother   Social History Narrative    Not on file     Social Determinants of Health     Financial Resource Strain: Not on file   Food Insecurity: Not on file   Transportation Needs: Not on file   Physical Activity: Not on file   Stress: Not on file   Social Connections: Not on file   Interpersonal Safety: Not on file   Housing Stability: Not on file       Family History  Family History   Problem Relation Age of Onset    Hypertension Mother     Hyperlipidemia Mother     Depression Mother     Coronary Artery Disease Father         MI    Hypertension Father     Hyperlipidemia Father     Substance Abuse Father     Heart Failure Father     Hypertension Sister     Hyperlipidemia Sister     Hypertension Sister     Hyperlipidemia Sister     Depression Sister     Substance Abuse Sister     Hypertension Brother     Heart Disease Brother     Hyperlipidemia Brother     Coronary Artery Disease Brother     Hypertension Brother     Substance Abuse Brother     Hyperlipidemia Brother     Other Cancer No family hx of     Melanoma No family hx of     Skin Cancer No family hx of     Macular Degeneration No family hx of     Glaucoma No family hx of     Anesthesia Reaction No family hx of     Venous thrombosis No family hx of        Review of Systems  The complete review of systems is negative other than noted in the HPI or here.   Anesthesia Evaluation   Pt has had prior anesthetic. Type: MAC and General.    No history of anesthetic complications       ROS/MED HX  ENT/Pulmonary:     (+)     DWAYNE risk factors, snores loudly, hypertension,                              (-) tobacco use and recent URI   Neurologic:  - neg neurologic ROS  (-) no seizures and no CVA   Cardiovascular:     (+) Dyslipidemia hypertension-range: Home monitoring 130-140/80s/ -   -  - -                                  Previous cardiac testing   Echo: Date: Results:    Stress Test:  Date: Results:    ECG Reviewed:  Date: 2015 Results:  SR, LAD, LAFB, no change from previous  Cath:  Date: Results:   (-) taking anticoagulants/antiplatelets, MEJÍA and arrhythmias   METS/Exercise Tolerance: >4 METS Comment: Silver sneakers, walking 2 miles daily, gardening    Hematologic:  - neg hematologic  ROS  (-) history of blood transfusion   Musculoskeletal:  - neg musculoskeletal ROS     GI/Hepatic: Comment: Occasional GERD symptoms, with no specific meds    (+) GERD, Other,                  Renal/Genitourinary:  - neg Renal ROS     Endo:  - neg endo ROS     Psychiatric/Substance Use:  - neg psychiatric ROS     Infectious Disease:       Malignancy: Comment: BCC  (+) Malignancy, History of Skin.Skin CA Remission status post Surgery.      Other:  - neg other ROS          Virtual visit -  No vitals were obtained    Physical Exam  Constitutional: Awake, alert, cooperative, no apparent distress, and appears stated age.  HENT: Normocephalic  Respiratory: non labored breathing   Neurologic: Awake, alert, oriented to name, place and time.   Neuropsychiatric: Calm, cooperative. Normal affect.      Prior Labs/Diagnostic Studies   All labs and imaging personally reviewed     EKG/ stress test - if available please see in ROS above   No results found.       No data to display                  The patient's records and results personally reviewed by this provider.     Outside records reviewed from: Care Everywhere      Assessment    Lidia Jenkins is a 79 year old female seen as a PAC referral for risk assessment and optimization for anesthesia.    Plan/Recommendations  Pt will be optimized for the proposed procedure.  See below for details on the assessment, risk, and preoperative recommendations    NEUROLOGY  - No history of TIA, CVA or seizure  -Post Op delirium risk factors:  No risk identified    ENT  - No current airway  "concerns.  Will need to be reassessed day of surgery.  Mallampati: Unable to assess  TM: Unable to assess    CARDIAC  -dyslipidemia using lovastatin  -hypertension using atenolol  -denies cardiac history or symptoms   - METS (Metabolic Equivalents)  Patient performs 4 or more METS exercise without symptoms             Total Score: 0      RCRI-Very low risk: Class 1 0.4% complication rate             Total Score: 0        PULMONARY  DWAYNE Medium Risk             Total Score: 3    DWAYNE: Snores loudly    DWAYNE: Hypertension    DWAYNE: Over 50 ys old      - Denies asthma or inhaler use  - Tobacco History    History   Smoking Status    Never   Smokeless Tobacco    Never       GI  PONV Medium Risk  Total Score: 2           1 AN PONV: Pt is Female    1 AN PONV: Patient is not a current smoker        /RENAL  - Baseline Creatinine WNL    ENDOCRINE    - BMI: Estimated body mass index is 27.46 kg/m  as calculated from the following:    Height as of 8/29/24: 1.651 m (5' 5\").    Weight as of 8/29/24: 74.8 kg (165 lb).  Overweight (BMI 25.0-29.9)  - No history of Diabetes Mellitus    HEME  VTE Low Risk 0.26%             Total Score: 1    VTE: Greater than 59 yrs old      - No history of abnormal bleeding or antiplatelet use.    MSK  -PM&R referral not indicated     ID  Surgery was delayed August due to patient sandeep COVID. At the time, she experienced fever, body aches, cough, fatigue for 3-4 days. She reports symptoms resolved. She said she currently has a rare, dry cough.     Different anesthesia methods/types have been discussed with the patient, but they are aware that the final plan will be decided by the assigned anesthesia provider on the date of service.    The patient is optimized for their procedure. AVS with information on surgery time/arrival time, meds and NPO status given by nursing staff. No further diagnostic testing indicated.    Please refer to the physical examination documented by the anesthesiologist in the " anesthesia record on the day of surgery.    Video-Visit Details    Type of service:  Video Visit    Provider received verbal consent for a Video Visit from the patient? Yes     Originating Location (pt. Location): Home    Distant Location (provider location):  Off-site  Mode of Communication:  Video Conference via [email or web address]  On the day of service:     Prep time: 5 minutes  Visit time: 8 minutes  Documentation time: 3 minutes  ------------------------------------------  Total time: 16 minutes      Kell Mejias PA-C  Preoperative Assessment Center  [facility]  Cl[facility] and [facility] Center  Phone: [phone]  Fax: [phone]

## 2024-10-04 ASSESSMENT — ENCOUNTER SYMPTOMS
DYSRHYTHMIAS: 0
SEIZURES: 0

## 2024-10-04 ASSESSMENT — LIFESTYLE VARIABLES: TOBACCO_USE: 0

## 2024-10-04 NOTE — ANESTHESIA PREPROCEDURE EVALUATION
Anesthesia Pre-Procedure Evaluation    Patient: Lidia Jenkins   MRN: [id number] : 1945        Procedure : Procedure(s):  RIGHT EYE VITRECTOMY, PARS PLANA APPROACH, USING 25-GAUGE INSTRUMENTS          Past Medical History:   Diagnosis Date     Arthritis      Basal cell carcinoma      Cataract      Epiretinal membrane (ERM) of right eye      Hyperlipidaemia LDL goal < 100      Hyperlipidemia      Hypertension goal BP (blood pressure) < 140/90      Psoriasis       Past Surgical History:   Procedure Laterality Date     ABDOMEN SURGERY       COLONOSCOPY       PHACOEMULSIFICATION WITH STANDARD INTRAOCULAR LENS IMPLANT Right 2024    Procedure: RIGHT EYE PHACOEMULSIFICATION, CATARACT, WITH STANDARD INTRAOCULAR LENS IMPLANT INSERTION;  Surgeon: Whitney Edouard MD;  Location: [facility] OR     RELEASE TRIGGER FINGER Left 8/15/2019    Procedure: Left Middle Finger Trigger Release;  Surgeon: Castro Navarro MD;  [facility]      No Known Allergies   Social History     Tobacco Use     Smoking status: Never     Passive exposure: Never     Smokeless tobacco: Never   Substance Use Topics     Alcohol use: Yes     Alcohol/week: 3.0 standard drinks of alcohol     Types: 3 Standard drinks or equivalent per week     Comment: 1-3 glasses of wine a week      Wt Readings from Last 1 Encounters:   10/02/24 74.8 kg (165 lb)        Anesthesia Evaluation   Pt has had prior anesthetic. Type: MAC and General.    No history of anesthetic complications       ROS/MED HX  ENT/Pulmonary:     (+)     DWAYNE risk factors, snores loudly, hypertension,                              (-) tobacco use and recent URI   Neurologic:  - neg neurologic ROS  (-) no seizures and no CVA   Cardiovascular:     (+) Dyslipidemia hypertension-range: Home monitoring 130-140/80s/ -   -  - -                                 Previous cardiac testing   Echo: Date: Results:    Stress Test:  Date: Results:    ECG Reviewed:  Date:   "Results:  SR, LAD, LAFB, no change from previous  Cath:  Date: Results:   (-) taking anticoagulants/antiplatelets, MEJÍA and arrhythmias   METS/Exercise Tolerance: >4 METS Comment: Silver sneakers, walking 2 miles daily, gardening    Hematologic:  - neg hematologic  ROS  (-) history of blood transfusion   Musculoskeletal:  - neg musculoskeletal ROS     GI/Hepatic: Comment: Occasional GERD symptoms, with no specific meds    (+) GERD, Other,                  Renal/Genitourinary:  - neg Renal ROS     Endo:  - neg endo ROS     Psychiatric/Substance Use:  - neg psychiatric ROS     Infectious Disease:       Malignancy: Comment: BCC  (+) Malignancy, History of Skin.Skin CA Remission status post Surgery.      Other:  - neg other ROS          Physical Exam    Airway        Mallampati: II   TM distance: > 3 FB   Neck ROM: full   Mouth opening: > 3 cm    Respiratory Devices and Support         Dental       (+) Modest Abnormalities - crowns, retainers, 1 or 2 missing teeth      Cardiovascular   cardiovascular exam normal       Rhythm and rate: regular     Pulmonary   pulmonary exam normal        breath sounds clear to auscultation       OUTSIDE LABS:  CBC:   Lab Results   Component Value Date    WBC 5.8 09/13/2023    WBC 4.9 06/15/2021    HGB 12.9 09/13/2023    HGB 13.7 08/27/2022    HCT 39.0 09/13/2023    HCT 40.5 06/15/2021     09/13/2023     06/15/2021     BMP:   Lab Results   Component Value Date     09/13/2023     08/27/2022    POTASSIUM 4.8 09/13/2023    POTASSIUM 4.3 08/27/2022    CHLORIDE 106 09/13/2023    CHLORIDE 110 (H) 08/27/2022    CO2 25 09/13/2023    CO2 27 08/27/2022    BUN 26.0 (H) 09/13/2023    BUN 19 08/27/2022    CR 0.84 09/13/2023    CR 0.75 08/27/2022    GLC 92 09/13/2023    GLC 98 08/27/2022     COAGS: No results found for: \"PTT\", \"INR\", \"FIBR\"  POC: No results found for: \"BGM\", \"HCG\", \"HCGS\"  HEPATIC:   Lab Results   Component Value Date    ALBUMIN 4.6 09/13/2023    PROTTOTAL 7.2 " "09/13/2023    ALT 18 09/13/2023    AST 27 09/13/2023    ALKPHOS 52 09/13/2023    BILITOTAL 0.3 09/13/2023     OTHER:   Lab Results   Component Value Date    TAMIKO 9.9 09/13/2023    MAG 2.7 (H) 09/13/2023    TSH 3.62 09/13/2023       Anesthesia Plan    ASA Status:  3    NPO Status:  NPO Appropriate    Anesthesia Type: MAC.     - Reason for MAC: straight local not clinically adequate              Consents    Anesthesia Plan(s) and associated risks, benefits, and realistic alternatives discussed. Questions answered and patient/representative(s) expressed understanding.     - Discussed:     - Discussed with:  Patient      - Extended Intubation/Ventilatory Support Discussed: No.      - Patient is DNR/DNI Status: No     Use of blood products discussed: No .     Postoperative Care    Pain management: Multi-modal analgesia.        Comments:               Karen Mayo MD    I have reviewed the pertinent notes and labs in the chart from the past 30 days and (re)examined the patient.  Any updates or changes from those notes are reflected in this note.              # Hypertension: Noted on problem list         # Overweight: Estimated body mass index is 27.46 kg/m  as calculated from the following:    Height as of 10/2/24: 1.651 m (5' 5\").    Weight as of 10/2/24: 74.8 kg (165 lb).             "

## 2024-10-07 ENCOUNTER — HOSPITAL ENCOUNTER (OUTPATIENT)
Facility: AMBULATORY SURGERY CENTER | Age: 79
Discharge: HOME | End: 2024-10-07
Attending: OPHTHALMOLOGY | Admitting: OPHTHALMOLOGY
Payer: COMMERCIAL

## 2024-10-07 ENCOUNTER — ANESTHESIA (OUTPATIENT)
Dept: SURGERY | Facility: AMBULATORY SURGERY CENTER | Age: 79
End: 2024-10-07
Payer: COMMERCIAL

## 2024-10-07 VITALS
WEIGHT: 165 LBS | DIASTOLIC BLOOD PRESSURE: 72 MMHG | SYSTOLIC BLOOD PRESSURE: 128 MMHG | TEMPERATURE: 97 F | RESPIRATION RATE: 14 BRPM | HEIGHT: 64 IN | OXYGEN SATURATION: 95 % | HEART RATE: 60 BPM | BODY MASS INDEX: 28.17 KG/M2

## 2024-10-07 DIAGNOSIS — H35.371 EPIRETINAL MEMBRANE (ERM) OF RIGHT EYE: ICD-10-CM

## 2024-10-07 DIAGNOSIS — Z48.810 AFTERCARE FOLLOWING SURGERY OF A SENSE ORGAN: Primary | ICD-10-CM

## 2024-10-07 PROCEDURE — 67036 REMOVAL OF INNER EYE FLUID: CPT | Performed by: ANESTHESIOLOGY

## 2024-10-07 PROCEDURE — 99100 ANES PT EXTEME AGE<1 YR&>70: CPT | Performed by: NURSE ANESTHETIST, CERTIFIED REGISTERED

## 2024-10-07 PROCEDURE — 67041 VIT FOR MACULAR PUCKER: CPT | Mod: RT

## 2024-10-07 PROCEDURE — 67036 REMOVAL OF INNER EYE FLUID: CPT | Performed by: NURSE ANESTHETIST, CERTIFIED REGISTERED

## 2024-10-07 PROCEDURE — 67041 VIT FOR MACULAR PUCKER: CPT | Mod: 79 | Performed by: OPHTHALMOLOGY

## 2024-10-07 PROCEDURE — 99100 ANES PT EXTEME AGE<1 YR&>70: CPT | Performed by: ANESTHESIOLOGY

## 2024-10-07 RX ORDER — DEXAMETHASONE SODIUM PHOSPHATE 4 MG/ML
INJECTION, SOLUTION INTRA-ARTICULAR; INTRALESIONAL; INTRAMUSCULAR; INTRAVENOUS; SOFT TISSUE PRN
Status: DISCONTINUED | OUTPATIENT
Start: 2024-10-07 | End: 2024-10-07 | Stop reason: HOSPADM

## 2024-10-07 RX ORDER — ONDANSETRON 2 MG/ML
4 INJECTION INTRAMUSCULAR; INTRAVENOUS EVERY 30 MIN PRN
Status: DISCONTINUED | OUTPATIENT
Start: 2024-10-07 | End: 2024-10-08 | Stop reason: HOSPADM

## 2024-10-07 RX ORDER — FENTANYL CITRATE 50 UG/ML
25 INJECTION, SOLUTION INTRAMUSCULAR; INTRAVENOUS
Status: DISCONTINUED | OUTPATIENT
Start: 2024-10-07 | End: 2024-10-08 | Stop reason: HOSPADM

## 2024-10-07 RX ORDER — ACETAMINOPHEN 325 MG/1
975 TABLET ORAL ONCE
Status: COMPLETED | OUTPATIENT
Start: 2024-10-07 | End: 2024-10-07

## 2024-10-07 RX ORDER — ONDANSETRON 2 MG/ML
INJECTION INTRAMUSCULAR; INTRAVENOUS PRN
Status: DISCONTINUED | OUTPATIENT
Start: 2024-10-07 | End: 2024-10-07

## 2024-10-07 RX ORDER — OXYCODONE HYDROCHLORIDE 5 MG/1
10 TABLET ORAL
Status: DISCONTINUED | OUTPATIENT
Start: 2024-10-07 | End: 2024-10-08 | Stop reason: HOSPADM

## 2024-10-07 RX ORDER — PROPOFOL 10 MG/ML
INJECTION, EMULSION INTRAVENOUS PRN
Status: DISCONTINUED | OUTPATIENT
Start: 2024-10-07 | End: 2024-10-07

## 2024-10-07 RX ORDER — SODIUM CHLORIDE, SODIUM LACTATE, POTASSIUM CHLORIDE, CALCIUM CHLORIDE 600; 310; 30; 20 MG/100ML; MG/100ML; MG/100ML; MG/100ML
INJECTION, SOLUTION INTRAVENOUS CONTINUOUS
Status: DISCONTINUED | OUTPATIENT
Start: 2024-10-07 | End: 2024-10-07 | Stop reason: HOSPADM

## 2024-10-07 RX ORDER — PREDNISOLONE ACETATE 10 MG/ML
1 SUSPENSION/ DROPS OPHTHALMIC 4 TIMES DAILY
Qty: 5 ML | Refills: 1 | Status: SHIPPED | OUTPATIENT
Start: 2024-10-07

## 2024-10-07 RX ORDER — OXYCODONE HYDROCHLORIDE 5 MG/1
5 TABLET ORAL
Status: DISCONTINUED | OUTPATIENT
Start: 2024-10-07 | End: 2024-10-08 | Stop reason: HOSPADM

## 2024-10-07 RX ORDER — ATROPINE SULFATE 10 MG/ML
SOLUTION/ DROPS OPHTHALMIC PRN
Status: DISCONTINUED | OUTPATIENT
Start: 2024-10-07 | End: 2024-10-07 | Stop reason: HOSPADM

## 2024-10-07 RX ORDER — BALANCED SALT SOLUTION 6.4; .75; .48; .3; 3.9; 1.7 MG/ML; MG/ML; MG/ML; MG/ML; MG/ML; MG/ML
SOLUTION OPHTHALMIC PRN
Status: DISCONTINUED | OUTPATIENT
Start: 2024-10-07 | End: 2024-10-07 | Stop reason: HOSPADM

## 2024-10-07 RX ORDER — ONDANSETRON 4 MG/1
4 TABLET, ORALLY DISINTEGRATING ORAL EVERY 30 MIN PRN
Status: DISCONTINUED | OUTPATIENT
Start: 2024-10-07 | End: 2024-10-08 | Stop reason: HOSPADM

## 2024-10-07 RX ORDER — NALOXONE HYDROCHLORIDE 0.4 MG/ML
0.1 INJECTION, SOLUTION INTRAMUSCULAR; INTRAVENOUS; SUBCUTANEOUS
Status: DISCONTINUED | OUTPATIENT
Start: 2024-10-07 | End: 2024-10-08 | Stop reason: HOSPADM

## 2024-10-07 RX ORDER — LIDOCAINE 40 MG/G
CREAM TOPICAL
Status: DISCONTINUED | OUTPATIENT
Start: 2024-10-07 | End: 2024-10-07 | Stop reason: HOSPADM

## 2024-10-07 RX ORDER — OFLOXACIN 3 MG/ML
1 SOLUTION/ DROPS OPHTHALMIC 4 TIMES DAILY
Qty: 5 ML | Refills: 0 | Status: SHIPPED | OUTPATIENT
Start: 2024-10-07

## 2024-10-07 RX ORDER — CYCLOPENTOLAT/TROPIC/PHENYLEPH 1%-1%-2.5%
1 DROPS (EA) OPHTHALMIC (EYE)
Status: COMPLETED | OUTPATIENT
Start: 2024-10-07 | End: 2024-10-07

## 2024-10-07 RX ORDER — DEXAMETHASONE SODIUM PHOSPHATE 10 MG/ML
4 INJECTION, SOLUTION INTRAMUSCULAR; INTRAVENOUS
Status: DISCONTINUED | OUTPATIENT
Start: 2024-10-07 | End: 2024-10-08 | Stop reason: HOSPADM

## 2024-10-07 RX ORDER — LIDOCAINE HYDROCHLORIDE 20 MG/ML
INJECTION, SOLUTION INFILTRATION; PERINEURAL PRN
Status: DISCONTINUED | OUTPATIENT
Start: 2024-10-07 | End: 2024-10-07

## 2024-10-07 RX ADMIN — LIDOCAINE HYDROCHLORIDE 50 MG: 20 INJECTION, SOLUTION INFILTRATION; PERINEURAL at 09:36

## 2024-10-07 RX ADMIN — Medication 1 DROP: at 08:04

## 2024-10-07 RX ADMIN — Medication 1 DROP: at 08:10

## 2024-10-07 RX ADMIN — PROPOFOL 50 MG: 10 INJECTION, EMULSION INTRAVENOUS at 09:36

## 2024-10-07 RX ADMIN — ONDANSETRON 4 MG: 2 INJECTION INTRAMUSCULAR; INTRAVENOUS at 09:49

## 2024-10-07 RX ADMIN — ACETAMINOPHEN 975 MG: 325 TABLET ORAL at 07:56

## 2024-10-07 RX ADMIN — PROPOFOL 20 MG: 10 INJECTION, EMULSION INTRAVENOUS at 09:37

## 2024-10-07 RX ADMIN — Medication 1 DROP: at 07:57

## 2024-10-07 NOTE — DISCHARGE INSTRUCTIONS
[facility]  Home Care Following Anesthesia  For 24 hours after surgery:  Get plenty of rest.  A responsible adult must stay with you for at least 24 hours after you leave the surgery center.  Do not drive or use heavy equipment.  If you have weakness or tingling, don't drive or use heavy equipment until this feeling goes away.   Do not drink alcohol.   Avoid strenuous or risky activities.  Ask for help when climbing stairs.  You may feel lightheaded.  IF so, sit for a few minutes before standing.  Have someone help you get up.   If you have nausea (feel sick to your stomach): Drink only clear liquids such as apple juice, ginger ale, broth or 7-Up.  Rest may also help.  Be sure to drink enough fluids.  Move to a regular diet as you feel able.   You may have a slight fever.  Call the doctor if your fever is over 100 F (37.7 C) (taken under the tongue) or lasts longer than 24 hours.  You may have a dry mouth, a sore throat, muscle aches or trouble sleeping. These should go away after 24 hours.  Do not make important or legal decisions.   It is recommended to avoid smoking.               Tips for taking pain medications  To get the best pain relief possible, remember these points:  Take pain medications as directed, before pain becomes severe.  Pain medication can upset your stomach: taking it with food may help.  Constipation is a common side effect of pain medication. Drink plenty of  fluids.  Eat foods high in fiber. Take a stool softener if recommended by your doctor or pharmacist.  Do not drink alcohol, drive or operate machinery while taking pain medications.  Ask about other ways to control pain, such as with heat, ice or relaxation.    Tylenol/Acetaminophen Consumption    If you feel your pain relief is insufficient, you may take Tylenol/Acetaminophen in addition to your narcotic pain medication.   Be careful not to exceed 4,000 mg of Tylenol/Acetaminophen in a 24 hour  period from all sources.  If you are taking extra strength Tylenol/acetaminophen (500 mg), the maximum dose is 8 tablets in 24 hours.  If you are taking regular strength acetaminophen (325 mg), the maximum dose is 12 tablets in 24 hours.    Call a doctor for any of the following:  Signs of infection (fever, growing tenderness at the surgery site, a large amount of drainage or bleeding, severe pain, foul-smelling drainage, redness, swelling).  It has been over 8 to 10 hours since surgery and you are still not able to urinate (pass water).  Headache for over 24 hours.  Numbness, tingling or weakness the day after surgery (if you had spinal anesthesia).  Signs of Covid-19 infection (temperature over 100 degrees, shortness of breath, cough, loss of taste/smell, generalized body aches, persistent headache, chills, sore throat, nausea/vomiting/diarrhea)  Your doctor is:       Dr. Bry Bar, Ophthalmology: [phone]               Or dial [phone] and ask for the resident on call for:  Ophthalmology  For emergency care, call the:  [facility]:  [phone] (TTY for hearing impaired: [phone])

## 2024-10-07 NOTE — ANESTHESIA POSTPROCEDURE EVALUATION
Patient: Lidia Jenkins    Procedure: Procedure(s):  RIGHT EYE VITRECTOMY, PARS PLANA APPROACH, USING 25-GAUGE INSTRUMENTS, MEMBRANE PEEL, AIR FLUID EXCHANGE       Anesthesia Type:  MAC    Note:  Disposition: Outpatient   Postop Pain Control: Uneventful            Sign Out: Well controlled pain   PONV: No   Neuro/Psych: Uneventful            Sign Out: Acceptable/Baseline neuro status   Airway/Respiratory: Uneventful            Sign Out: Acceptable/Baseline resp. status   CV/Hemodynamics: Uneventful            Sign Out: Acceptable CV status   Other NRE: NONE   DID A NON-ROUTINE EVENT OCCUR? No           Last vitals:  Vitals Value Taken Time   /72 10/07/24 1041   Temp 36.1  C (97  F) 10/07/24 1041   Pulse     Resp 14 10/07/24 1041   SpO2 95 % 10/07/24 1041       Electronically Signed By: Constantino Dixon MD  October 7, 2024  4:22 PM

## 2024-10-07 NOTE — OP NOTE
PRE-OP Dx:    1) right eye epiretinal membrane     Post-OP Dx: same    Attending:  Bry Kerr MD, PhD    Anesthesia: MAC with RB  Procedures:   1) Pars plana vitrectomy (PPV) 25g right eye   2) Membrane stripping and stripping of  internal limiting membrane   3) fluid air exchange      Findings:  epiretinal membrane right eye     EBL: scant  Specimens: none  Complications: none      Procedure Description:    Lidia Jenkins is a AGE: 79 year old year old patient with a history of epiretinal membrane right eye.  After informed consent was obtained, they was brought into the OR where retrobulbar anesthesia was administered though inferior fornix.  The eye was then prepped and draped in the usual fashion for ophthalmic surgery.    Attention was then turned to the vitrectomy.  Marks were made on the sclera inferotemporally, superotemporally, and superonasally 3.5 mm posterior to the limbus.  The 25g transscleral cannulas were inserted through the sclera using the trocars.  The infusion cannula was connected to the inferotemporal trocar and directly visualized to verify it was in the correct location.      A core vitrectomy was performed and the vitreous was stained with kenalog.  The hyaloid was already elevated. Next, membrane blue dye was instilled into the eye to stain the epiretinal and internal limiting membranes.  A sharkskin forceps was used to strip the epiretinal membrane and internal limiting membrane in a broad Grayling around the fovea, at least 1-2 disk diameter away from the fovea.      The periphery was examined with depression 360 with the indirect and cryotherapy. No breaks or tears were found.  Cryotherapy was applied at the sclerotomies prophylactically.  Next a partial fluid-air exchange was done with the vitrector tip and light pipe.  The cannulas were removed and the sclerotomies were air-tight.  The intraocular pressure was appropriate.   Decadron and Ancef were injected subconjunctivally.   A  drop of atropine and maxitrol ointment were placed on the eye.  An eyepad and shield were taped over the eye.    The patient left the OR with no complications.    I was present for the entire surgery.

## 2024-10-08 ENCOUNTER — OFFICE VISIT (OUTPATIENT)
Dept: OPHTHALMOLOGY | Facility: CLINIC | Age: 79
End: 2024-10-08
Attending: OPHTHALMOLOGY
Payer: COMMERCIAL

## 2024-10-08 DIAGNOSIS — Z48.810 AFTERCARE FOLLOWING SURGERY OF A SENSE ORGAN: Primary | ICD-10-CM

## 2024-10-08 PROCEDURE — G0463 HOSPITAL OUTPT CLINIC VISIT: HCPCS | Performed by: OPHTHALMOLOGY

## 2024-10-08 PROCEDURE — 99024 POSTOP FOLLOW-UP VISIT: CPT | Performed by: OPHTHALMOLOGY

## 2024-10-08 RX ORDER — LOVASTATIN 40 MG/1
40 TABLET ORAL AT BEDTIME
Qty: 90 TABLET | Refills: 0 | Status: SHIPPED | OUTPATIENT
Start: 2024-10-08

## 2024-10-08 ASSESSMENT — SLIT LAMP EXAM - LIDS: COMMENTS: NORMAL

## 2024-10-08 ASSESSMENT — REFRACTION_WEARINGRX
OD_CYLINDER: +0.75
SPECS_TYPE: PAL
OS_ADD: +2.50
OD_ADD: +2.50
OS_AXIS: 110
OS_SPHERE: +0.75
OD_AXIS: 120
OS_CYLINDER: +1.00
OD_SPHERE: +1.25

## 2024-10-08 ASSESSMENT — TONOMETRY
OD_IOP_MMHG: 12
OS_IOP_MMHG: 15
IOP_METHOD: TONOPEN

## 2024-10-08 ASSESSMENT — VISUAL ACUITY
METHOD: SNELLEN - LINEAR
OD_SC: 20/40
OS_CC: 20/20

## 2024-10-08 NOTE — NURSING NOTE
Chief Complaints and History of Present Illnesses   Patient presents with    Post Op (Ophthalmology) Right Eye     POD#1 s/p 25G PPV, MEMBRANE PEEL, AIR FLUID EXCHANGE RE 10/07/2024     Chief Complaint(s) and History of Present Illness(es)       Post Op (Ophthalmology) Right Eye              Comments: POD#1 s/p 25G PPV, MEMBRANE PEEL, AIR FLUID EXCHANGE RE 10/07/2024              Comments    Pt slept well last night. No eye pain yesterday or today. No new flashes or floaters. No DM.    GABO Blevins October 8, 2024 7:47 AM

## 2024-10-08 NOTE — TELEPHONE ENCOUNTER
Medication Requested:  lovastatin (MEVACOR) 40 MG tablet 90 tablet 3 10/23/2023 -- No   Sig - Route: Take 1 tablet (40 mg) by mouth at bedtime - Oral     atenolol (TENORMIN) 25 MG tablet 90 tablet 3 9/21/2023 -- No   Sig - Route: TAKE 1 TABLET BY MOUTH DAILY - Oral   Rx'd by Jessica Cheema PA-C   [facility]   ----------------------  Last Office Visit : 9/13/2023  [facility] [place]      Future Office visit:  0  ----------------------      Refill decision: Medication refilled per protocol.    -Pt not seen within past 12 months, no future appointment  -Overdue labs/test:  LDL - needs to be ordered  09/13/2023 105 (H)   - 90 day ravinder refill given      Pass/Fail Protocol Criteria:  LDL Cholesterol Calculated   Date Value Ref Range Status   09/13/2023 105 (H) <=100 mg/dL Final   06/15/2021 119 (H) <100 mg/dL Final     Comment:     Above desirable:  100-129 mg/dl  Borderline High:  130-159 mg/dL  High:             160-189 mg/dL  Very high:       >189 mg/dl       BP Readings from Last 3 Encounters:   10/07/24 128/72   07/29/24 (!) 152/84   09/13/23 (!) 152/74

## 2024-10-09 ENCOUNTER — TELEPHONE (OUTPATIENT)
Dept: INTERNAL MEDICINE | Facility: CLINIC | Age: 79
End: 2024-10-09
Payer: COMMERCIAL

## 2024-10-09 RX ORDER — ATENOLOL 25 MG/1
25 TABLET ORAL DAILY
Qty: 90 TABLET | Refills: 0 | Status: SHIPPED | OUTPATIENT
Start: 2024-10-09

## 2024-10-09 NOTE — TELEPHONE ENCOUNTER
Patient confirmed scheduled appointment:  Date: 12/13/24  Time: 2:30pm  Visit type: [facility]MP PHYSICAL  Provider: PCP    Additional notes: Annual physical/ follow up on med refills per rx auth request

## 2024-10-15 ENCOUNTER — OFFICE VISIT (OUTPATIENT)
Dept: OPHTHALMOLOGY | Facility: CLINIC | Age: 79
End: 2024-10-15
Attending: OPHTHALMOLOGY
Payer: COMMERCIAL

## 2024-10-15 DIAGNOSIS — Z96.1 PSEUDOPHAKIA OF RIGHT EYE: ICD-10-CM

## 2024-10-15 DIAGNOSIS — Z48.810 AFTERCARE FOLLOWING SURGERY OF A SENSE ORGAN: Primary | ICD-10-CM

## 2024-10-15 PROCEDURE — 99024 POSTOP FOLLOW-UP VISIT: CPT | Mod: GC | Performed by: OPHTHALMOLOGY

## 2024-10-15 PROCEDURE — G0463 HOSPITAL OUTPT CLINIC VISIT: HCPCS | Performed by: OPHTHALMOLOGY

## 2024-10-15 ASSESSMENT — SLIT LAMP EXAM - LIDS: COMMENTS: NORMAL

## 2024-10-15 ASSESSMENT — REFRACTION_WEARINGRX
OD_CYLINDER: +0.75
OD_SPHERE: +1.25
OS_ADD: +2.50
OS_CYLINDER: +1.00
OS_SPHERE: +0.75
OS_AXIS: 110
OD_ADD: +2.50
OD_AXIS: 120
SPECS_TYPE: PAL

## 2024-10-15 ASSESSMENT — VISUAL ACUITY
OS_CC: 20/20
OD_SC: 20/40
CORRECTION_TYPE: GLASSES
METHOD: SNELLEN - LINEAR

## 2024-10-15 ASSESSMENT — TONOMETRY
OD_IOP_MMHG: 13
OS_IOP_MMHG: 16
IOP_METHOD: ICARE

## 2024-10-15 NOTE — NURSING NOTE
Chief Complaints and History of Present Illnesses   Patient presents with    Post Op (Ophthalmology) Right Eye     S/p PPV/MP/Afx right eye ERM 10/7/24     Chief Complaint(s) and History of Present Illness(es)       Post Op (Ophthalmology) Right Eye              Comments: S/p PPV/MP/Afx right eye ERM 10/7/24              Comments    States floaters in the right eye   No eye pain    Jessica Gaines COT 7:39 AM October 15, 2024

## 2024-10-15 NOTE — PROGRESS NOTES
Postoperative week 1 status post PPV/MP/Afx right eye ERM 10/7/24    Vision still blurry but improving. (20/80>20/40)  Retina attached  Doing well    Plan:  Shield at night  Retina detachment and endophthalmitis precautions were discussed with the patient and was asked to return if any of the those occur    Medications to operative eye    Taper Prednisolone (white or pink top) 3/day right eye for 1 week with weekly taper   Stop Ofloxacin (tan top) 4/day right eye   Stop maxitrol BID right eye     RTC 11/5/24 for POM1 visit for DFE and OCT right eye only    Hilton Baugh MD  Resident Physician, PGY-3  Department of Ophthalmology       Complete documentation of historical and exam elements from today's encounter can be found in the full encounter summary report (not reduplicated in this progress note). I personally obtained the chief complaint(s) and history of present illness.  I confirmed and edited as necessary the review of systems, past medical/surgical history, family history, social history, and examination findings as documented by others; and I examined the patient myself. I personally reviewed the relevant tests, images, and reports as documented above. I formulated and edited as necessary the assessment and plan and discussed the findings and management plan with the patient and family.    Bry Kerr MD

## 2024-10-22 DIAGNOSIS — H35.371 EPIRETINAL MEMBRANE (ERM) OF RIGHT EYE: Primary | ICD-10-CM

## 2024-11-05 ENCOUNTER — OFFICE VISIT (OUTPATIENT)
Dept: OPHTHALMOLOGY | Facility: CLINIC | Age: 79
End: 2024-11-05
Attending: OPHTHALMOLOGY
Payer: COMMERCIAL

## 2024-11-05 ENCOUNTER — OFFICE VISIT (OUTPATIENT)
Dept: OPHTHALMOLOGY | Facility: CLINIC | Age: 79
End: 2024-11-05
Payer: COMMERCIAL

## 2024-11-05 DIAGNOSIS — H35.371 EPIRETINAL MEMBRANE (ERM) OF RIGHT EYE: ICD-10-CM

## 2024-11-05 DIAGNOSIS — Z48.810 AFTERCARE FOLLOWING SURGERY OF A SENSE ORGAN: Primary | ICD-10-CM

## 2024-11-05 DIAGNOSIS — H25.12 NUCLEAR SCLEROTIC CATARACT OF LEFT EYE: ICD-10-CM

## 2024-11-05 DIAGNOSIS — H25.12 NUCLEAR SCLEROTIC CATARACT OF LEFT EYE: Primary | ICD-10-CM

## 2024-11-05 DIAGNOSIS — H26.491 POSTERIOR CAPSULAR OPACIFICATION, RIGHT EYE: ICD-10-CM

## 2024-11-05 PROCEDURE — 92134 CPTRZ OPH DX IMG PST SGM RTA: CPT | Performed by: OPHTHALMOLOGY

## 2024-11-05 PROCEDURE — 92014 COMPRE OPH EXAM EST PT 1/>: CPT | Mod: 24 | Performed by: OPHTHALMOLOGY

## 2024-11-05 PROCEDURE — G0463 HOSPITAL OUTPT CLINIC VISIT: HCPCS | Performed by: OPHTHALMOLOGY

## 2024-11-05 PROCEDURE — 99024 POSTOP FOLLOW-UP VISIT: CPT | Mod: GC | Performed by: OPHTHALMOLOGY

## 2024-11-05 PROCEDURE — 66821 AFTER CATARACT LASER SURGERY: CPT | Mod: 79 | Performed by: OPHTHALMOLOGY

## 2024-11-05 PROCEDURE — 99207 OCT RETINA SPECTRALIS OD (RIGHT EYE): CPT | Mod: 26 | Performed by: OPHTHALMOLOGY

## 2024-11-05 ASSESSMENT — REFRACTION_MANIFEST
OD_CYLINDER: +0.50
OS_CYLINDER: +0.75
OS_SPHERE: +0.75
OS_AXIS: 098
OD_SPHERE: -0.50
OD_AXIS: 075

## 2024-11-05 ASSESSMENT — VISUAL ACUITY
OD_SC: J16-1
OD_SC: 20/40
METHOD: SNELLEN - LINEAR
OS_BAT_HIGH: 20/60-
OD_PH_SC+: -2
OD_PH_SC+: -2
OD_PH_SC: 20/30
OD_BAT_HIGH: 20/70
METHOD: SNELLEN - LINEAR
OS_CC: 20/20
OD_SC: 20/40
OS_CC: 20/25
OD_PH_SC: 20/30
OS_CC: J1+/-3

## 2024-11-05 ASSESSMENT — EXTERNAL EXAM - LEFT EYE: OS_EXAM: NORMAL

## 2024-11-05 ASSESSMENT — CONF VISUAL FIELD
OD_NORMAL: 1
OD_INFERIOR_TEMPORAL_RESTRICTION: 0
METHOD: COUNTING FINGERS
OS_NORMAL: 1
OD_SUPERIOR_NASAL_RESTRICTION: 0
OD_INFERIOR_NASAL_RESTRICTION: 0
OS_INFERIOR_NASAL_RESTRICTION: 0
OS_SUPERIOR_NASAL_RESTRICTION: 0
OS_INFERIOR_TEMPORAL_RESTRICTION: 0
OS_SUPERIOR_TEMPORAL_RESTRICTION: 0
OD_SUPERIOR_TEMPORAL_RESTRICTION: 0

## 2024-11-05 ASSESSMENT — TONOMETRY
IOP_METHOD: TONOPEN
OD_IOP_MMHG: 13
IOP_METHOD: TONOPEN
OD_IOP_MMHG: 13
OS_IOP_MMHG: 16
OS_IOP_MMHG: 16

## 2024-11-05 ASSESSMENT — CUP TO DISC RATIO
OS_RATIO: 0.25
OD_RATIO: 0.25

## 2024-11-05 ASSESSMENT — EXTERNAL EXAM - RIGHT EYE: OD_EXAM: NORMAL

## 2024-11-05 ASSESSMENT — SLIT LAMP EXAM - LIDS
COMMENTS: NORMAL

## 2024-11-05 ASSESSMENT — REFRACTION_WEARINGRX
OS_AXIS: 110
SPECS_TYPE: PAL
OS_ADD: +2.50
OS_SPHERE: +0.75
OS_CYLINDER: +1.00

## 2024-11-05 NOTE — NURSING NOTE
"Chief Complaints and History of Present Illnesses   Patient presents with    EXAM      Patient reports that she just came from seeing Dr Kerr earlier today and he advised to see Dr. Edouard for left eye cataract evaluation and right eye PCO yag          Chief Complaint(s) and History of Present Illness(es)       EXAM               Comments: Patient reports that she just came from seeing Dr Kerr earlier today and he advised to see Dr. Edouard for left eye cataract evaluation and right eye PCO yag                   Comments    Postoperative month 1 status post PPV/MP/Afx right eye ERM 10/7/24  Hopes to get the \" Same lens for left eye as done with right eye.\".    Patient states vision is blurry with left eye and does have trouble focusing due to glare.     Vision has been blurry with right eye since surgery with the Retinal problems but told that may get some improvement the laser due to possible PCO present with right eye now.     Audra Hernandez, COT COT 1:54 PM November 5, 2024                          "

## 2024-11-05 NOTE — PROGRESS NOTES
Postoperative month 1 status post PPV/MP/Afx right eye ERM 10/7/24    Vision improving mainly notices blur which is much improved. (20/80>20/30)  Retina attached  Doing well    OCT Macula 11/05/2024  Right eye no ERM, foveal irregularity, inferior traction greatly improved ([id number]>[phone])  Left eye no ERM, normal foveal contour     Plan:  Retina detachment and endophthalmitis precautions were discussed with the patient and was asked to return if any of the those occur    Medications to operative eye    Finish taper of Prednisolone (white or pink top) 1/day right eye to complete 1 week total.       RTC POM 3 with Cir[facility]   RTC with Dr. Edouard first available for left eye cataract evaluation and right eye PCO yag      Hilton Baugh MD  Resident Physician, PGY-3  Department of Ophthalmology       Complete documentation of historical and exam elements from today's encounter can be found in the full encounter summary report (not reduplicated in this progress note). I personally obtained the chief complaint(s) and history of present illness.  I confirmed and edited as necessary the review of systems, past medical/surgical history, family history, social history, and examination findings as documented by others; and I examined the patient myself. I personally reviewed the relevant tests, images, and reports as documented above. I formulated and edited as necessary the assessment and plan and discussed the findings and management plan with the patient and family.    Bry Kerr MD

## 2024-11-05 NOTE — NURSING NOTE
Chief Complaints and History of Present Illnesses   Patient presents with    Post Op (Ophthalmology) Right Eye     S/p PPV/MP/Afx right eye ERM 10/7/24     Chief Complaint(s) and History of Present Illness(es)       Post Op (Ophthalmology) Right Eye              Comments: S/p PPV/MP/Afx right eye ERM 10/7/24              Comments    Visions better   No eye pain     Jessica Gaines COT 8:02 AM November 5, 2024

## 2024-11-05 NOTE — PROGRESS NOTES
"CC:  blurry vision both eyes  and glare       HPI:  Lidia Jenkins is here for above.   Glare with light in the dark with driving, notices it happens with both eyes.     HPI       EXAM      Additional comments: Patient reports that she just came from seeing Dr Kerr earlier today and he advised to see Dr. Edouard for left eye cataract evaluation and right eye PCO yag                  Comments    Postoperative month 1 status post PPV/MP/Afx right eye ERM 10/7/24  Hopes to get the \" Same lens for left eye as done with right eye.\".    Patient states vision is blurry with left eye and does have trouble focusing due to glare.     Vision has been blurry with right eye since surgery with the Retinal problems but told that may get some improvement the laser due to possible PCO present with right eye now.     DOMINGA Bland COT 1:54 PM November 5, 2024                 Last edited by Audra Hernandez COT on 11/5/2024  1:54 PM.        POH:  Postoperative month 1 status post PPV/MP/Afx right eye ERM 10/7/24   Glasses for hypermetropia/presbyopia, cataract left eye, no surgery left eye, no trauma    Assessment/Plan:  1. Nuclear sclerotic cataract of left eye - Left Eye    2. Posterior capsular opacification, right eye - Right Eye    3. Epiretinal membrane (ERM) of right eye      Nuclear sclerotic cataract left eye-   Comment:   The patient was advised that the current significant symptoms of blurred vision and glare is primarily secondary to cataract.  The risks, benefits, and alternatives to cataract surgery with intraocular lens implant were discussed at length, and informed consent was obtained.  The patient will schedule cataract surgery in the near future.  Pre-operative measurements were reviewed from the IOL Master to plan appropriate lens power and lens implant options were discussed including monofocal versus multifocal, toric for astigmatism, monovision, and refractive aim. The patient was advised of the " necessity for a pre-operative physical with their primary physician.  The patient was also asked to arrange to have someone at home when returning from surgery.    Plan:  Phacoemulsification with intraocular lens implant left eye       Surgical plan:  Best corrected visual acuity today is 20/25 bat 20/60  1 +NSC    1+PSC    Special equipment/needs:    Anesthesia: MAC/Topical  Able to lie flat:  Yes  Dilation: Moderate   6.5mm  Alpha blocker/Flomax: No  Malyugen/Iris expansion: Not needed  Anticoagulants: No  Guttata: No  Diabetes: No  Pseudoexfoliation: No pseudoexfoliation  Trauma: No  Trypan Blue: No  Refractive goal: plano    Cylinder:  declines toric  Post op day 1  pm     (H26.491) Posterior capsular opacification visually significant, Right Eye   Comment: visually significant, may have limited acuity due to prior epiretinal membrane (status post PPV/MP)  The risks, benefits and alternatives were discussed with patient.  Consent was signed by patient.  The patient tolerated the procedure well, and there were no apparent complications. See procedure note.    Plan: call immediately if flashes/floaters, worsening vision or eye pain occur    Epiretinal membrane right eye   Comment: Following with Dr. Kerr, doing well   Plan continue follow-up as recommended  Followup:  Return in about 2 months (around 1/5/2025) for post-op cataract sx, POD  1  OS.        Complete documentation of historical and exam elements from today's encounter can be found in the full encounter summary report (not reduplicated in this progress note). I personally obtained the chief complaint(s) and history of present illness.  I have confirmed and edited as necessary the CC, HPI, PMH/PSH, social history, FMH, ROS, and exam/neuro findings as obtained by the technician or others. I have examined this patient myself and I personally viewed the image(s) and studies listed above and the documentation reflects my findings and interpretation.  I  formulated and edited as necessary the assessment and plan and discussed the findings and management plan with the patient and family.     Whitney Edouard MD     91

## 2024-11-06 ENCOUNTER — TELEPHONE (OUTPATIENT)
Dept: OPHTHALMOLOGY | Facility: CLINIC | Age: 79
End: 2024-11-06
Payer: COMMERCIAL

## 2024-11-06 ENCOUNTER — HOSPITAL ENCOUNTER (OUTPATIENT)
Facility: AMBULATORY SURGERY CENTER | Age: 79
End: 2024-11-06
Attending: OPHTHALMOLOGY
Payer: COMMERCIAL

## 2024-11-06 PROBLEM — H25.12 NUCLEAR SCLEROTIC CATARACT OF LEFT EYE: Status: ACTIVE | Noted: 2024-11-05

## 2024-11-06 NOTE — TELEPHONE ENCOUNTER
Called patient to schedule surgery with Dr. Edouard     Spoke with: Patient (Lidia)     Date(s) of Surgery: 12/09/2024, offered 12/2, patient declined     Patient aware of approximate arrival time: No - patient states that she prefers early morning. She states last eye surgery was early morning with Dr. Edouard. Informed her unforunately, we are unable to do mornings as Dr. Edouard is only in the afternoon     Location of surgery: [facility]     Pre-Op H&P: PAC  11/25/2024 at 700am virtual      Informed patient that they need to call to schedule pre-op H&P within 30 days of surgery date: Yes      Post-Op Appt Dates: Knows someone will be contacting her to schedule her 1 day, 1 week post-ops.        Discussed with patient PAC RN will provide arrival time and instructions for surgery at the time of the appointment: [[place] locations only]: Yes      Standard Surgery Packet Sent: Yes 11/06/24  via Mail - Standard      Additional Information Sent in Packet:  None. Will send separate       Informed patient that they will need an adult  to bring patient home from surgery: Patient aware        Additional Comments:    All patients questions were answered and was instructed to review surgical packet and call back [phone] with any questions or concerns.       Jennifer Jenkins on 11/6/2024 at 10:26 AM

## 2024-11-07 NOTE — TELEPHONE ENCOUNTER
FUTURE VISIT INFORMATION      SURGERY INFORMATION:  Date: 12/9/24  Location:  or  Surgeon:  Whitney Edouard MD   Anesthesia Type:  MAC with Topical   Procedure: PHACOEMULSIFICATION, CATARACT, WITH STANDARD INTRAOCULAR LENS IMPLANT INSERTION   Consult: ov 11/5/24    RECORDS REQUESTED FROM:         Primary Care Provider: [facility]     Pertinent Medical History: hypertension

## 2024-11-08 NOTE — TELEPHONE ENCOUNTER
Phone call to patient to schedule post op appointment     Packet sent via [employer]         12/10  12/17    Aureliano Monteiro on 11/8/2024 at 12:34 PM

## 2024-11-24 ENCOUNTER — HEALTH MAINTENANCE LETTER (OUTPATIENT)
Age: 79
End: 2024-11-24

## 2024-11-25 ENCOUNTER — VIRTUAL VISIT (OUTPATIENT)
Dept: SURGERY | Facility: CLINIC | Age: 79
End: 2024-11-25
Payer: COMMERCIAL

## 2024-11-25 ENCOUNTER — ANESTHESIA EVENT (OUTPATIENT)
Dept: SURGERY | Facility: AMBULATORY SURGERY CENTER | Age: 79
End: 2024-11-25
Payer: COMMERCIAL

## 2024-11-25 ENCOUNTER — PRE VISIT (OUTPATIENT)
Dept: SURGERY | Facility: CLINIC | Age: 79
End: 2024-11-25

## 2024-11-25 VITALS — BODY MASS INDEX: 28.17 KG/M2 | WEIGHT: 165 LBS | HEIGHT: 64 IN

## 2024-11-25 DIAGNOSIS — Z01.818 PRE-OP EVALUATION: Primary | ICD-10-CM

## 2024-11-25 DIAGNOSIS — H25.12 NUCLEAR SCLEROTIC CATARACT OF LEFT EYE: ICD-10-CM

## 2024-11-25 ASSESSMENT — ENCOUNTER SYMPTOMS
SEIZURES: 0
DYSRHYTHMIAS: 0

## 2024-11-25 ASSESSMENT — LIFESTYLE VARIABLES: TOBACCO_USE: 0

## 2024-11-25 NOTE — PATIENT INSTRUCTIONS
Preparing for Your Surgery      Name:  Lidia Jenkins   MRN:  [id number]   :  1945   Today's Date:  2024         Arriving for surgery:  Surgery date:  24  Arrival time:  12:45 pm  Surgery time: 2:15 pm    Restrictions due to COVID 19:    Please maintain social distance.  Masking is optional        parking is available for anyone with mobility limitations or disabilities. (Monday- Friday 7 am- 5 pm)    Please come to:    [facility]  [address] [place] [id number]    Check in on the 5th floor, Ambulatory Surgery Center.    What can I eat or drink?    -  You may eat and drink normally until 8 hours before arrival time  (Until 4:45 am on 24)  -  You may have clear liquids until 2 hours before arrival time  (Until 10:45 am on 24)    Examples of clear liquids:  Water  Clear broth  Juices (apple, white grape, white cranberry  and cider) without pulp  Noncarbonated, powder based beverages  (lemonade and Von-Aid)  Sodas (Sprite, 7-Up, ginger ale and seltzer)  Coffee or tea (without milk or cream)  Gatorade    --No alcohol or cannabis products for at least 24 hours before surgery    Which medicines can I take?      Hold Supplements for 7 days before surgery. Vitamin E and Glucosamine Chondroitin    Hold Ibuprofen (Advil, Motrin) for 1 day before surgery--unless otherwise directed by surgeon.  Hold Naproxen (Aleve) for 4 days before surgery.    Take other medications as prescribed      How do I prepare myself?  - Please take 2 showers (one the night prior to surgery and one the morning of surgery) using Scrubcare or Hibiclens soap.    Use this soap only from the neck to your toes. Avoid genital area      Leave the soap on your skin for one minute--then rinse thoroughly.      You may use your own shampoo and conditioner; no other hair products.   - Please remove all jewelry and body piercings.  - No lotions, deodorants or fragrance.  - No makeup or  fingernail polish.   - Bring your ID and insurance card.    -If you have a Deep Brain Stimulator, a Spinal Cord Stimulator or any implanted Neuro device you must bring the remote to the Surgery Center          ALL PATIENTS ARE REQUIRED TO HAVE A RESPONSIBLE ADULT TO DRIVE AND BE IN ATTENDANCE WITH THEM FOR 24 HOURS FOLLOWING SURGERY       Covid testing policy as of 12/06/2022  Your surgeon will notify and schedule you for a COVID test if one is needed before surgery--please direct any questions or COVID symptoms to your surgeon      Questions or Concerns:    -For questions regarding the day of surgery please contact the Ambulatory Surgery Center at [phone].    -If you have health changes between today and your surgery please contact your surgeon.     For questions after surgery please call your surgeons office.

## 2024-11-25 NOTE — H&P
Pre-Operative H & P     CC:  Preoperative exam to assess for increased cardiopulmonary risk while undergoing surgery and anesthesia.    Date of Encounter: 11/25/2024  Primary Care Physician:  Alejandro Serrano     Reason for visit:   Encounter Diagnoses   Name Primary?    Pre-op evaluation Yes    Nuclear sclerotic cataract of left eye        HPI  Lidia Jenkins is a 79 year old female who presents for pre-operative H & P in preparation for  Procedure Information       Case: [id number] Date/Time: 12/09/24 1415    Procedure: LEFT EYE PHACOEMULSIFICATION, CATARACT, WITH STANDARD INTRAOCULAR LENS IMPLANT INSERTION (Left: Eye)    Anesthesia type: MAC with Topical    Diagnosis: Nuclear sclerotic cataract of left eye [H25.12]    Pre-op diagnosis: Nuclear sclerotic cataract of left eye [H25.12]    Location: [place] [address] / [facility]    Providers: Whitney Edouard MD            The patient presents to the PAC virtually today in preparation for the above scheduled procedure with comorbid conditions including HTN, HLD, osteoarthritis and h/o BCC.     The patient was seen by Dr. Edouard in ophthalmology for evaluation of blurry vision and glare in both eyes.  The patient was found to have posterior capsular opacification and Epiretinal membrane (ERM) of right eye.  She has nuclear sclerotic cataract of left eye.  The patient is s/p PPV/MP/Afx right eye ERM 10/7/24.  The patient as now been scheduled for the procedure as listed above to treat her    nuclear sclerotic cataract of left eye    History is obtained from the patient and chart review    Hx of abnormal bleeding or anti-platelet use: denies    Menstrual history: No LMP recorded. Patient is postmenopausal.:      Past Medical History  Past Medical History:   Diagnosis Date    Arthritis     Basal cell carcinoma     Cataract     Epiretinal membrane (ERM) of right eye     Hyperlipidaemia LDL goal < 100      Hyperlipidemia     Hypertension goal BP (blood pressure) < 140/90     Psoriasis        Past Surgical History  Past Surgical History:   Procedure Laterality Date    ABDOMEN SURGERY  1987 COLONOSCOPY 2012    PHACOEMULSIFICATION WITH STANDARD INTRAOCULAR LENS IMPLANT Right 7/29/2024    Procedure: RIGHT EYE PHACOEMULSIFICATION, CATARACT, WITH STANDARD INTRAOCULAR LENS IMPLANT INSERTION;  Surgeon: Whitney Edouard MD;  Location: [facility] OR    RELEASE TRIGGER FINGER Left 8/15/2019    Procedure: Left Middle Finger Trigger Release;  Surgeon: Castro Navarro MD;  Location: [facility] OR    VITRECTOMY PARSPLANA WITH 25 GAUGE SYSTEM Right 10/7/2024    Procedure: RIGHT EYE VITRECTOMY, PARS PLANA APPROACH, USING 25-GAUGE INSTRUMENTS, MEMBRANE PEEL, AIR FLUID EXCHANGE;  Surgeon: Bry Jackman MD;  [place] [facility] [place]       Prior to Admission Medications  Current Outpatient Medications   Medication Sig Dispense Refill    atenolol (TENORMIN) 25 MG tablet TAKE 1 TABLET(25 MG) BY MOUTH DAILY (Patient taking differently: Take 25 mg by mouth every evening.) 90 tablet 0    calcium citrate-vitamin D (CITRACAL) 200-6.25 MG-MCG TABS per tablet Take 1 tablet by mouth every evening.      cholecalciferol (VITAMIN D3) 25 mcg (1000 units) capsule Take 1 capsule by mouth every evening.      glucosamine-chondroitin 500-400 MG CAPS per capsule Take 1 capsule by mouth every evening      lovastatin (MEVACOR) 40 MG tablet Take 1 tablet (40 mg) by mouth at bedtime. **Due for visit and labs, needed for further refills. Please schedule via [employer] or call the clinic** 90 tablet 0    vitamin E (TOCOPHEROL) 400 units (180 mg) capsule Take 400 Units by mouth every evening      ketoconazole (NIZORAL) 2 % external cream Apply topically daily Use as needed for itchy areas on the face and ears 60 g 4    ofloxacin (OCUFLOX) 0.3 % ophthalmic solution Apply 1 drop to eye 4 times daily. Instill into operative eye(s) per physician  instructions. (Patient not taking: Reported on 11/5/2024) 5 mL 0    prednisoLONE acetate (PRED FORTE) 1 % ophthalmic suspension Apply 1 drop to eye 4 times daily. Instill into operative eye(s) per physician instructions. (Patient not taking: Reported on 11/5/2024) 5 mL 1       Allergies  No Known Allergies    Social History  Social History     Socioeconomic History    Marital status: Single     Spouse name: Not on file    Number of children: Not on file    Years of education: Not on file    Highest education level: Not on file   Occupational History    Not on file   Tobacco Use    Smoking status: Never     Passive exposure: Never    Smokeless tobacco: Never   Substance and Sexual Activity    Alcohol use: Yes     Alcohol/week: 3.0 standard drinks of alcohol     Types: 3 Standard drinks or equivalent per week     Comment: 1-3 glasses of wine a week    Drug use: No    Sexual activity: Not Currently   Other Topics Concern    Parent/sibling w/ CABG, MI or angioplasty before 65F 55M? Yes     Comment: father, brother   Social History Narrative    Not on file     Social Drivers of Health     Financial Resource Strain: Not on file   Food Insecurity: Not on file   Transportation Needs: Not on file   Physical Activity: Not on file   Stress: Not on file   Social Connections: Not on file   Interpersonal Safety: Low Risk  (10/7/2024)    Interpersonal Safety     Do you feel physically and emotionally safe where you currently live?: Yes     Within the past 12 months, have you been hit, slapped, kicked or otherwise physically hurt by someone?: No     Within the past 12 months, have you been humiliated or emotionally abused in other ways by your partner or ex-partner?: No   Housing Stability: Not on file       Family History  Family History   Problem Relation Age of Onset    Hypertension Mother     Hyperlipidemia Mother     Depression Mother     Coronary Artery Disease Father         MI    Hypertension Father     Hyperlipidemia  Father     Substance Abuse Father     Heart Failure Father     Hypertension Sister     Hyperlipidemia Sister     Hypertension Sister     Hyperlipidemia Sister     Depression Sister     Substance Abuse Sister     Hypertension Brother     Heart Disease Brother     Hyperlipidemia Brother     Coronary Artery Disease Brother     Hypertension Brother     Substance Abuse Brother     Hyperlipidemia Brother     Other Cancer No family hx of     Melanoma No family hx of     Skin Cancer No family hx of     Macular Degeneration No family hx of     Glaucoma No family hx of     Anesthesia Reaction No family hx of     Venous thrombosis No family hx of        Review of Systems  The complete review of systems is negative other than noted in the HPI or here.   Anesthesia Evaluation   Pt has had prior anesthetic. Type: MAC and General.    No history of anesthetic complications       ROS/MED HX  ENT/Pulmonary:     (+)     DWAYNE risk factors, snores loudly, hypertension,                              (-) tobacco use, asthma and recent URI   Neurologic:  - neg neurologic ROS  (-) no seizures and no CVA   Cardiovascular: Comment: Denies cardiac symptoms including chest pain, SOB, palpitations, syncope, MEJÍA, orthopnea, or PND.       (+) Dyslipidemia hypertension-range: Home monitoring 130-140/80s/ -   -  - -                                 Previous cardiac testing   Echo: Date: Results:    Stress Test:  Date: Results:    ECG Reviewed:  Date: 2015 Results:  SR, LAD, LAFB, no change from previous  Cath:  Date: Results:   (-) taking anticoagulants/antiplatelets, MEJÍA and arrhythmias   METS/Exercise Tolerance: >4 METS Comment: Silver sneakers, walking 2 miles daily, gardening    Hematologic:  - neg hematologic  ROS  (-) history of blood clots, anemia and history of blood transfusion   Musculoskeletal:  - neg musculoskeletal ROS     GI/Hepatic: Comment: Occasional GERD symptoms, with no specific meds    (+) GERD, Other,                   Renal/Genitourinary:  - neg Renal ROS     Endo:  - neg endo ROS     Psychiatric/Substance Use:  - neg psychiatric ROS     Infectious Disease:  - neg infectious disease ROS     Malignancy: Comment: BCC  (+) Malignancy, History of Skin.Skin CA Remission status post Surgery.      Other:  - neg other ROS          Virtual visit -  No vitals were obtained    Physical Exam  Constitutional: Awake, alert, cooperative, no apparent distress, and appears stated age.  Eyes: Pupils equal  HENT: Normocephalic  Respiratory: non labored breathing   Neurologic: Awake, alert, oriented to name, place and time.   Neuropsychiatric: Calm, cooperative. Normal affect.      Prior Labs/Diagnostic Studies   All labs and imaging personally reviewed    Latest Reference Range & Units 09/13/23 14:19   Sodium 136 - 145 mmol/L 141   Potassium 3.4 - 5.3 mmol/L 4.8   Chloride 98 - 107 mmol/L 106   Carbon Dioxide (CO2) 22 - 29 mmol/L 25   Urea Nitrogen 8.0 - 23.0 mg/dL 26.0 (H)   Creatinine 0.51 - 0.95 mg/dL 0.84   GFR Estimate >60 mL/min/1.73m2 71   Calcium 8.8 - 10.2 mg/dL 9.9   Anion Gap 7 - 15 mmol/L 10   Magnesium 1.7 - 2.3 mg/dL 2.7 (H)   Albumin 3.5 - 5.2 g/dL 4.6   Protein Total 6.4 - 8.3 g/dL 7.2   Alkaline Phosphatase 35 - 104 U/L 52   ALT 0 - 50 U/L 18   AST 0 - 45 U/L 27   Bilirubin Total <=1.2 mg/dL 0.3   Cholesterol <200 mg/dL 195   Ferritin 11 - 328 ng/mL 109   Folate 4.6 - 34.8 ng/mL 16.9   Glucose 70 - 99 mg/dL 92   HDL Cholesterol >=50 mg/dL 74   Iron 37 - 145 ug/dL 43   Iron Binding Capacity 240 - 430 ug/dL 293   Iron Sat Index 15 - 46 % 15   LDL Cholesterol Calculated <=100 mg/dL 105 (H)   Non HDL Cholesterol <130 mg/dL 121   Triglycerides <150 mg/dL 78   TSH 0.30 - 4.20 uIU/mL 3.62   WBC 4.0 - 11.0 10e3/uL 5.8   Hemoglobin 11.7 - 15.7 g/dL 12.9   Hematocrit 35.0 - 47.0 % 39.0   Platelet Count 150 - 450 10e3/uL 233   RBC Count 3.80 - 5.20 10e6/uL 4.36   MCV 78 - 100 fL 89   MCH 26.5 - 33.0 pg 29.6   MCHC 31.5 - 36.5 g/dL 33.1    RDW 10.0 - 15.0 % 13.2   (H): Data is abnormally high  EKG/ stress test - if available please see in ROS above   No results found.       No data to display                  The patient's records and results personally reviewed by this provider.     Outside records reviewed from: Care Everywhere      Assessment    Lidia Jenkins is a 79 year old female seen as a PAC referral for risk assessment and optimization for anesthesia.    Plan/Recommendations  Pt will be optimized for the proposed procedure.  See below for details on the assessment, risk, and preoperative recommendations    NEUROLOGY  - No history of TIA, CVA or seizure    -Post Op delirium risk factors:  Age    ENT  - Nuclear sclerotic cataract of left eye  Above procedure scheduled    - Posterior capsular opacification and Epiretinal membrane (ERM) of right eye s/p PPV/MP/Afx right eye ERM 10/7/24.      - No current airway concerns.  Will need to be reassessed day of surgery.  Mallampati: Unable to assess  TM: Unable to assess    CARDIAC  - No history of CAD and Afib    -  Denies cardiac symptoms.    - HTN stable on amlodipine  Continue DOS    - HLD managed with statin   Continue DOS    - METS (Metabolic Equivalents)  Patient performs 4 or more METS exercise without symptoms             Total Score: 0      RCRI-Very low risk: Class 1 0.4% complication rate             Total Score: 0        PULMONARY  - DWAYNE Medium Risk             Total Score: 3    DWAYNE: Snores loudly    DWAYNE: Hypertension    DWAYNE: Over 50 ys old      - Denies asthma or inhaler use    - Tobacco History    History   Smoking Status    Never   Smokeless Tobacco    Never       GI  - Rare occurrence of intermittent mild GERD symptoms.  Does not usually take anything.     PONV Medium Risk  Total Score: 2           1 AN PONV: Pt is Female    1 AN PONV: Patient is not a current smoker        /RENAL  - Baseline Creatinine  see above     ENDOCRINE    - BMI: Estimated body mass index is 28.32 kg/m  as  "calculated from the following:    Height as of this encounter: 1.626 m (5' 4\").    Weight as of this encounter: 74.8 kg (165 lb).  Obesity (BMI >30)  - No history of Diabetes Mellitus    HEME  VTE Low Risk 0.26%             Total Score: 1    VTE: Greater than 59 yrs old      - No history of abnormal bleeding or antiplatelet use.    - Denies a h/o anemia or previous blood transfusion      MSK  - Osteoarthritis in thumb    Patient is NOT Frail             Total Score: 0          Different anesthesia methods/types have been discussed with the patient, but they are aware that the final plan will be decided by the assigned anesthesia provider on the date of service.    The patient is optimized for their procedure. AVS with information on surgery time/arrival time, meds and NPO status given by nursing staff. No further diagnostic testing indicated.    Please refer to the physical examination documented by the anesthesiologist in the anesthesia record on the day of surgery.    Video-Visit Details    Type of service:  Video Visit    Provider received verbal consent for a Video Visit from the patient? Yes   Video Start Time: 7:05  Video End Time:7:12    Originating Location (pt. Location): Home    Distant Location (provider location):  Off-site  Mode of Communication:  Video Conference via [facility]  On the day of service:     Prep time: 10 minutes  Visit time: 7 minutes  Documentation time: 9 minutes  ------------------------------------------  Total time: 26 minutes      TIFFANY Natarajan CNP  Preoperative Assessment Center  [facility]  Clinic and Surgery Center  Phone: [phone]  Fax: [phone]    "

## 2024-11-25 NOTE — PROGRESS NOTES
Lidia is a 79 year old who is being evaluated via a billable video visit.      How would you like to obtain your AVS? MyChart          Subjective   Lidia is a 79 year old, presenting for the following health issues:  Pre-Op Exam          TELMA Moy LPN

## 2024-12-04 ENCOUNTER — OFFICE VISIT (OUTPATIENT)
Dept: DERMATOLOGY | Facility: CLINIC | Age: 79
End: 2024-12-04
Attending: DERMATOLOGY
Payer: COMMERCIAL

## 2024-12-04 DIAGNOSIS — L21.9 DERMATITIS, SEBORRHEIC: Primary | ICD-10-CM

## 2024-12-04 DIAGNOSIS — L82.0 INFLAMED SEBORRHEIC KERATOSIS: ICD-10-CM

## 2024-12-04 DIAGNOSIS — D22.9 MULTIPLE PIGMENTED NEVI: ICD-10-CM

## 2024-12-04 DIAGNOSIS — D18.01 CHERRY ANGIOMA: ICD-10-CM

## 2024-12-04 DIAGNOSIS — Z85.828 HISTORY OF BASAL CELL CARCINOMA: ICD-10-CM

## 2024-12-04 PROCEDURE — 17110 DESTRUCTION B9 LES UP TO 14: CPT | Performed by: DERMATOLOGY

## 2024-12-04 PROCEDURE — 99213 OFFICE O/P EST LOW 20 MIN: CPT | Mod: 25 | Performed by: DERMATOLOGY

## 2024-12-04 ASSESSMENT — PAIN SCALES - GENERAL: PAINLEVEL_OUTOF10: NO PAIN (0)

## 2024-12-04 NOTE — NURSING NOTE
Dermatology Rooming Note    Lidia Jenkins's goals for this visit include:   Chief Complaint   Patient presents with    Skin Check     FBSE- Spot on the right temple, back of neck, left leg, and some bite like patterns on the right leg and down by ankle.      Mau Mahan, EMT  Clinic Support  [facility]     [phone]    Employed by [employer]

## 2024-12-04 NOTE — PROGRESS NOTES
[facility] Dermatology Note  Encounter Date: Dec 4, 2024  Office Visit     Dermatology Problem List:  1. Psoriasis  2. History of NMSC,  - BCC, back and right patella  3. SK: LN2 preformed 12/11/23 (Inflamed), 12/04/2024  4. Seborrheic Dermatitis - eyebrows and ears   - Current treatment: Ketoconazole cream BID PRN, anti-dandruff shampoo PRN  ____________________________________________    Assessment & Plan:  #Skin cancer screening with multiple benign findings including seborrheic keratosis, solar lentigines, multiple pigmented nevi, and cherry angiomas   - ABCDEs: Counseled ABCDEs of melanoma: Asymmetry, Border (irregularity), Color (not uniform, changes in color), Diameter (greater than 6 mm which is about the size of a pencil eraser), and Evolving (any changes in preexisting moles).  - Sun protection: Counseled SPF30+ sunscreen, UPF clothing, sun avoidance, tanning bed avoidance.      # History of nonmelanoma skin cancer, no clincial evidence of recurrence.   -Recommend annual screenings  -We discussed sun protection.     # Seborrheic keratosis, inflamed, of right temple and right occipital hairline.  - Cryotherapy performed today (see procedure note(s) below).    # Arthropod bites  Patient reports recurring pruritic papules on extremities consistent with arthropod bites that develop surrounding erythema and resolve within two weeks.  History of gardening and working outdoors with bites spanning from summer to fall.  - OTC hydrocortisone cream, PRN    Procedures Performed:   - Cryotherapy procedure note, location(s): right temple and right occipital hairline. After verbal consent and discussion of risks and benefits including, but not limited to, dyspigmentation/scar, blister, and pain, 2 lesion(s) was(were) treated with 1-2 mm freeze border for 1-2 cycles with liquid nitrogen. Post cryotherapy instructions were provided.  - Procedure(s) performed by faculty.     Follow-up: 1 year(s)  in-person, or earlier for new or changing lesions    Staff and Medical Student:     Christoph Costello  MS3 - Medical Student    I was present with the medical student who participated in the service and in the documentation of the note. I have verified the history and personally performed the physical exam and medical decision making. I agree with the assessment and plan of care as documented in the note.  Bettina Grady MD   ____________________________________________    CC: Skin Check (FBSE- Spot on the right temple, back of neck, left leg, and some bite like patterns on the right leg and down by ankle. )    HPI:  Ms. Lidia Jenkins is a(n) 79 year old female who presents today as a return patient for FBSE and a history of recurring intensely pruritic papules. Lidia reports recurring pruritic papules on extremities (inner knee, lower shin, and inner elbow) that developed surrounding erythema and resolve within two weeks. She reports that they started in the summer and continued until the fall. She also reports a history of gardening and working outdoors.  She does have a cat that has not been treated with flee protection, but it does not go outside.  Lidia also noted two bothersome spots on her right temple and her right occipital hairline.    Patient is otherwise feeling well, without additional skin concerns.    Labs:  None reviewed.    Physical Exam:  Vitals: There were no vitals taken for this visit.  SKIN: Total skin excluding the undergarment areas was performed. The exam included the head/face, neck, both arms, chest, back, abdomen, both legs, buttocks, digits and/or nails.   - Dome shaped bright red papules on the trunk..   - Multiple regular tan and brown pigmented macules and papules on the face, trunk and extremities.   - Scattered brown macules on sun exposed areas, diffusely.  - Waxy well-demarcated stuck on appearing white, tan and brown papules on the face including right temple and right lower  eyelid, right occipital hairline, trunk and extremities.  - No other lesions of concern on areas examined.       Medications:  Current Outpatient Medications   Medication Sig Dispense Refill    atenolol (TENORMIN) 25 MG tablet TAKE 1 TABLET(25 MG) BY MOUTH DAILY (Patient taking differently: Take 25 mg by mouth every evening.) 90 tablet 0    calcium citrate-vitamin D (CITRACAL) 200-6.25 MG-MCG TABS per tablet Take 1 tablet by mouth every evening.      cholecalciferol (VITAMIN D3) 25 mcg (1000 units) capsule Take 1 capsule by mouth every evening.      glucosamine-chondroitin 500-400 MG CAPS per capsule Take 1 capsule by mouth every evening      ketoconazole (NIZORAL) 2 % external cream Apply topically daily Use as needed for itchy areas on the face and ears 60 g 4    ketorolac tromethamine (ACULAR-LS) 0.4 % SOLN ophthalmic solution Operated eye: Start same day as surgery 1 drop four times a day for 1 week, then twice a day for 1 week, then once a day for 1 week, then stop. 5 mL 1    lovastatin (MEVACOR) 40 MG tablet Take 1 tablet (40 mg) by mouth at bedtime. **Due for visit and labs, needed for further refills. Please schedule via [employer] or call the clinic** 90 tablet 0    prednisoLONE acetate (PRED FORTE) 1 % ophthalmic suspension Operated eye: Start the same day of surgery 1 drop four times a day for 1 week, then twice a day for 1 week, then once a day for 1 week, then stop 5 mL 1    vitamin E (TOCOPHEROL) 400 units (180 mg) capsule Take 400 Units by mouth every evening      ofloxacin (OCUFLOX) 0.3 % ophthalmic solution Apply 1 drop to eye 4 times daily. Instill into operative eye(s) per physician instructions. (Patient not taking: Reported on 11/5/2024) 5 mL 0    prednisoLONE acetate (PRED FORTE) 1 % ophthalmic suspension Apply 1 drop to eye 4 times daily. Instill into operative eye(s) per physician instructions. (Patient not taking: Reported on 11/5/2024) 5 mL 1     No current facility-administered medications  for this visit.      Past Medical History:   Patient Active Problem List   Diagnosis    Hypertension goal BP (blood pressure) < 140/90    History of basal cell carcinoma    Advance care planning    Nuclear sclerotic cataract of both eyes    Epiretinal membrane (ERM) of right eye    Nuclear sclerotic cataract of left eye     Past Medical History:   Diagnosis Date    Arthritis     Basal cell carcinoma     Cataract     Epiretinal membrane (ERM) of right eye     Hyperlipidaemia LDL goal < 100     Hyperlipidemia     Hypertension goal BP (blood pressure) < 140/90     Psoriasis         CC Bettina Grady MD  [address]  [place] [address] on close of this encounter.

## 2024-12-04 NOTE — LETTER
12/4/2024       RE: Lidia Jenkins  [address] [facility] [address]     Dear Colleague,    Thank you for referring your patient, Lidia Jenkins, to the [facility] DERMATOLOG[facility] [place] at [facility]. Please see a copy of my visit note below.    Formerly [facility] Dermatology Note  Encounter Date: Dec 4, 2024  Office Visit     Dermatology Problem List:  1. Psoriasis  2. History of NMSC,  - BCC, back and right patella  3. SK: LN2 preformed 12/11/23 (Inflamed), 12/04/2024  4. Seborrheic Dermatitis - eyebrows and ears   - Current treatment: Ketoconazole cream BID PRN, anti-dandruff shampoo PRN  ____________________________________________    Assessment & Plan:  #Skin cancer screening with multiple benign findings including seborrheic keratosis, solar lentigines, multiple pigmented nevi, and cherry angiomas   - ABCDEs: Counseled ABCDEs of melanoma: Asymmetry, Border (irregularity), Color (not uniform, changes in color), Diameter (greater than 6 mm which is about the size of a pencil eraser), and Evolving (any changes in preexisting moles).  - Sun protection: Counseled SPF30+ sunscreen, UPF clothing, sun avoidance, tanning bed avoidance.      # History of nonmelanoma skin cancer, no clincial evidence of recurrence.   -Recommend annual screenings  -We discussed sun protection.     # Seborrheic keratosis, inflamed, of right temple and right occipital hairline.  - Cryotherapy performed today (see procedure note(s) below).    # Arthropod bites  Patient reports recurring pruritic papules on extremities consistent with arthropod bites that develop surrounding erythema and resolve within two weeks.  History of gardening and working outdoors with bites spanning from summer to fall.  - OTC hydrocortisone cream, PRN    Procedures Performed:   - Cryotherapy procedure note, location(s): right temple and right occipital hairline. After verbal  consent and discussion of risks and benefits including, but not limited to, dyspigmentation/scar, blister, and pain, 2 lesion(s) was(were) treated with 1-2 mm freeze border for 1-2 cycles with liquid nitrogen. Post cryotherapy instructions were provided.  - Procedure(s) performed by faculty.     Follow-up: 1 year(s) in-person, or earlier for new or changing lesions    Staff and Medical Student:     Christoph Costello  MS3 - Medical Student    I was present with the medical student who participated in the service and in the documentation of the note. I have verified the history and personally performed the physical exam and medical decision making. I agree with the assessment and plan of care as documented in the note.  Bettina Grady MD   ____________________________________________    CC: Skin Check (FBSE- Spot on the right temple, back of neck, left leg, and some bite like patterns on the right leg and down by ankle. )    HPI:  Ms. Lidia Jenkins is a(n) 79 year old female who presents today as a return patient for FBSE and a history of recurring intensely pruritic papules. Lidia reports recurring pruritic papules on extremities (inner knee, lower shin, and inner elbow) that developed surrounding erythema and resolve within two weeks. She reports that they started in the summer and continued until the fall. She also reports a history of gardening and working outdoors.  She does have a cat that has not been treated with flee protection, but it does not go outside.  Lidia also noted two bothersome spots on her right temple and her right occipital hairline.    Patient is otherwise feeling well, without additional skin concerns.    Labs:  None reviewed.    Physical Exam:  Vitals: There were no vitals taken for this visit.  SKIN: Total skin excluding the undergarment areas was performed. The exam included the head/face, neck, both arms, chest, back, abdomen, both legs, buttocks, digits and/or nails.   - Dome shaped bright  red papules on the trunk..   - Multiple regular tan and brown pigmented macules and papules on the face, trunk and extremities.   - Scattered brown macules on sun exposed areas, diffusely.  - Waxy well-demarcated stuck on appearing white, tan and brown papules on the face including right temple and right lower eyelid, right occipital hairline, trunk and extremities.  - No other lesions of concern on areas examined.       Medications:  Current Outpatient Medications   Medication Sig Dispense Refill     atenolol (TENORMIN) 25 MG tablet TAKE 1 TABLET(25 MG) BY MOUTH DAILY (Patient taking differently: Take 25 mg by mouth every evening.) 90 tablet 0     calcium citrate-vitamin D (CITRACAL) 200-6.25 MG-MCG TABS per tablet Take 1 tablet by mouth every evening.       cholecalciferol (VITAMIN D3) 25 mcg (1000 units) capsule Take 1 capsule by mouth every evening.       glucosamine-chondroitin 500-400 MG CAPS per capsule Take 1 capsule by mouth every evening       ketoconazole (NIZORAL) 2 % external cream Apply topically daily Use as needed for itchy areas on the face and ears 60 g 4     ketorolac tromethamine (ACULAR-LS) 0.4 % SOLN ophthalmic solution Operated eye: Start same day as surgery 1 drop four times a day for 1 week, then twice a day for 1 week, then once a day for 1 week, then stop. 5 mL 1     lovastatin (MEVACOR) 40 MG tablet Take 1 tablet (40 mg) by mouth at bedtime. **Due for visit and labs, needed for further refills. Please schedule via [facility] or call the clinic** 90 tablet 0     prednisoLONE acetate (PRED FORTE) 1 % ophthalmic suspension Operated eye: Start the same day of surgery 1 drop four times a day for 1 week, then twice a day for 1 week, then once a day for 1 week, then stop 5 mL 1     vitamin E (TOCOPHEROL) 400 units (180 mg) capsule Take 400 Units by mouth every evening       ofloxacin (OCUFLOX) 0.3 % ophthalmic solution Apply 1 drop to eye 4 times daily. Instill into operative eye(s) per physician  instructions. (Patient not taking: Reported on 11/5/2024) 5 mL 0     prednisoLONE acetate (PRED FORTE) 1 % ophthalmic suspension Apply 1 drop to eye 4 times daily. Instill into operative eye(s) per physician instructions. (Patient not taking: Reported on 11/5/2024) 5 mL 1     No current facility-administered medications for this visit.      Past Medical History:   Patient Active Problem List   Diagnosis     Hypertension goal BP (blood pressure) < 140/90     History of basal cell carcinoma     Advance care planning     Nuclear sclerotic cataract of both eyes     Epiretinal membrane (ERM) of right eye     Nuclear sclerotic cataract of left eye     Past Medical History:   Diagnosis Date     Arthritis      Basal cell carcinoma      Cataract      Epiretinal membrane (ERM) of right eye      Hyperlipidaemia LDL goal < 100      Hyperlipidemia      Hypertension goal BP (blood pressure) < 140/90      Psoriasis         CC Bettina Grady MD  [address] [place] [address] [address]  [place] [address] on close of this encounter.       Again, thank you for allowing me to participate in the care of your patient.      Sincerely,    Bettina Grady MD

## 2024-12-04 NOTE — PATIENT INSTRUCTIONS
Cryotherapy    What is it?  Use of a very cold liquid, such as liquid nitrogen, to freeze and destroy abnormal skin cells that need to be removed    What should I expect?  Tenderness and redness  A small blister that might grow and fill with dark purple blood. There may be crusting.  More than one treatment may be needed if the lesions do not go away.    How do I care for the treated area?  Gently wash the area with your hands when bathing.  Use a thin layer of Vaseline to help with healing. You may use a Band-Aid.   The area should heal within 7-10 days and may leave behind a pink or lighter color.   Do not use an antibiotic or Neosporin ointment.   You may take acetaminophen (Tylenol) for pain.     Call your doctor if you have:  Severe pain  Signs of infection (warmth, redness, cloudy yellow drainage, and or a bad smell)  Questions or concerns    Who should I call with questions?      [facility]: [phone]      [facility]: [phone]      For urgent needs outside of business hours call the [facility] at [phone] and ask for the dermatology resident on call

## 2024-12-06 RX ORDER — ONDANSETRON 4 MG/1
4 TABLET, ORALLY DISINTEGRATING ORAL EVERY 30 MIN PRN
Status: CANCELLED | OUTPATIENT
Start: 2024-12-06

## 2024-12-06 RX ORDER — ONDANSETRON 2 MG/ML
4 INJECTION INTRAMUSCULAR; INTRAVENOUS EVERY 30 MIN PRN
Status: CANCELLED | OUTPATIENT
Start: 2024-12-06

## 2024-12-06 RX ORDER — LABETALOL HYDROCHLORIDE 5 MG/ML
10 INJECTION, SOLUTION INTRAVENOUS
Status: CANCELLED | OUTPATIENT
Start: 2024-12-06

## 2024-12-06 RX ORDER — SODIUM CHLORIDE, SODIUM LACTATE, POTASSIUM CHLORIDE, CALCIUM CHLORIDE 600; 310; 30; 20 MG/100ML; MG/100ML; MG/100ML; MG/100ML
INJECTION, SOLUTION INTRAVENOUS CONTINUOUS
Status: CANCELLED | OUTPATIENT
Start: 2024-12-06

## 2024-12-06 RX ORDER — FENTANYL CITRATE 50 UG/ML
25 INJECTION, SOLUTION INTRAMUSCULAR; INTRAVENOUS EVERY 5 MIN PRN
Status: CANCELLED | OUTPATIENT
Start: 2024-12-06

## 2024-12-06 RX ORDER — NALOXONE HYDROCHLORIDE 0.4 MG/ML
0.1 INJECTION, SOLUTION INTRAMUSCULAR; INTRAVENOUS; SUBCUTANEOUS
Status: CANCELLED | OUTPATIENT
Start: 2024-12-06

## 2024-12-06 RX ORDER — DEXAMETHASONE SODIUM PHOSPHATE 10 MG/ML
4 INJECTION, SOLUTION INTRAMUSCULAR; INTRAVENOUS
Status: CANCELLED | OUTPATIENT
Start: 2024-12-06

## 2024-12-06 RX ORDER — HYDROMORPHONE HYDROCHLORIDE 1 MG/ML
0.4 INJECTION, SOLUTION INTRAMUSCULAR; INTRAVENOUS; SUBCUTANEOUS EVERY 5 MIN PRN
Status: CANCELLED | OUTPATIENT
Start: 2024-12-06

## 2024-12-06 RX ORDER — OXYCODONE HYDROCHLORIDE 5 MG/1
5 TABLET ORAL
Status: CANCELLED | OUTPATIENT
Start: 2024-12-06

## 2024-12-06 RX ORDER — HYDROMORPHONE HYDROCHLORIDE 1 MG/ML
0.2 INJECTION, SOLUTION INTRAMUSCULAR; INTRAVENOUS; SUBCUTANEOUS EVERY 5 MIN PRN
Status: CANCELLED | OUTPATIENT
Start: 2024-12-06

## 2024-12-06 RX ORDER — OXYCODONE HYDROCHLORIDE 5 MG/1
10 TABLET ORAL
Status: CANCELLED | OUTPATIENT
Start: 2024-12-06

## 2024-12-06 RX ORDER — FENTANYL CITRATE 50 UG/ML
50 INJECTION, SOLUTION INTRAMUSCULAR; INTRAVENOUS EVERY 5 MIN PRN
Status: CANCELLED | OUTPATIENT
Start: 2024-12-06

## 2024-12-08 ASSESSMENT — ENCOUNTER SYMPTOMS
SEIZURES: 0
DYSRHYTHMIAS: 0

## 2024-12-08 ASSESSMENT — LIFESTYLE VARIABLES: TOBACCO_USE: 0

## 2024-12-08 NOTE — ANESTHESIA PREPROCEDURE EVALUATION
Anesthesia Pre-Procedure Evaluation    Patient: Lidia Jenkins   MRN: [id number] : 1945        Procedure : Procedure(s):  LEFT EYE PHACOEMULSIFICATION, CATARACT, WITH STANDARD INTRAOCULAR LENS IMPLANT INSERTION          Past Medical History:   Diagnosis Date     Arthritis      Basal cell carcinoma      Cataract      Epiretinal membrane (ERM) of right eye      Hyperlipidaemia LDL goal < 100      Hyperlipidemia      Hypertension goal BP (blood pressure) < 140/90      Psoriasis       Past Surgical History:   Procedure Laterality Date     ABDOMEN SURGERY       COLONOSCOPY       PHACOEMULSIFICATION WITH STANDARD INTRAOCULAR LENS IMPLANT Right 2024    Procedure: RIGHT EYE PHACOEMULSIFICATION, CATARACT, WITH STANDARD INTRAOCULAR LENS IMPLANT INSERTION;  Surgeon: Whitney Edouadr MD;  Location: [facility] OR     RELEASE TRIGGER FINGER Left 8/15/2019    Procedure: Left Middle Finger Trigger Release;  Surgeon: Castro Navarro MD;  Location: [facility] OR     VITRECTOMY PARSPLANA WITH 25 GAUGE SYSTEM Right 10/7/2024    Procedure: RIGHT EYE VITRECTOMY, PARS PLANA APPROACH, USING 25-GAUGE INSTRUMENTS, MEMBRANE PEEL, AIR FLUID EXCHANGE;  Surgeon: Bry Jackman MD;  Location: [facility] OR      No Known Allergies   Social History     Tobacco Use     Smoking status: Never     Passive exposure: Never     Smokeless tobacco: Never   Substance Use Topics     Alcohol use: Yes     Alcohol/week: 3.0 standard drinks of alcohol     Types: 3 Standard drinks or equivalent per week     Comment: 1-3 glasses of wine a week      Wt Readings from Last 1 Encounters:   24 74.8 kg (165 lb)        Anesthesia Evaluation   Pt has had prior anesthetic. Type: MAC and General.    No history of anesthetic complications       ROS/MED HX  ENT/Pulmonary:     (+)     DWAYNE risk factors, snores loudly, hypertension,                              (-) tobacco use, asthma and recent URI   Neurologic:  - neg neurologic ROS  (-) no  seizures and no CVA   Cardiovascular: Comment: Denies cardiac symptoms including chest pain, SOB, palpitations, syncope, MEJÍA, orthopnea, or PND.       (+) Dyslipidemia hypertension-range: Home monitoring 130-140/80s/ -   -  - -                                 Previous cardiac testing   Echo: Date: Results:    Stress Test:  Date: Results:    ECG Reviewed:  Date: 2015 Results:  SR, LAD, LAFB, no change from previous  Cath:  Date: Results:   (-) taking anticoagulants/antiplatelets, MEJÍA and arrhythmias   METS/Exercise Tolerance: >4 METS Comment: Silver sneakers, walking 2 miles daily, gardening    Hematologic:  - neg hematologic  ROS  (-) history of blood clots, anemia and history of blood transfusion   Musculoskeletal:  - neg musculoskeletal ROS     GI/Hepatic: Comment: Occasional GERD symptoms, with no specific meds    (+) GERD, Other,                  Renal/Genitourinary:  - neg Renal ROS     Endo:  - neg endo ROS     Psychiatric/Substance Use:  - neg psychiatric ROS     Infectious Disease:  - neg infectious disease ROS     Malignancy: Comment: BCC  (+) Malignancy, History of Skin.Skin CA Remission status post Surgery.      Other: Comment: Had cataract done last week and is now getting the other eye done today           Physical Exam    Airway        Mallampati: II   TM distance: > 3 FB   Neck ROM: full   Mouth opening: > 3 cm    Respiratory Devices and Support         Dental       (+) Multiple crowns, permanant bridges    B=Bridge, C=Chipped, L=Loose, M=Missing    Cardiovascular   cardiovascular exam normal       Rhythm and rate: regular     Pulmonary   pulmonary exam normal        breath sounds clear to auscultation       OUTSIDE LABS:  CBC:   Lab Results   Component Value Date    WBC 5.8 09/13/2023    WBC 4.9 06/15/2021    HGB 12.9 09/13/2023    HGB 13.7 08/27/2022    HCT 39.0 09/13/2023    HCT 40.5 06/15/2021     09/13/2023     06/15/2021     BMP:   Lab Results   Component Value Date      "09/13/2023     08/27/2022    POTASSIUM 4.8 09/13/2023    POTASSIUM 4.3 08/27/2022    CHLORIDE 106 09/13/2023    CHLORIDE 110 (H) 08/27/2022    CO2 25 09/13/2023    CO2 27 08/27/2022    BUN 26.0 (H) 09/13/2023    BUN 19 08/27/2022    CR 0.84 09/13/2023    CR 0.75 08/27/2022    GLC 92 09/13/2023    GLC 98 08/27/2022     COAGS: No results found for: \"PTT\", \"INR\", \"FIBR\"  POC: No results found for: \"BGM\", \"HCG\", \"HCGS\"  HEPATIC:   Lab Results   Component Value Date    ALBUMIN 4.6 09/13/2023    PROTTOTAL 7.2 09/13/2023    ALT 18 09/13/2023    AST 27 09/13/2023    ALKPHOS 52 09/13/2023    BILITOTAL 0.3 09/13/2023     OTHER:   Lab Results   Component Value Date    TAMIKO 9.9 09/13/2023    MAG 2.7 (H) 09/13/2023    TSH 3.62 09/13/2023       Anesthesia Plan    ASA Status:  3    NPO Status:  NPO Appropriate    Anesthesia Type: MAC.     - Reason for MAC: straight local not clinically adequate              Consents    Anesthesia Plan(s) and associated risks, benefits, and realistic alternatives discussed. Questions answered and patient/representative(s) expressed understanding.     - Discussed:     - Discussed with:  Patient      - Extended Intubation/Ventilatory Support Discussed: No.      - Patient is DNR/DNI Status: No     Use of blood products discussed: No .     Postoperative Care            Comments:               Karen Mayo MD    I have reviewed the pertinent notes and labs in the chart from the past 30 days and (re)examined the patient.  Any updates or changes from those notes are reflected in this note.               # Hypertension: Noted on problem list           # Overweight: Estimated body mass index is 28.32 kg/m  as calculated from the following:    Height as of 11/25/24: 1.626 m (5' 4\").    Weight as of 11/25/24: 74.8 kg (165 lb).             "

## 2024-12-09 ENCOUNTER — ANESTHESIA (OUTPATIENT)
Dept: SURGERY | Facility: AMBULATORY SURGERY CENTER | Age: 79
End: 2024-12-09
Payer: COMMERCIAL

## 2024-12-09 ENCOUNTER — HOSPITAL ENCOUNTER (OUTPATIENT)
Facility: AMBULATORY SURGERY CENTER | Age: 79
Discharge: HOME | End: 2024-12-09
Attending: OPHTHALMOLOGY
Payer: COMMERCIAL

## 2024-12-09 VITALS
RESPIRATION RATE: 16 BRPM | BODY MASS INDEX: 27.49 KG/M2 | SYSTOLIC BLOOD PRESSURE: 143 MMHG | TEMPERATURE: 97.6 F | WEIGHT: 165 LBS | HEART RATE: 64 BPM | DIASTOLIC BLOOD PRESSURE: 75 MMHG | HEIGHT: 65 IN | OXYGEN SATURATION: 98 %

## 2024-12-09 PROCEDURE — 66984 XCAPSL CTRC RMVL W/O ECP: CPT | Mod: LT

## 2024-12-09 PROCEDURE — 66984 XCAPSL CTRC RMVL W/O ECP: CPT | Mod: 79 | Performed by: OPHTHALMOLOGY

## 2024-12-09 DEVICE — LENS CC60WF 24.0 CLAREON UV ASPHERIC BICONVEX IOL: Type: IMPLANTABLE DEVICE | Site: EYE | Status: FUNCTIONAL

## 2024-12-09 RX ORDER — SODIUM CHLORIDE, SODIUM LACTATE, POTASSIUM CHLORIDE, CALCIUM CHLORIDE 600; 310; 30; 20 MG/100ML; MG/100ML; MG/100ML; MG/100ML
INJECTION, SOLUTION INTRAVENOUS CONTINUOUS
Status: DISCONTINUED | OUTPATIENT
Start: 2024-12-09 | End: 2024-12-11 | Stop reason: HOSPADM

## 2024-12-09 RX ORDER — PROPARACAINE HYDROCHLORIDE 5 MG/ML
1 SOLUTION/ DROPS OPHTHALMIC ONCE
Status: COMPLETED | OUTPATIENT
Start: 2024-12-09 | End: 2024-12-09

## 2024-12-09 RX ORDER — CYCLOPENTOLAT/TROPIC/PHENYLEPH 1%-1%-2.5%
1 DROPS (EA) OPHTHALMIC (EYE)
Status: COMPLETED | OUTPATIENT
Start: 2024-12-09 | End: 2024-12-09

## 2024-12-09 RX ORDER — LIDOCAINE HYDROCHLORIDE 10 MG/ML
INJECTION, SOLUTION EPIDURAL; INFILTRATION; INTRACAUDAL; PERINEURAL PRN
Status: DISCONTINUED | OUTPATIENT
Start: 2024-12-09 | End: 2024-12-09 | Stop reason: HOSPADM

## 2024-12-09 RX ORDER — TETRACAINE HYDROCHLORIDE 5 MG/ML
SOLUTION OPHTHALMIC PRN
Status: DISCONTINUED | OUTPATIENT
Start: 2024-12-09 | End: 2024-12-09 | Stop reason: HOSPADM

## 2024-12-09 RX ORDER — SODIUM CHLORIDE, SODIUM LACTATE, POTASSIUM CHLORIDE, CALCIUM CHLORIDE 600; 310; 30; 20 MG/100ML; MG/100ML; MG/100ML; MG/100ML
INJECTION, SOLUTION INTRAVENOUS CONTINUOUS
Status: CANCELLED | OUTPATIENT
Start: 2024-12-09

## 2024-12-09 RX ORDER — MOXIFLOXACIN IN NACL,ISO-OS/PF 0.3MG/0.3
SYRINGE (ML) INTRAOCULAR PRN
Status: DISCONTINUED | OUTPATIENT
Start: 2024-12-09 | End: 2024-12-09 | Stop reason: HOSPADM

## 2024-12-09 RX ORDER — PREDNISOLONE ACETATE 1 %
SUSPENSION, DROPS(FINAL DOSAGE FORM)(ML) OPHTHALMIC (EYE) PRN
Status: DISCONTINUED | OUTPATIENT
Start: 2024-12-09 | End: 2024-12-09 | Stop reason: HOSPADM

## 2024-12-09 RX ORDER — MOXIFLOXACIN 5 MG/ML
1 SOLUTION/ DROPS OPHTHALMIC
Status: CANCELLED | OUTPATIENT
Start: 2024-12-09

## 2024-12-09 RX ORDER — LIDOCAINE 40 MG/G
CREAM TOPICAL
Status: DISCONTINUED | OUTPATIENT
Start: 2024-12-09 | End: 2024-12-11 | Stop reason: HOSPADM

## 2024-12-09 RX ORDER — BALANCED SALT SOLUTION 6.4; .75; .48; .3; 3.9; 1.7 MG/ML; MG/ML; MG/ML; MG/ML; MG/ML; MG/ML
SOLUTION OPHTHALMIC PRN
Status: DISCONTINUED | OUTPATIENT
Start: 2024-12-09 | End: 2024-12-09 | Stop reason: HOSPADM

## 2024-12-09 RX ORDER — ACETAMINOPHEN 325 MG/1
975 TABLET ORAL ONCE
Status: COMPLETED | OUTPATIENT
Start: 2024-12-09 | End: 2024-12-09

## 2024-12-09 RX ADMIN — ACETAMINOPHEN 975 MG: 325 TABLET ORAL at 13:32

## 2024-12-09 RX ADMIN — Medication 1 DROP: at 13:29

## 2024-12-09 RX ADMIN — PROPARACAINE HYDROCHLORIDE 1 DROP: 5 SOLUTION/ DROPS OPHTHALMIC at 13:29

## 2024-12-09 RX ADMIN — Medication 1 DROP: at 13:34

## 2024-12-09 RX ADMIN — Medication 1 DROP: at 13:40

## 2024-12-09 NOTE — DISCHARGE INSTRUCTIONS
[facility]  Home Care Following Anesthesia  For 24 hours after surgery:  Get plenty of rest.  A responsible adult must stay with you for at least 24 hours after you leave the surgery center.  Do not drive or use heavy equipment.  If you have weakness or tingling, don't drive or use heavy equipment until this feeling goes away.   Do not drink alcohol.   Avoid strenuous or risky activities.  Ask for help when climbing stairs.  You may feel lightheaded.  IF so, sit for a few minutes before standing.  Have someone help you get up.   If you have nausea (feel sick to your stomach): Drink only clear liquids such as apple juice, ginger ale, broth or 7-Up.  Rest may also help.  Be sure to drink enough fluids.  Move to a regular diet as you feel able.   You may have a slight fever.  Call the doctor if your fever is over 100 F (37.7 C) (taken under the tongue) or lasts longer than 24 hours.  You may have a dry mouth, a sore throat, muscle aches or trouble sleeping. These should go away after 24 hours.  Do not make important or legal decisions.   It is recommended to avoid smoking.               Tips for taking pain medications  To get the best pain relief possible, remember these points:  Take pain medications as directed, before pain becomes severe.  Pain medication can upset your stomach: taking it with food may help.  Constipation is a common side effect of pain medication. Drink plenty of  fluids.  Eat foods high in fiber. Take a stool softener if recommended by your doctor or pharmacist.  Do not drink alcohol, drive or operate machinery while taking pain medications.  Ask about other ways to control pain, such as with heat, ice or relaxation.    Tylenol/Acetaminophen Consumption    If you feel your pain relief is insufficient, you may take Tylenol/Acetaminophen in addition to your narcotic pain medication.   Be careful not to exceed 4,000 mg of Tylenol/Acetaminophen in a 24 hour  period from all sources.  If you are taking extra strength Tylenol/acetaminophen (500 mg), the maximum dose is 8 tablets in 24 hours.  If you are taking regular strength acetaminophen (325 mg), the maximum dose is 12 tablets in 24 hours.  Tylenol 975 mg given at 1:30pm.   Ok to take more after 7:30pm.      Call a doctor for any of the following:  Signs of infection (fever, growing tenderness at the surgery site, a large amount of drainage or bleeding, severe pain, foul-smelling drainage, redness, swelling).  It has been over 8 to 10 hours since surgery and you are still not able to urinate (pass water).  Headache for over 24 hours.  Numbness, tingling or weakness the day after surgery (if you had spinal anesthesia).  Signs of Covid-19 infection (temperature over 100 degrees, shortness of breath, cough, loss of taste/smell, generalized body aches, persistent headache, chills, sore throat, nausea/vomiting/diarrhea)  Your doctor is:  Dr. Whitney Edouard, Ophthalmology: [phone]                    Or dial [phone] and ask for the resident on call for:  Ophthalmology  For emergency care, call the:  [facility]:  [phone] (TTY for hearing impaired: [phone])

## 2024-12-09 NOTE — ANESTHESIA POSTPROCEDURE EVALUATION
Patient: Lidia Jenkins    Procedure: Procedure(s):  LEFT EYE PHACOEMULSIFICATION, CATARACT, WITH STANDARD INTRAOCULAR LENS IMPLANT INSERTION       Anesthesia Type:  MAC    Note:  Disposition: Outpatient   Postop Pain Control: Uneventful            Sign Out: Well controlled pain   PONV: No   Neuro/Psych: Uneventful            Sign Out: Acceptable/Baseline neuro status   Airway/Respiratory: Uneventful            Sign Out: Acceptable/Baseline resp. status   CV/Hemodynamics: Uneventful            Sign Out: Acceptable CV status; No obvious hypovolemia; No obvious fluid overload   Other NRE: NONE   DID A NON-ROUTINE EVENT OCCUR? No       Last vitals:  Vitals Value Taken Time   /75 12/09/24 1620   Temp 36.4  C (97.6  F) 12/09/24 1620   Pulse 64 12/09/24 1620   Resp 16 12/09/24 1620   SpO2 98 % 12/09/24 1620       Electronically Signed By: Karen Mayo MD  December 9, 2024  5:02 PM

## 2024-12-09 NOTE — OP NOTE
Pre-operative Diagnosis: Visually significant cataract, Left eye    Post-operative Diagnosis:  Pseudophakia, Left     Operative Procedure:  Phacoemulsification with intraocular lens implantation, Left eye     Surgeon(s):  Whitney Edouard MD    Anesthesia:   Topical/MAC  Findings:  No unusual findings   Blood Loss:    Minimal  Implants:   Implant Name Type Inv. Item Serial No.  Lot No. LRB No. Used Action   LENS [id number] 24.0 [employer] UV ASPHERIC BICONVEX IOL - [id number] Lens/Eye Implant LENS [id number] 24.0 [employer] UV ASPHERIC BICONVEX IOL [id number] [facility]  Left 1 Implanted           Specimens:  None      Complications:  none   Condition:  stable    Indications: The patient Lidia Jenkins presented to the eye clinic with decreased vision secondary to cataract in the Left eye. The risks, benefits and alternatives to cataract extraction were discussed. The patient elected to proceed. All questions were answered to the patient's satisfaction.    Description of Procedure:   Prior to the procedure, appropriate cardiac and respiratory monitors were applied to the patient. The patient was brought to the operating room where a drop of topical tetracaine was given followed by povidone iodine.  With adequate anesthesia, the Left eye was prepped and draped in the usual sterile fashion.  A surgical pause was carried out to identify with all members of the surgical team the correct surgical site.   A lid speculum was placed, and the operating microscope was rotated into position.  A paracentesis was created to the left of the planned temporal incision.  Through this limbal paracentesis, the anterior chamber was filled with preservative-free lidocaine followed by dispersive viscoelastic.  A temporal wound was created at the limbus using a 2.5 mm keratome blade.  A capsulorrhexis was initiated using a bent 25-gauge needle and was completed in continuous and circular fashion using the capsulorrhexis forceps.  The lens nucleus was hydrodissected using balanced salt solution.  The lens nucleus was rotated and removed using phacoemulsification.  Residual cortical material was removed using irrigation-aspiration.  The capsular bag was reinflated to its maximal extent with cohesive viscoelastic.  An intraocular lens implant of the above listed power was inserted into the capsular bag.  The lens power was reviewed using the preoperative intraocular lens power measurements to confirm that the correct lens was used for the desired post-operative refractive state.  The residual viscoelastic was removed in its entirety, the wounds were hydrated and found to be self-sealing.  A dose of 0.1mg of intracameral moxifloxacin was administered through the paracentesis.  Tactile pressure was confirmed to be in a normal range.  The lid speculum was removed and a drop of prednisolone acetate 1% and ketorolac were given before a shield was applied.  The patient tolerated the procedure well, and there were no complications.    Plan: The patient will be discharged to home and will follow up tomorrow morning in the eye clinic.

## 2024-12-10 ENCOUNTER — OFFICE VISIT (OUTPATIENT)
Dept: OPHTHALMOLOGY | Facility: CLINIC | Age: 79
End: 2024-12-10
Payer: COMMERCIAL

## 2024-12-10 DIAGNOSIS — Z96.1 PSEUDOPHAKIA OF LEFT EYE: Primary | ICD-10-CM

## 2024-12-10 ASSESSMENT — SLIT LAMP EXAM - LIDS
COMMENTS: NORMAL
COMMENTS: NORMAL

## 2024-12-10 ASSESSMENT — REFRACTION_WEARINGRX
OS_CYLINDER: +1.00
SPECS_TYPE: PAL
OS_ADD: +2.50
OS_AXIS: 110
OS_SPHERE: +0.75

## 2024-12-10 ASSESSMENT — REFRACTION_MANIFEST
OS_AXIS: 115
OS_CYLINDER: +1.25
OS_SPHERE: -1.25

## 2024-12-10 ASSESSMENT — TONOMETRY
IOP_METHOD: ICARE
OS_IOP_MMHG: 19
OD_IOP_MMHG: 12

## 2024-12-10 ASSESSMENT — VISUAL ACUITY
OD_SC: 20/25
METHOD: SNELLEN - LINEAR
OS_SC+: -2
OS_SC: 20/50
OD_SC+: -1+1
OS_PH_SC: 20/30
OS_PH_SC+: -2

## 2024-12-10 ASSESSMENT — EXTERNAL EXAM - LEFT EYE: OS_EXAM: NORMAL

## 2024-12-10 ASSESSMENT — EXTERNAL EXAM - RIGHT EYE: OD_EXAM: NORMAL

## 2024-12-10 NOTE — PROGRESS NOTES
CC: post op day #1, status post cataract surgery, left eye 12/9/24    HPI:  Lidia Jenkins is a 79 year old female here for above.    HPI       Post Op (Ophthalmology) Left Eye    Associated symptoms include Negative for eye pain.  Treatments tried include eye drops.  Pain was noted as 0/10. Additional comments: POD1 s/p CE/IOL left eye 12/9/24             Comments    No eye pain or discomfort.   She feels VA is slightly blurry.  Slept with eye shield overnight.   Began her post op eyedrops this AM.     Giovanni Rodriguez 10:34 AM December 10, 2024               Last edited by Giovanni Rodriguez on 12/10/2024 10:34 AM.         POH: 7/2024 phacoemulsification intraocular lens right eye,  PPV/MP/Afx right eye for ERM 10/7/24 , phacoemulsification intraocular lens left eye 12/9/24    Assessment/Plan:  1. Pseudophakia of left eye - Left Eye       Comment:  Postoperative day #1, good post-operative appearance.  Wounds water-tight and IOP reasonable.  Visual acuity mildly limited by refractive error    Plan:   Operative eye:    Prednisolone (white cap, milky drop) 4 times daily   Ketorolac (gray cap) 4 times daily  Patient received intracameral Moxifloxacin in surgery    Instructions for patient reviewed:  Eye protection at all times and eye shield at night for 1 week.  Limited activities with no exercise or heavy lifting for 1 week.  Instructed patient to contact immediately for decreasing vision, eye pain, increased redness, new floaters or flashes of light, or other concerning symptoms.  Return in about 1 week     Return in about 1 week (around 12/17/2024) for post-op cataract sx, POW  1  OS.     Complete documentation of historical and exam elements from today's encounter can be found in the full encounter summary report (not reduplicated in this progress note). I personally obtained the chief complaint(s) and history of present illness.  I have confirmed and edited as necessary the CC, HPI, PMH/PSH, social history, FMH, ROS, and exam/neuro  findings as obtained by the technician or others. I have examined this patient myself and I personally viewed the image(s) and studies listed above and the documentation reflects my findings and interpretation.  I formulated and edited as necessary the assessment and plan and discussed the findings and management plan with the patient and family.     Whitney Edouard MD

## 2024-12-10 NOTE — NURSING NOTE
Chief Complaints and History of Present Illnesses   Patient presents with    Post Op (Ophthalmology) Left Eye     POD1 s/p CE/IOL left eye 12/9/24     Chief Complaint(s) and History of Present Illness(es)       Post Op (Ophthalmology) Left Eye              Associated symptoms: Negative for eye pain    Treatments tried: eye drops    Pain scale: 0/10    Comments: POD1 s/p CE/IOL left eye 12/9/24              Comments    No eye pain or discomfort.   She feels VA is slightly blurry.  Slept with eye shield overnight.   Began her post op eyedrops this AM.     Giovanni Rodriguez 10:34 AM December 10, 2024

## 2024-12-13 ENCOUNTER — LAB (OUTPATIENT)
Dept: LAB | Facility: CLINIC | Age: 79
End: 2024-12-13
Payer: COMMERCIAL

## 2024-12-13 DIAGNOSIS — R73.09 ELEVATED HEMOGLOBIN A1C: ICD-10-CM

## 2024-12-13 DIAGNOSIS — E53.8 B12 DEFICIENCY: ICD-10-CM

## 2024-12-13 DIAGNOSIS — E78.5 HYPERLIPIDEMIA, UNSPECIFIED HYPERLIPIDEMIA TYPE: ICD-10-CM

## 2024-12-13 DIAGNOSIS — I10 HYPERTENSION GOAL BP (BLOOD PRESSURE) < 140/90: ICD-10-CM

## 2024-12-13 DIAGNOSIS — L60.3 NAIL DYSTROPHY: ICD-10-CM

## 2024-12-13 LAB
ANION GAP SERPL CALCULATED.3IONS-SCNC: 9 MMOL/L (ref 7–15)
BUN SERPL-MCNC: 30.1 MG/DL (ref 8–23)
CALCIUM SERPL-MCNC: 9.5 MG/DL (ref 8.8–10.4)
CHLORIDE SERPL-SCNC: 108 MMOL/L (ref 98–107)
CHOLEST SERPL-MCNC: 194 MG/DL
CREAT SERPL-MCNC: 0.88 MG/DL (ref 0.51–0.95)
EGFRCR SERPLBLD CKD-EPI 2021: 67 ML/MIN/1.73M2
EST. AVERAGE GLUCOSE BLD GHB EST-MCNC: 117 MG/DL
FASTING STATUS PATIENT QL REPORTED: NO
FASTING STATUS PATIENT QL REPORTED: NO
GLUCOSE SERPL-MCNC: 93 MG/DL (ref 70–99)
HBA1C MFR BLD: 5.7 %
HCO3 SERPL-SCNC: 26 MMOL/L (ref 22–29)
HDLC SERPL-MCNC: 68 MG/DL
LDLC SERPL CALC-MCNC: 104 MG/DL
NONHDLC SERPL-MCNC: 126 MG/DL
POTASSIUM SERPL-SCNC: 4.5 MMOL/L (ref 3.4–5.3)
SODIUM SERPL-SCNC: 143 MMOL/L (ref 135–145)
TRIGL SERPL-MCNC: 112 MG/DL
TSH SERPL DL<=0.005 MIU/L-ACNC: 3.75 UIU/ML (ref 0.3–4.2)
VIT B12 SERPL-MCNC: 466 PG/ML (ref 232–1245)

## 2024-12-13 PROCEDURE — 99000 SPECIMEN HANDLING OFFICE-LAB: CPT | Performed by: PATHOLOGY

## 2024-12-13 PROCEDURE — 84443 ASSAY THYROID STIM HORMONE: CPT | Performed by: PATHOLOGY

## 2024-12-13 PROCEDURE — 80048 BASIC METABOLIC PNL TOTAL CA: CPT | Performed by: PATHOLOGY

## 2024-12-13 PROCEDURE — 80061 LIPID PANEL: CPT | Performed by: PATHOLOGY

## 2024-12-13 PROCEDURE — 82607 VITAMIN B-12: CPT | Performed by: INTERNAL MEDICINE

## 2024-12-13 PROCEDURE — 83036 HEMOGLOBIN GLYCOSYLATED A1C: CPT | Performed by: INTERNAL MEDICINE

## 2024-12-13 PROCEDURE — 36415 COLL VENOUS BLD VENIPUNCTURE: CPT | Performed by: PATHOLOGY

## 2024-12-16 PROBLEM — R73.03 PRE-DIABETES: Status: ACTIVE | Noted: 2024-12-16

## 2024-12-23 DIAGNOSIS — H35.371 EPIRETINAL MEMBRANE (ERM) OF RIGHT EYE: Primary | ICD-10-CM

## 2024-12-30 ENCOUNTER — OFFICE VISIT (OUTPATIENT)
Dept: OPHTHALMOLOGY | Facility: CLINIC | Age: 79
End: 2024-12-30
Payer: COMMERCIAL

## 2024-12-30 DIAGNOSIS — H52.203 MYOPIC ASTIGMATISM OF BOTH EYES: ICD-10-CM

## 2024-12-30 DIAGNOSIS — H52.4 PRESBYOPIA OF BOTH EYES: ICD-10-CM

## 2024-12-30 DIAGNOSIS — Z96.1 PSEUDOPHAKIA OF LEFT EYE: Primary | ICD-10-CM

## 2024-12-30 DIAGNOSIS — H52.13 MYOPIC ASTIGMATISM OF BOTH EYES: ICD-10-CM

## 2024-12-30 ASSESSMENT — TONOMETRY
OS_IOP_MMHG: 14
IOP_METHOD: ICARE
OD_IOP_MMHG: 15

## 2024-12-30 ASSESSMENT — VISUAL ACUITY
OD_SC: 20/25
OD_SC+: -3
OS_SC: 20/50
OS_PH_SC: 20/25
METHOD: SNELLEN - LINEAR
OS_SC+: -2+2
OS_PH_SC+: -1

## 2024-12-30 ASSESSMENT — REFRACTION_MANIFEST
OD_SPHERE: -0.50
OD_AXIS: 075
OS_AXIS: 088
OS_SPHERE: -1.00
OD_CYLINDER: +0.50
OS_CYLINDER: +1.50

## 2024-12-30 ASSESSMENT — CUP TO DISC RATIO: OS_RATIO: 0.25

## 2024-12-30 ASSESSMENT — EXTERNAL EXAM - LEFT EYE: OS_EXAM: NORMAL

## 2024-12-30 ASSESSMENT — SLIT LAMP EXAM - LIDS
COMMENTS: NORMAL
COMMENTS: NORMAL

## 2024-12-30 ASSESSMENT — EXTERNAL EXAM - RIGHT EYE: OD_EXAM: NORMAL

## 2024-12-30 NOTE — PROGRESS NOTES
CC: post op day #1, status post cataract surgery, left eye 12/9/24    HPI:  Lidia Jenkins is a 79 year old female here for above.    HPI       Post Op (Ophthalmology) Both Eyes    In left eye. Additional comments: LEFT EYE PHACOEMULSIFICATION, CATARACT, WITH STANDARD INTRAOCULAR LENS IMPLANT INSERTION             Comments    Pt states vision is good.  No pain.  No flashes/floaters.  Pt is compliant with drops.    DOMINGA Khanna December 30, 2024 8:43 AM              Last edited by Maxx Sandoval COT on 12/30/2024  8:43 AM.         POH: 7/2024 phacoemulsification intraocular lens right eye,  PPV/MP/Afx right eye for ERM 10/7/24 , phacoemulsification intraocular lens left eye 12/9/24    Assessment/Plan:  1. Pseudophakia of left eye - Left Eye    2. Myopic astigmatism of both eyes - Both Eyes    3. Presbyopia of both eyes - Both Eyes         Comment:  Postoperative week  #3, good post-operative appearance.  Wounds water-tight and IOP reasonable.  Visual acuity mildly limited by refractive error, last week of drops left eye no inflammation    Plan:   Operative eye:    Prednisolone (white cap, milky drop) 1 time/day this week then stop  Ketorolac (gray cap) 1 time/day this week then stop    Instructions for patient reviewed:  Discontinue shield and resume normal activities   Instructed patient to contact immediately for decreasing vision, eye pain, increased redness, new floaters or flashes of light, or other concerning symptoms.  Return if left eye blurry with new glasses - patient will check vision each eye by itself weekly to monitor and will call with changes    Return in about 3 months (around 3/30/2025) for va/iop OCT macula OU if blurry vision os .     Complete documentation of historical and exam elements from today's encounter can be found in the full encounter summary report (not reduplicated in this progress note). I personally obtained the chief complaint(s) and history of present illness.  I have  confirmed and edited as necessary the CC, HPI, PMH/PSH, social history, FMH, ROS, and exam/neuro findings as obtained by the technician or others. I have examined this patient myself and I personally viewed the image(s) and studies listed above and the documentation reflects my findings and interpretation.  I formulated and edited as necessary the assessment and plan and discussed the findings and management plan with the patient and family.     Whitney Edouard MD

## 2024-12-30 NOTE — NURSING NOTE
Chief Complaints and History of Present Illnesses   Patient presents with    Post Op (Ophthalmology) Both Eyes     LEFT EYE PHACOEMULSIFICATION, CATARACT, WITH STANDARD INTRAOCULAR LENS IMPLANT INSERTION     Chief Complaint(s) and History of Present Illness(es)       Post Op (Ophthalmology) Both Eyes              Laterality: left eye    Comments: LEFT EYE PHACOEMULSIFICATION, CATARACT, WITH STANDARD INTRAOCULAR LENS IMPLANT INSERTION              Comments    Pt states vision is good.  No pain.  No flashes/floaters.  Pt is compliant with drops.    DOMINGA Khanna December 30, 2024 8:43 AM

## 2024-12-31 ENCOUNTER — TELEPHONE (OUTPATIENT)
Dept: OPHTHALMOLOGY | Facility: CLINIC | Age: 79
End: 2024-12-31
Payer: COMMERCIAL

## 2024-12-31 NOTE — TELEPHONE ENCOUNTER
[address] Internal Medicine  Paul Lang MD; Meet Fregoso MD; Madeleine Cooney MD; MD Yanira Demarco MD; Miguel Pierce MD    [facility]   [address] / HISTORY AND PHYSICAL EXAMINATION            Date:   11/6/2022  Patient name:  Jeannine Quiles  Date of admission:  10/29/2022  8:35 PM  MRN:   [id number]  Account:  [de-identified]  YOB: 1958  PCP:    Idania Krueger NP, APRN - CNP  Room:   [address] [place] [address]  Code Status:    Full Code    Physician Requesting Consult: Janie Carpenter MD    Reason for Consult: Cough rule out pneumonia      Chief Complaint:     No chief complaint on file. Cough rule out pneumonia    History Obtained From:     Patient medical record nursing staff    History of Present Illness:     COPD: Patient complains of dyspnea. Symptoms began 7 days ago. Symptoms acute dyspnea does worsen with exertion. Sputum is clear in small amounts. Fever has been low grade fevers. Patient uses 0 pillows at night. Patient can walk 10 feet before resting. Patient currently is not on home oxygen therapy. Les Pimple Respiratory history: asmoker        Past Medical History:     Past Medical History:   Diagnosis Date    Asthma     COPD (chronic obstructive pulmonary disease) ([address]     Emphysema of lung ([address]         Past Surgical History:     Past Surgical History:   Procedure Laterality Date    SKIN GRAFT Bilateral     lower extremities        Medications Prior to Admission:     Prior to Admission medications    Medication Sig Start Date End Date Taking?  Authorizing Provider   lidocaine viscous hcl (XYLOCAINE) 2 % SOLN solution Take 15 mLs by mouth every 3 hours as needed for Irritation    Historical Provider, MD   OLANZapine (ZYPREXA) 15 MG tablet Take 1 tablet by mouth in the morning and at bedtime 10/18/22   Janie Carpenter MD   albuterol sulfate HFA (PROVENTIL;VENTOLIN;PROAIR) 108 (90 Base) MCG/ACT [facility]    Phone Message    May a detailed message be left on voicemail: yes     Reason for Call: Other: Patient is requesting some further information on her recent glasses rx.      Patient states her glasses rx is for the bifocals but theres only 1 rx on there. Patient also states the prescription isn't for readers and she'll need her prescription for readers.      Action Taken: Message routed to:  [facility] eye    Travel Screening: Not Applicable     Date of Service:                                                                      inhaler Inhale 2 puffs into the lungs every 4 hours as needed for Wheezing    Historical Provider, MD   docusate sodium (COLACE) 100 MG capsule Take 100 mg by mouth 3 times daily as needed for Constipation    Historical Provider, MD   ipratropium (ATROVENT) 0.02 % nebulizer solution Take 0.5 mg by nebulization 4 times daily    Historical Provider, MD   Acidophilus Lactobacillus CAPS Take 1 capsule by mouth daily    Historical Provider, MD   ibuprofen (ADVIL;MOTRIN) 200 MG tablet Take 200 mg by mouth every 6 hours as needed for Pain Pt taking 2 pills prn    Historical Provider, MD   mometasone-formoterol (DULERA) 200-5 MCG/ACT inhaler Inhale 2 puffs into the lungs 2 times daily 6/1/22   Jordan Rae MD        Allergies:     Prednisone, Morphine, and Propoxyphene    Social History:     Tobacco:    reports that he has been smoking cigarettes. He has a 45.00 pack-year smoking history. He has never used smokeless tobacco.  Alcohol:      reports current alcohol use. Drug Use:  reports no history of drug use. Family History:     History reviewed. No pertinent family history. Review of Systems:     Positive and Negative as described in HPI. CONSTITUTIONAL:  negative for fevers, chills, sweats, fatigue, weight loss  HEENT:  negative for vision, hearing changes, runny nose, throat pain  RESPIRATORY: Cough dyspnea. CARDIOVASCULAR:  negative for chest pain, palpitations.   GASTROINTESTINAL:  negative for nausea, vomiting, diarrhea, constipation, change in bowel habits, abdominal pain   GENITOURINARY:  negative for difficulty of urination, burning with urination, frequency   INTEGUMENT:  negative for rash, skin lesions, easy bruising   HEMATOLOGIC/LYMPHATIC:  negative for swelling/edema   ALLERGIC/IMMUNOLOGIC:  negative for urticaria , itching  ENDOCRINE:  negative increase in drinking, increase in urination, hot or cold intolerance  MUSCULOSKELETAL:  negative joint pains, muscle aches, swelling of joints  NEUROLOGICAL:  negative for headaches, dizziness, lightheadedness, numbness, pain, tingling extremities      Physical Exam:     /66   Pulse 100   Temp 97.3 °F (36.3 °C)   Resp 14   Ht 6' (1.829 m)   Wt 106 lb (48.1 kg)   SpO2 98%   BMI 14.38 kg/m²   Temp (24hrs), Av.8 °F (36.6 °C), Min:97.3 °F (36.3 °C), Max:98.3 °F (36.8 °C)    No results for input(s): POCGLU in the last 72 hours. No intake or output data in the 24 hours ending 22 1633    General Appearance:  alert, well appearing, and in no acute distress  Mental status: oriented to person, place, and time with normal affect  Head:  normocephalic, atraumatic. Eye: no icterus, redness, pupils equal and reactive, extraocular eye movements intact, conjunctiva clear  Ear: normal external ear, no discharge, hearing intact  Nose:  no drainage noted  Mouth: mucous membranes moist  Neck: supple, no carotid bruits, thyroid not palpable  Lungs: Poor air entry bilaterally moderate wheezing  Cardiovascular: normal rate, regular rhythm, no murmur, gallop, rub.   Abdomen: Soft, nontender, nondistended, normal bowel sounds, no hepatomegaly or splenomegaly  Neurologic: There are no new focal motor or sensory deficits, normal muscle tone and bulk, no abnormal sensation, normal speech, cranial nerves II through XII grossly intact  Skin: No gross lesions, rashes, bruising or bleeding on exposed skin area  Extremities:  peripheral pulses palpable, no pedal edema or calf pain with palpation    Laboratory Testing:  Recent Results (from the past 24 hour(s))   CBC with Auto Differential    Collection Time: 22  6:24 AM   Result Value Ref Range    WBC 15.9 (H) 3.5 - 11.0 k/uL    RBC 3.95 (L) 4.5 - 5.9 m/uL    Hemoglobin 12.7 (L) 13.5 - 17.5 g/dL    Hematocrit 38.7 (L) 41 - 53 %    MCV 98.0 80 - 100 fL    MCH 32.2 26 - 34 pg    MCHC 32.9 31 - 37 g/dL    RDW 13.4 11.5 - 14.9 %    Platelets 116 375 - 825 k/uL    MPV 6.9 6.0 - 12.0 fL    Seg Neutrophils 33 (L) 36 - 66 %    Lymphocytes 55 (H) 24 - 44 %    Atypical Lymphocytes 2 %    Monocytes 7 1 - 7 %    Eosinophils % 3 0 - 4 %    Basophils 0 0 - 2 %    Segs Absolute 5.25 1.3 - 9.1 k/uL    Absolute Lymph # 8.74 (H) 1.0 - 4.8 k/uL    Atypical Lymphocytes Absolute 0.32 k/uL    Absolute Mono # 1.11 0.1 - 1.3 k/uL    Absolute Eos # 0.48 (H) 0.0 - 0.4 k/uL    Basophils Absolute 0.00 0.0 - 0.2 k/uL    Morphology ANISOCYTOSIS PRESENT     Morphology 1+ ELLIPTOCYTES    Comprehensive Metabolic Panel w/ Reflex to MG    Collection Time: 11/06/22  6:24 AM   Result Value Ref Range    Glucose 90 70 - 99 mg/dL    BUN 10 8 - 23 mg/dL    Creatinine 0.51 (L) 0.70 - 1.20 mg/dL    Est, Glom Filt Rate >60 >60 mL/min/1.73m2    Calcium 8.9 8.6 - 10.4 mg/dL    Sodium 139 135 - 144 mmol/L    Potassium 4.9 3.7 - 5.3 mmol/L    Chloride 100 98 - 107 mmol/L    CO2 30 20 - 31 mmol/L    Anion Gap 9 9 - 17 mmol/L    Alkaline Phosphatase 125 40 - 129 U/L    ALT 17 5 - 41 U/L    AST 21 <40 U/L    Total Bilirubin 0.2 (L) 0.3 - 1.2 mg/dL    Total Protein 6.4 6.4 - 8.3 g/dL    Albumin 3.5 3.5 - 5.2 g/dL       Imaging/Diagonstics:  XR CHEST (2 VW)    Result Date: 11/5/2022  Stable chest when compared to the prior exam 05/16/2022. No acute process     XR HIP RIGHT (2-3 VIEWS)    Result Date: 11/2/2022  No acute abnormality of the hip. CT HEAD WO CONTRAST    Result Date: 11/2/2022  No acute intracranial abnormality.        Assessment :      Hospital Problems             Last Modified POA    * (Principal) Acute psychosis ([facility] [address] 10/30/2022 Yes     Plan:     71-year-old gentleman with a severe protein calorie malnutrition likely respiratory cachexia  BMI is 14.38  Active smoker history of for COPD  Complains of cough medical consult rule out pneumonia  Patient has no fever examination has mild to moderate wheezing decreased air and  Will do a short burst of steroid no nebulizer as needed at this time  Chest x-ray two-view rule out pneumonia  Clinically have a low suspicion for pneumonia  Nov 6  Abnormal cxr  With lobar collapse  Will do ct lung  Rule out endobronchial lesion        Consultations:   IP CONSULT TO INTERNAL MEDICINE  IP CONSULT TO INTERNAL MEDICINE      Will Peralta MD  11/6/2022  4:33 PM    Copy sent to Dr. Zully Ambrocio, NP, APRN - CNP    Please note that this chart was generated using voice recognition Dragon dictation software. Although every effort was made to ensure the accuracy of this automated transcription, some errors in transcription may have occurred.

## 2025-01-02 NOTE — TELEPHONE ENCOUNTER
Handled in separate [employer] message.  Nothing further needs to be done.     Verena Rollins on 1/2/2025 at 2:27 PM

## 2025-05-28 ENCOUNTER — OFFICE VISIT (OUTPATIENT)
Dept: PODIATRY | Facility: CLINIC | Age: 80
End: 2025-05-28
Payer: COMMERCIAL

## 2025-05-28 DIAGNOSIS — M79.674 PAIN IN TOES OF BOTH FEET: ICD-10-CM

## 2025-05-28 DIAGNOSIS — L60.0 INGROWN TOENAIL OF LEFT FOOT: Primary | ICD-10-CM

## 2025-05-28 DIAGNOSIS — M79.675 PAIN IN TOES OF BOTH FEET: ICD-10-CM

## 2025-05-28 DIAGNOSIS — B35.1 ONYCHOMYCOSIS: ICD-10-CM

## 2025-05-28 DIAGNOSIS — L60.0 INGROWN TOENAIL OF RIGHT FOOT: ICD-10-CM

## 2025-05-28 PROCEDURE — 11750 EXCISION NAIL&NAIL MATRIX: CPT | Mod: T3 | Performed by: PODIATRIST

## 2025-05-28 NOTE — PROGRESS NOTES
PATIENT HISTORY:  Lidia Jenkins is a 79 year old female who presents to clinic for left great toenail on the end discoloration of sudden onset x 3 weeks. She notes a yellow/white color with thickening starting. No injury.  She is not treating with anything.  She is concerned at this point that I will continue to progress.  There are no signs of infection such as redness, drainage, edema, minimal to no pain with palpation.    She also notes pain and ingrown toenails to her 3rd and 4th toenails bilateral with fungus. She has had her 2nd toenails permanently removed 40 years ago and would like that done today. She will have pain to the sides of her toenails constantly. The pain is sharp, worse with pressure, walking. Pain alleviated with removal of shoes and sitting. No drainage or signs of infection. She is having a hard time trimming the toenails to remove the sides enough for much pain relief.     Review of Systems:   ROS: 10 point ROS neg other than the symptoms noted above in the HPI.     PAST MEDICAL HISTORY:   Past Medical History:   Diagnosis Date    Arthritis     Basal cell carcinoma     Cataract     Epiretinal membrane (ERM) of right eye     Hyperlipidaemia LDL goal < 100     Hyperlipidemia     Hypertension goal BP (blood pressure) < 140/90     Psoriasis         PAST SURGICAL HISTORY:   Past Surgical History:   Procedure Laterality Date    ABDOMEN SURGERY  1987    CATARACT IOL, RT/LT      COLONOSCOPY  2012    PHACOEMULSIFICATION WITH STANDARD INTRAOCULAR LENS IMPLANT Right 07/29/2024    Procedure: RIGHT EYE PHACOEMULSIFICATION, CATARACT, WITH STANDARD INTRAOCULAR LENS IMPLANT INSERTION;  Surgeon: Whitney Edouard MD;  Location: [facility] OR    PHACOEMULSIFICATION WITH STANDARD INTRAOCULAR LENS IMPLANT Left 12/09/2024    Procedure: LEFT EYE PHACOEMULSIFICATION, CATARACT, WITH STANDARD INTRAOCULAR LENS IMPLANT INSERTION;  Surgeon: Whitney Edouard MD;  Location: [facility] OR    RELEASE TRIGGER  FINGER Left 08/15/2019    Procedure: Left Middle Finger Trigger Release;  Surgeon: Castro Navarro MD;  [facility]    RETINAL REATTACHMENT      VITRECTOMY PARSPLANA WITH 25 GAUGE SYSTEM Right 10/07/2024    Procedure: RIGHT EYE VITRECTOMY, PARS PLANA APPROACH, USING 25-GAUGE INSTRUMENTS, MEMBRANE PEEL, AIR FLUID EXCHANGE;  Surgeon: Bry Jackman MD;  Location: [facility] OR        MEDICATIONS:   Current Outpatient Medications:     atenolol (TENORMIN) 25 MG tablet, Take 1 tablet (25 mg) by mouth daily., Disp: 90 tablet, Rfl: 3    calcium citrate-vitamin D (CITRACAL) 200-6.25 MG-MCG TABS per tablet, Take 1 tablet by mouth every evening., Disp: , Rfl:     cholecalciferol (VITAMIN D3) 25 mcg (1000 units) capsule, Take 1 capsule by mouth every evening., Disp: , Rfl:     glucosamine-chondroitin 500-400 MG CAPS per capsule, Take 1 capsule by mouth every evening, Disp: , Rfl:     ketoconazole (NIZORAL) 2 % external cream, Apply topically daily Use as needed for itchy areas on the face and ears (Patient taking differently: Apply topically as needed. Use as needed for itchy areas on the face and ears), Disp: 60 g, Rfl: 4    ketorolac tromethamine (ACULAR-LS) 0.4 % SOLN ophthalmic solution, Operated eye: Start same day as surgery 1 drop four times a day for 1 week, then twice a day for 1 week, then once a day for 1 week, then stop., Disp: 5 mL, Rfl: 1    lovastatin (MEVACOR) 40 MG tablet, Take 1 tablet (40 mg) by mouth at bedtime., Disp: 90 tablet, Rfl: 3    prednisoLONE acetate (PRED FORTE) 1 % ophthalmic suspension, Operated eye: Start the same day of surgery 1 drop four times a day for 1 week, then twice a day for 1 week, then once a day for 1 week, then stop, Disp: 5 mL, Rfl: 1    vitamin E (TOCOPHEROL) 400 units (180 mg) capsule, Take 400 Units by mouth every evening, Disp: , Rfl:      ALLERGIES:  No Known Allergies     SOCIAL HISTORY:   Social History     Socioeconomic History    Marital status: Single      Spouse name: Not on file    Number of children: Not on file    Years of education: Not on file    Highest education level: Not on file   Occupational History    Not on file   Tobacco Use    Smoking status: Never     Passive exposure: Never    Smokeless tobacco: Never   Vaping Use    Vaping status: Never Used   Substance and Sexual Activity    Alcohol use: Yes     Alcohol/week: 3.0 standard drinks of alcohol     Types: 3 Standard drinks or equivalent per week     Comment: 1-4 glasses of wine a week    Drug use: No    Sexual activity: Not Currently   Other Topics Concern    Parent/sibling w/ CABG, MI or angioplasty before 65F 55M? Yes     Comment: father, brother   Social History Narrative    Not on file     Social Drivers of Health     Financial Resource Strain: Low Risk  (12/12/2024)    Financial Resource Strain     Within the past 12 months, have you or your family members you live with been unable to get utilities (heat, electricity) when it was really needed?: No   Food Insecurity: Low Risk  (12/12/2024)    Food Insecurity     Within the past 12 months, did you worry that your food would run out before you got money to buy more?: No     Within the past 12 months, did the food you bought just not last and you didn t have money to get more?: No   Transportation Needs: Low Risk  (12/12/2024)    Transportation Needs     Within the past 12 months, has lack of transportation kept you from medical appointments, getting your medicines, non-medical meetings or appointments, work, or from getting things that you need?: No   Physical Activity: Sufficiently Active (12/12/2024)    Exercise Vital Sign     Days of Exercise per Week: 4 days     Minutes of Exercise per Session: 60 min   Stress: No Stress Concern Present (12/12/2024)    [employer] - Occupational Stress Questionnaire     Feeling of Stress : Not at all   Social Connections: Unknown (12/12/2024)    Social Connection and Isolation  Panel [NHANES]     Frequency of Communication with Friends and Family: Not on file     Frequency of Social Gatherings with Friends and Family: Twice a week     Attends [employer] Services: Not on file     Active Member of Clubs or Organizations: Not on file     Attends Club or Organization Meetings: Not on file     Marital Status: Not on file   Interpersonal Safety: Low Risk  (12/13/2024)    Interpersonal Safety     Do you feel physically and emotionally safe where you currently live?: Yes     Within the past 12 months, have you been hit, slapped, kicked or otherwise physically hurt by someone?: No     Within the past 12 months, have you been humiliated or emotionally abused in other ways by your partner or ex-partner?: No   Housing Stability: Low Risk  (12/12/2024)    Housing Stability     Do you have housing? : Yes     Are you worried about losing your housing?: No        FAMILY HISTORY:   Family History   Problem Relation Age of Onset    Hypertension Mother     Hyperlipidemia Mother     Depression Mother     Coronary Artery Disease Father         MI    Hypertension Father     Hyperlipidemia Father     Substance Abuse Father     Heart Failure Father     Hypertension Sister     Hyperlipidemia Sister     Hypertension Sister     Hyperlipidemia Sister     Depression Sister     Substance Abuse Sister     Hypertension Brother     Heart Disease Brother     Other Cancer Brother     Hyperlipidemia Brother     Coronary Artery Disease Brother     Hypertension Brother     Substance Abuse Brother     Hyperlipidemia Brother     Melanoma No family hx of     Skin Cancer No family hx of     Macular Degeneration No family hx of     Glaucoma No family hx of     Anesthesia Reaction No family hx of     Venous thrombosis No family hx of         EXAM:Vitals: There were no vitals taken for this visit.  BMI= There is no height or weight on file to calculate BMI.    General appearance: Patient is alert and fully cooperative with history &  exam.  No sign of distress is noted during the visit.     Psychiatric: Affect is pleasant & appropriate.  Patient appears motivated to improve health.     Respiratory: Breathing is regular & unlabored while sitting.     HEENT: Hearing is intact to spoken word.  Speech is clear.  No gross evidence of visual impairment that would impact ambulation.    Lower Extremity Focused:    Dermatologic: dry, thin, no pedal hair growth, no openings. Bilateral borders toes 3,4 incurvated bilateral feet, pain with palpation, no erythema, no edema, no drainage. 2nd toenail avulsed. Left great toenail distal discolored yellow, brittle, subungual debris, thickened.      Vascular: DP pulse 2/4 right 2/4 left PT pulse 2/4 right 2/4 left.  No edema, positive varicosities noted.  CFT and skin temperature is normal to both lower extremities.     Neurologic: Lower extremity sensation is intact to light touch.  No evidence of weakness or contracture in the lower extremities.  No evidence of neuropathy.     Musculoskeletal: slight plantarflexion of toes 2-5 bilateral.     Procedure:   After verbal consent, the left 3rd and 4th was anesthetized with 5cc's of 1% lidocaine plain. A tourniquet was applied to the toe. The entire toenail of the left 3rd and 4th toe was then raised from the nail bed with blunt instrumentation followed by an English anvil to then cut the length of the nail.  The offending nail border was then removed with a hemostat followed by curettage of the nailbed. Next 3 cotton tip applicators soaks in Phenol were applied to the nailbed x 30 seconds each.  Bacitracin was applied to the nail bed.  The tourniquet was removed.  Bandage was applied to the toe with gauze and coban.  The patient tolerated the procedure and anesthesia well.        Labs and Imaging: I have personally reviewed the following       ASSESSMENT:   Onychomycosis   Ingrown bilateral 3rd and 4th toenails painful  Ingrown left great toenail with fungus.       Medical Decision Making/Plan:  Reviewed patient's chart in [facility].  The potential causes and nature of an ingrown toenail were discussed with the patient.  We reviewed the natural history and prognosis of the condition and potential risks if no treatment is provided.  Treatment options discussed included conservative management (oral antibiotics, soaking of foot, adequate width shoes)  as well as surgical management (partial or total nail removal).  The pros and cons of both forms as well as risks and benefits of treatment were reviewed.    Today the patient would like to proceed with left 3rd and 4th toenails total nail avulsion with chemical matrixectomy   Postoperative instructions discussed including soaking in a warm antibacterial bath or epsom salts 10-20 minutes daily followed by a triple antibiotic ointment bandage.   Progress activity gradually per tolerance  Discussed causes and treatments of nail fungus.  Explained that even if a culture comes back negative, a patient could still have nail fungus.  Discussed treatment options with patient and explained that there isn't one treatment that is 100% effective.  Discussed oral lamisil which is the most effective at about 70% but which can have liver effects.  Explained that if she wanted to try this that she would need serial blood draws to test her liver function.  Discussed over the counter antifungal creams.  Explained that these are about 50% effective and need to be applied once a day for about 6-8months.  Also talked about prescription penlac which is a nail laquer.  Again this is also only 50% effective.  Also discussed that if there was damage to the nail and the nail is now dystrophic that non of the above is going to change the nail.  If there was damage, there is note anything that can be done for the nail to correct it.  Discussed that if it becomes painful, we can remove the nail in clinic.    We will monitor at this time  Follow-up as needed.      Patient risk factor: moderate for infection secondary to procedure.     All questions were answered to patients satisfaction and they will call with further questions or concerns.       Darby Ohara DPM

## 2025-05-28 NOTE — PATIENT INSTRUCTIONS
Thank you for choosing [facility]!    DR GOODWIN CLINIC:  [facility]   [address]   [place] [address]   (Mon, Tues)     [facility]  [address], [place] [address]  (Friday)    [facility]  [address]  [place] [address]  (Wednesdays)       TRIAGE LINE: [phone]  APPOINTMENTS: [phone]  RADIOLOGY: [phone]  SET UP SURGERY: [phone]  PHYSICAL THERAPY: [phone]   BILLING QUESTIONS: [phone]  FAX: [phone]       Follow up: As needed      - What is an ingrown toenail? An ingrown toenail is a medical condition usually caused by a nail edge irritating the neighboring soft tissue and skin. It can cause pain and lead to infection.  - What causes ingrown toenails? There are likely multiple causes of ingrown toenails, including nail shape and width, narrow shoes, a person s activity level, and likely family history of nail problems.  - Risks of not treating condition: If left untreated, some people endure ongoing tenderness and pain. The biggest concern is infection from bacteria entering through the damage soft tissue.  - How is it treated? Some people achieve relief by soaking their feet and trimming the edge of the nail. If an infection has developed, then an oral antibiotic can be prescribed, but the condition often returns after the course of the medication is completed. Often self treatment is not successful and treatment by a qualified medical provider is needed. This often involves numbing the toe with a local anesthetic and then either removing the edge of a nail or the entire nail.  - Risks of surgery? As with any form of surgery, infections is a risk. However, a person is probably at greater risk for infection if the ingrown toenail is left untreated. Nail removal is a common, simple, and fairly quick procedure that in most cases is done in the physician's  office. If an infection exists, often the only treatment that is successful is removal.     DR. GOODWIN'S RECOMMENDATIONS FOR HEALING AFTER A NAIL REMOVAL PROCEDURE    1. Try to keep the bandage on until bedtime or the morning after your procedure.     2. Some bleeding is normal. If bleeding seems excessive to you, place ice on top of your foot for 15-20 minutes, elevate your foot above heart level and reinforce the dressing (add additional covering)    3. Over the counter pain medication (tylenol / ibuprofen), elevating your foot and cold application is all you should need for pain control. Pain is usually easily managed.      4. For 1-2 weeks, soak your foot twice a day in mild, skin friendly soap water solution for 15 minutes (dish soap, hand soap, body wash, etc). After soaking, blot the area dry and apply a regular band aid. An antibiotic ointment is not needed.  If the guaze dressing sticks to your toe, soak your foot for a few minutes with the dressing on. This should help loosen it.     6. It is normal to experience some discomfort and redness around the nail for several days following the procedure. Drainage will likely appear a red and/or yellow. This is normal. If your toe is still draining fluid after 2 weeks, continue soaking.    7. Initial discomfort might last for 2-3 days. You may resume with regular activity as soon as you want,  as long as you keep the wound clean, dry and follow the soaking instruction. It is recommended that you do not enter public swimming pools/hot tubs while your toe is draining.    8. If you are experiencing worsening pain and redness or notice pus after 2-3 days please contact the clinic. Ask to speak with a triage nurse and they will inform our team of your symptoms and we can advise if a follow up is needed.      IF YOU HAD A PERMANENT NAIL REMOVAL PROCEDURE    - Healing will take longer and you might need to soak for 2-3 weeks. You are healing from the nail being removed  and the chemical burn.  - Expect some drainage, crust  and redness. This is from the acid (phenol) that was applied and the chemical burn.  - After soaking, use a Q-tip to clean under and around the skin where the nail was removed. This helps get rid of the brownish material and helps the wound drain  - Sometimes it is hard to know if the redness and drainage is normal versus a developing infection. If in doubt, reach out to Dr. Blanco's office via My Chart or phone.   - If redness extends back to the middle of the toe, you should have it looked at in clinic.     After 2-3 days, if you are experiencing worsening pain and redness, or notice pus, please contact the clinic. Ask to speak with a triage nurse and they will inform our team of your symptoms and we can advise if a follow up is needed.

## 2025-05-28 NOTE — LETTER
5/28/2025      Lidia Jenkins  [address] [place] [id number]      Dear Colleague,    Thank you for referring your patient, Lidia Jenkins, to the [facility]. Please see a copy of my visit note below.    PATIENT HISTORY:  Lidia Jenkins is a 79 year old female who presents to clinic for left great toenail on the end discoloration of sudden onset x 3 weeks. She notes a yellow/white color with thickening starting. No injury.  She is not treating with anything.  She is concerned at this point that I will continue to progress.  There are no signs of infection such as redness, drainage, edema, minimal to no pain with palpation.    She also notes pain and ingrown toenails to her 3rd and 4th toenails bilateral with fungus. She has had her 2nd toenails permanently removed 40 years ago and would like that done today. She will have pain to the sides of her toenails constantly. The pain is sharp, worse with pressure, walking. Pain alleviated with removal of shoes and sitting. No drainage or signs of infection. She is having a hard time trimming the toenails to remove the sides enough for much pain relief.     Review of Systems:   ROS: 10 point ROS neg other than the symptoms noted above in the HPI.     PAST MEDICAL HISTORY:   Past Medical History:   Diagnosis Date     Arthritis      Basal cell carcinoma      Cataract      Epiretinal membrane (ERM) of right eye      Hyperlipidaemia LDL goal < 100      Hyperlipidemia      Hypertension goal BP (blood pressure) < 140/90      Psoriasis         PAST SURGICAL HISTORY:   Past Surgical History:   Procedure Laterality Date     ABDOMEN SURGERY  1987     CATARACT IOL, RT/LT       COLONOSCOPY  2012     PHACOEMULSIFICATION WITH STANDARD INTRAOCULAR LENS IMPLANT Right 07/29/2024    Procedure: RIGHT EYE PHACOEMULSIFICATION, CATARACT, WITH STANDARD INTRAOCULAR LENS IMPLANT INSERTION;  Surgeon: Whitney Edouard MD;  Location: [facility] OR      PHACOEMULSIFICATION WITH STANDARD INTRAOCULAR LENS IMPLANT Left 12/09/2024    Procedure: LEFT EYE PHACOEMULSIFICATION, CATARACT, WITH STANDARD INTRAOCULAR LENS IMPLANT INSERTION;  Surgeon: Whitney Edouard MD;  Location: [facility] OR     RELEASE TRIGGER FINGER Left 08/15/2019    Procedure: Left Middle Finger Trigger Release;  Surgeon: Castro Navarro MD;  Location: [facility] OR     RETINAL REATTACHMENT       VITRECTOMY PARSPLANA WITH 25 GAUGE SYSTEM Right 10/07/2024    Procedure: RIGHT EYE VITRECTOMY, PARS PLANA APPROACH, USING 25-GAUGE INSTRUMENTS, MEMBRANE PEEL, AIR FLUID EXCHANGE;  Surgeon: Bry Jackman MD;  Location: [facility] OR        MEDICATIONS:   Current Outpatient Medications:      atenolol (TENORMIN) 25 MG tablet, Take 1 tablet (25 mg) by mouth daily., Disp: 90 tablet, Rfl: 3     calcium citrate-vitamin D (CITRACAL) 200-6.25 MG-MCG TABS per tablet, Take 1 tablet by mouth every evening., Disp: , Rfl:      cholecalciferol (VITAMIN D3) 25 mcg (1000 units) capsule, Take 1 capsule by mouth every evening., Disp: , Rfl:      glucosamine-chondroitin 500-400 MG CAPS per capsule, Take 1 capsule by mouth every evening, Disp: , Rfl:      ketoconazole (NIZORAL) 2 % external cream, Apply topically daily Use as needed for itchy areas on the face and ears (Patient taking differently: Apply topically as needed. Use as needed for itchy areas on the face and ears), Disp: 60 g, Rfl: 4     ketorolac tromethamine (ACULAR-LS) 0.4 % SOLN ophthalmic solution, Operated eye: Start same day as surgery 1 drop four times a day for 1 week, then twice a day for 1 week, then once a day for 1 week, then stop., Disp: 5 mL, Rfl: 1     lovastatin (MEVACOR) 40 MG tablet, Take 1 tablet (40 mg) by mouth at bedtime., Disp: 90 tablet, Rfl: 3     prednisoLONE acetate (PRED FORTE) 1 % ophthalmic suspension, Operated eye: Start the same day of surgery 1 drop four times a day for 1 week, then twice a day for 1 week, then once a day for  1 week, then stop, Disp: 5 mL, Rfl: 1     vitamin E (TOCOPHEROL) 400 units (180 mg) capsule, Take 400 Units by mouth every evening, Disp: , Rfl:      ALLERGIES:  No Known Allergies     SOCIAL HISTORY:   Social History     Socioeconomic History     Marital status: Single     Spouse name: Not on file     Number of children: Not on file     Years of education: Not on file     Highest education level: Not on file   Occupational History     Not on file   Tobacco Use     Smoking status: Never     Passive exposure: Never     Smokeless tobacco: Never   Vaping Use     Vaping status: Never Used   Substance and Sexual Activity     Alcohol use: Yes     Alcohol/week: 3.0 standard drinks of alcohol     Types: 3 Standard drinks or equivalent per week     Comment: 1-4 glasses of wine a week     Drug use: No     Sexual activity: Not Currently   Other Topics Concern     Parent/sibling w/ CABG, MI or angioplasty before 65F 55M? Yes     Comment: father, brother   Social History Narrative     Not on file     Social Drivers of Health     Financial Resource Strain: Low Risk  (12/12/2024)    Financial Resource Strain      Within the past 12 months, have you or your family members you live with been unable to get utilities (heat, electricity) when it was really needed?: No   Food Insecurity: Low Risk  (12/12/2024)    Food Insecurity      Within the past 12 months, did you worry that your food would run out before you got money to buy more?: No      Within the past 12 months, did the food you bought just not last and you didn t have money to get more?: No   Transportation Needs: Low Risk  (12/12/2024)    Transportation Needs      Within the past 12 months, has lack of transportation kept you from medical appointments, getting your medicines, non-medical meetings or appointments, work, or from getting things that you need?: No   Physical Activity: Sufficiently Active (12/12/2024)    Exercise Vital Sign      Days of Exercise per Week: 4 days       Minutes of Exercise per Session: 60 min   Stress: No Stress Concern Present (12/12/2024)    [employer] [place] [employer] - Occupational Stress Questionnaire      Feeling of Stress : Not at all   Social Connections: Unknown (12/12/2024)    Social Connection and Isolation Panel [NHANES]      Frequency of Communication with Friends and Family: Not on file      Frequency of Social Gatherings with Friends and Family: Twice a week      Attends Sikhism Services: Not on file      Active Member of Clubs or Organizations: Not on file      Attends Club or Organization Meetings: Not on file      Marital Status: Not on file   Interpersonal Safety: Low Risk  (12/13/2024)    Interpersonal Safety      Do you feel physically and emotionally safe where you currently live?: Yes      Within the past 12 months, have you been hit, slapped, kicked or otherwise physically hurt by someone?: No      Within the past 12 months, have you been humiliated or emotionally abused in other ways by your partner or ex-partner?: No   Housing Stability: Low Risk  (12/12/2024)    Housing Stability      Do you have housing? : Yes      Are you worried about losing your housing?: No        FAMILY HISTORY:   Family History   Problem Relation Age of Onset     Hypertension Mother      Hyperlipidemia Mother      Depression Mother      Coronary Artery Disease Father         MI     Hypertension Father      Hyperlipidemia Father      Substance Abuse Father      Heart Failure Father      Hypertension Sister      Hyperlipidemia Sister      Hypertension Sister      Hyperlipidemia Sister      Depression Sister      Substance Abuse Sister      Hypertension Brother      Heart Disease Brother      Other Cancer Brother      Hyperlipidemia Brother      Coronary Artery Disease Brother      Hypertension Brother      Substance Abuse Brother      Hyperlipidemia Brother      Melanoma No family hx of      Skin Cancer No family hx of      Macular Degeneration No  family hx of      Glaucoma No family hx of      Anesthesia Reaction No family hx of      Venous thrombosis No family hx of         EXAM:Vitals: There were no vitals taken for this visit.  BMI= There is no height or weight on file to calculate BMI.    General appearance: Patient is alert and fully cooperative with history & exam.  No sign of distress is noted during the visit.     Psychiatric: Affect is pleasant & appropriate.  Patient appears motivated to improve health.     Respiratory: Breathing is regular & unlabored while sitting.     HEENT: Hearing is intact to spoken word.  Speech is clear.  No gross evidence of visual impairment that would impact ambulation.    Lower Extremity Focused:    Dermatologic: dry, thin, no pedal hair growth, no openings. Bilateral borders toes 3,4 incurvated bilateral feet, pain with palpation, no erythema, no edema, no drainage. 2nd toenail avulsed. Left great toenail distal discolored yellow, brittle, subungual debris, thickened.      Vascular: DP pulse 2/4 right 2/4 left PT pulse 2/4 right 2/4 left.  No edema, positive varicosities noted.  CFT and skin temperature is normal to both lower extremities.     Neurologic: Lower extremity sensation is intact to light touch.  No evidence of weakness or contracture in the lower extremities.  No evidence of neuropathy.     Musculoskeletal: slight plantarflexion of toes 2-5 bilateral.     Procedure:   After verbal consent, the left 3rd and 4th was anesthetized with 5cc's of 1% lidocaine plain. A tourniquet was applied to the toe. The entire toenail of the left 3rd and 4th toe was then raised from the nail bed with blunt instrumentation followed by an English anvil to then cut the length of the nail.  The offending nail border was then removed with a hemostat followed by curettage of the nailbed. Next 3 cotton tip applicators soaks in Phenol were applied to the nailbed x 30 seconds each.  Bacitracin was applied to the nail bed.  The  tourniquet was removed.  Bandage was applied to the toe with gauze and coban.  The patient tolerated the procedure and anesthesia well.        Labs and Imaging: I have personally reviewed the following       ASSESSMENT:   Onychomycosis   Ingrown bilateral 3rd and 4th toenails painful  Ingrown left great toenail with fungus.      Medical Decision Making/Plan:  Reviewed patient[facility] in [facility].  The potential causes and nature of an ingrown toenail were discussed with the patient.  We reviewed the natural history and prognosis of the condition and potential risks if no treatment is provided.  Treatment options discussed included conservative management (oral antibiotics, soaking of foot, adequate width shoes)  as well as surgical management (partial or total nail removal).  The pros and cons of both forms as well as risks and benefits of treatment were reviewed.    Today the patient would like to proceed with left 3rd and 4th toenails total nail avulsion with chemical matrixectomy   Postoperative instructions discussed including soaking in a warm antibacterial bath or epsom salts 10-20 minutes daily followed by a triple antibiotic ointment bandage.   Progress activity gradually per tolerance  Discussed causes and treatments of nail fungus.  Explained that even if a culture comes back negative, a patient could still have nail fungus.  Discussed treatment options with patient and explained that there isn't one treatment that is 100% effective.  Discussed oral lamisil which is the most effective at about 70% but which can have liver effects.  Explained that if she wanted to try this that she would need serial blood draws to test her liver function.  Discussed over the counter antifungal creams.  Explained that these are about 50% effective and need to be applied once a day for about 6-8months.  Also talked about prescription penlac which is a nail laquer.  Again this is also only 50% effective.  Also discussed that if  there was damage to the nail and the nail is now dystrophic that non of the above is going to change the nail.  If there was damage, there is note anything that can be done for the nail to correct it.  Discussed that if it becomes painful, we can remove the nail in clinic.    We will monitor at this time  Follow-up as needed.     Patient risk factor: moderate for infection secondary to procedure.     All questions were answered to patients satisfaction and they will call with further questions or concerns.       Darby Ohara DPM      Again, thank you for allowing me to participate in the care of your patient.        Sincerely,        Darby Ohara DPM    Electronically signed

## 2025-06-10 ENCOUNTER — TRANSFERRED RECORDS (OUTPATIENT)
Dept: HEALTH INFORMATION MANAGEMENT | Facility: CLINIC | Age: 80
End: 2025-06-10
Payer: COMMERCIAL

## 2025-07-01 ENCOUNTER — ANCILLARY PROCEDURE (OUTPATIENT)
Dept: MAMMOGRAPHY | Facility: CLINIC | Age: 80
End: 2025-07-01
Attending: INTERNAL MEDICINE
Payer: COMMERCIAL

## 2025-07-01 DIAGNOSIS — Z12.31 VISIT FOR SCREENING MAMMOGRAM: ICD-10-CM

## 2025-07-01 PROCEDURE — 77063 BREAST TOMOSYNTHESIS BI: CPT | Mod: GC

## 2025-07-01 PROCEDURE — 77067 SCR MAMMO BI INCL CAD: CPT | Mod: GC

## (undated) DEVICE — SU ETHILON 4-0 PS-2 18" BLACK 1667H

## (undated) DEVICE — SOL NACL 0.9% IRRIG 500ML BOTTLE 2F7123

## (undated) DEVICE — EYE NDL RETROBULBAR ATKINSON 25GA 1.5" 581637

## (undated) DEVICE — Device

## (undated) DEVICE — DRAPE STERI TOWEL LG 1010

## (undated) DEVICE — LINEN TOWEL PACK X5 5464

## (undated) DEVICE — CATARACT BLADE PACK 2.5MM 58001898

## (undated) DEVICE — PREP CHLORAPREP 26ML TINTED ORANGE  260815

## (undated) DEVICE — EYE DRAPE MICROSCOPE UNIVERSAL (BIOM FULL) 08-MK55140

## (undated) DEVICE — GLOVE PROTEXIS BLUE W/NEU-THERA 6.5  2D73EB65

## (undated) DEVICE — PACK CATARACT CUSTOM ASC SEY15CPUMC

## (undated) DEVICE — EYE TIP IRRIGATION & ASPIRATION POLYMER 35D BENT 8065751511

## (undated) DEVICE — EYE CANN IRR 25GA HYDRODISSECTING 585037

## (undated) DEVICE — DRAPE EYE SHEET 8441

## (undated) DEVICE — GLOVE BIOGEL PI MICRO SZ 6.5 48565

## (undated) DEVICE — LINEN ORTHO PACK 5446

## (undated) DEVICE — TAPE MICROPORE 1"X1.5YD 1530S-1

## (undated) DEVICE — GLOVE BIOGEL PI MICRO SZ 7.5 48575

## (undated) DEVICE — PACK HAND CUSTOM ASC

## (undated) DEVICE — EYE PACK CUSTOM ANTERIOR 30DEG TIP CENTURION PPK6682-04

## (undated) DEVICE — BNDG KLING 2" 2231

## (undated) DEVICE — BRUSH SURGICAL SCRUB W/4% CHG SOL 25ML 371073

## (undated) DEVICE — EYE DRSG PAD OVAL

## (undated) DEVICE — PACK VITRECTOMY/RET CUSTOM ASC PQ15VRU12

## (undated) DEVICE — SU PLAIN 6-0 TG140-8 18" 1735G

## (undated) DEVICE — SOL WATER IRRIG 500ML BOTTLE 2F7113

## (undated) DEVICE — EYE SHIELD PLASTIC

## (undated) DEVICE — BNDG KLING 1" 2230

## (undated) DEVICE — SYR 05ML LL W/O NDL

## (undated) DEVICE — EYE PACK 25GA CONSTELLATION 10,000 CPM PPK9380-02

## (undated) DEVICE — DRSG GAUZE 2X2" 8042

## (undated) DEVICE — DRSG TEGADERM 4X4 3/4" 1626W

## (undated) DEVICE — GLOVE PROTEXIS POWDER FREE SMT 6.5  2D72PT65X

## (undated) DEVICE — LIGHT HANDLE X1 31140133

## (undated) DEVICE — TOURNIQUET SGL BLADDER 18"X4" RED 5921-218-135

## (undated) RX ORDER — ACETAMINOPHEN 325 MG/1
TABLET ORAL
Status: DISPENSED
Start: 2024-10-07

## (undated) RX ORDER — ACETAMINOPHEN 325 MG/1
TABLET ORAL
Status: DISPENSED
Start: 2024-07-29

## (undated) RX ORDER — PROPOFOL 10 MG/ML
INJECTION, EMULSION INTRAVENOUS
Status: DISPENSED
Start: 2024-10-07

## (undated) RX ORDER — ONDANSETRON 2 MG/ML
INJECTION INTRAMUSCULAR; INTRAVENOUS
Status: DISPENSED
Start: 2024-10-07

## (undated) RX ORDER — LIDOCAINE HYDROCHLORIDE AND EPINEPHRINE 10; 10 MG/ML; UG/ML
INJECTION, SOLUTION INFILTRATION; PERINEURAL
Status: DISPENSED
Start: 2019-08-15

## (undated) RX ORDER — FENTANYL CITRATE 50 UG/ML
INJECTION, SOLUTION INTRAMUSCULAR; INTRAVENOUS
Status: DISPENSED
Start: 2024-07-29